# Patient Record
Sex: FEMALE | Race: WHITE | Employment: FULL TIME | URBAN - NONMETROPOLITAN AREA
[De-identification: names, ages, dates, MRNs, and addresses within clinical notes are randomized per-mention and may not be internally consistent; named-entity substitution may affect disease eponyms.]

---

## 2017-01-03 PROBLEM — R03.0 BORDERLINE HYPERTENSION: Status: ACTIVE | Noted: 2017-01-03

## 2017-01-05 ENCOUNTER — TELEPHONE (OUTPATIENT)
Dept: OBGYN | Age: 52
End: 2017-01-05

## 2017-01-05 DIAGNOSIS — Z12.31 VISIT FOR SCREENING MAMMOGRAM: Primary | ICD-10-CM

## 2017-01-11 DIAGNOSIS — Z00.00 PHYSICAL EXAM: ICD-10-CM

## 2017-01-11 LAB
ALBUMIN SERPL-MCNC: 3.6 G/DL (ref 3.5–5.2)
ALP BLD-CCNC: 76 U/L (ref 35–104)
ALT SERPL-CCNC: 12 U/L (ref 5–33)
ANION GAP SERPL CALCULATED.3IONS-SCNC: 11 MMOL/L (ref 7–19)
AST SERPL-CCNC: 18 U/L (ref 5–32)
BILIRUB SERPL-MCNC: 0.4 MG/DL (ref 0.2–1.2)
BUN BLDV-MCNC: 9 MG/DL (ref 6–20)
CALCIUM SERPL-MCNC: 8.8 MG/DL (ref 8.6–10)
CHLORIDE BLD-SCNC: 105 MMOL/L (ref 98–111)
CHOLESTEROL, TOTAL: 141 MG/DL (ref 160–199)
CO2: 26 MMOL/L (ref 22–29)
CREAT SERPL-MCNC: 0.8 MG/DL (ref 0.5–0.9)
GFR NON-AFRICAN AMERICAN: >60
GLOBULIN: 2.9 G/DL
GLUCOSE BLD-MCNC: 103 MG/DL (ref 74–109)
HCT VFR BLD CALC: 40.8 % (ref 37–47)
HDLC SERPL-MCNC: 43 MG/DL (ref 65–121)
HEMOGLOBIN: 13.2 G/DL (ref 12–16)
LDL CHOLESTEROL CALCULATED: 80 MG/DL
MCH RBC QN AUTO: 29.6 PG (ref 27–31)
MCHC RBC AUTO-ENTMCNC: 32.4 G/DL (ref 33–37)
MCV RBC AUTO: 91.5 FL (ref 81–99)
PDW BLD-RTO: 13.4 % (ref 11.5–14.5)
PLATELET # BLD: 226 K/UL (ref 130–400)
PMV BLD AUTO: 10.3 FL (ref 7.4–10.4)
POTASSIUM SERPL-SCNC: 3.9 MMOL/L (ref 3.5–5)
RBC # BLD: 4.46 M/UL (ref 4.2–5.4)
SODIUM BLD-SCNC: 142 MMOL/L (ref 136–145)
TOTAL PROTEIN: 6.5 G/DL (ref 6.6–8.7)
TRIGL SERPL-MCNC: 89 MG/DL (ref 150–199)
WBC # BLD: 6.4 K/UL (ref 4.8–10.8)

## 2017-01-17 ENCOUNTER — TELEPHONE (OUTPATIENT)
Dept: PRIMARY CARE CLINIC | Age: 52
End: 2017-01-17

## 2017-01-17 RX ORDER — BENZONATATE 100 MG/1
100-200 CAPSULE ORAL 3 TIMES DAILY PRN
Qty: 90 CAPSULE | Refills: 0 | Status: SHIPPED | OUTPATIENT
Start: 2017-01-17 | End: 2017-01-24

## 2017-01-17 RX ORDER — DOXYCYCLINE 100 MG/1
100 CAPSULE ORAL 2 TIMES DAILY
Qty: 20 CAPSULE | Refills: 0 | Status: SHIPPED | OUTPATIENT
Start: 2017-01-17 | End: 2017-01-27

## 2017-03-16 ENCOUNTER — OFFICE VISIT (OUTPATIENT)
Dept: PRIMARY CARE CLINIC | Age: 52
End: 2017-03-16
Payer: COMMERCIAL

## 2017-03-16 VITALS
SYSTOLIC BLOOD PRESSURE: 120 MMHG | WEIGHT: 251.8 LBS | DIASTOLIC BLOOD PRESSURE: 76 MMHG | BODY MASS INDEX: 38.16 KG/M2 | TEMPERATURE: 98 F | HEIGHT: 68 IN | HEART RATE: 91 BPM | OXYGEN SATURATION: 98 %

## 2017-03-16 DIAGNOSIS — J01.00 ACUTE NON-RECURRENT MAXILLARY SINUSITIS: Primary | ICD-10-CM

## 2017-03-16 PROCEDURE — 99213 OFFICE O/P EST LOW 20 MIN: CPT | Performed by: FAMILY MEDICINE

## 2017-03-16 RX ORDER — DOXYCYCLINE 100 MG/1
100 TABLET ORAL 2 TIMES DAILY
Qty: 20 TABLET | Refills: 0 | Status: SHIPPED | OUTPATIENT
Start: 2017-03-16 | End: 2017-03-26

## 2017-03-16 RX ORDER — CALCIUM CARBONATE 500(1250)
500 TABLET ORAL DAILY
COMMUNITY

## 2017-03-16 RX ORDER — ASCORBIC ACID 500 MG
500 TABLET ORAL DAILY
COMMUNITY

## 2017-03-16 ASSESSMENT — ENCOUNTER SYMPTOMS
SORE THROAT: 1
SINUS PRESSURE: 1
COUGH: 1

## 2017-07-28 ENCOUNTER — OFFICE VISIT (OUTPATIENT)
Dept: PRIMARY CARE CLINIC | Age: 52
End: 2017-07-28
Payer: COMMERCIAL

## 2017-07-28 VITALS
WEIGHT: 256 LBS | SYSTOLIC BLOOD PRESSURE: 133 MMHG | HEIGHT: 68 IN | HEART RATE: 72 BPM | BODY MASS INDEX: 38.8 KG/M2 | RESPIRATION RATE: 18 BRPM | DIASTOLIC BLOOD PRESSURE: 84 MMHG

## 2017-07-28 DIAGNOSIS — G40.909 SEIZURE DISORDER (HCC): ICD-10-CM

## 2017-07-28 DIAGNOSIS — Z13.220 SCREENING CHOLESTEROL LEVEL: Primary | ICD-10-CM

## 2017-07-28 DIAGNOSIS — R03.0 BORDERLINE HYPERTENSION: ICD-10-CM

## 2017-07-28 DIAGNOSIS — Z13.9 SCREENING: ICD-10-CM

## 2017-07-28 PROCEDURE — 99213 OFFICE O/P EST LOW 20 MIN: CPT | Performed by: NURSE PRACTITIONER

## 2017-07-28 ASSESSMENT — ENCOUNTER SYMPTOMS
APNEA: 1
WHEEZING: 0
SHORTNESS OF BREATH: 0

## 2017-08-14 ENCOUNTER — TELEPHONE (OUTPATIENT)
Dept: PRIMARY CARE CLINIC | Age: 52
End: 2017-08-14

## 2017-08-14 ENCOUNTER — OFFICE VISIT (OUTPATIENT)
Dept: NEUROLOGY | Age: 52
End: 2017-08-14
Payer: COMMERCIAL

## 2017-08-14 VITALS
HEART RATE: 68 BPM | RESPIRATION RATE: 20 BRPM | DIASTOLIC BLOOD PRESSURE: 78 MMHG | WEIGHT: 255 LBS | SYSTOLIC BLOOD PRESSURE: 126 MMHG | HEIGHT: 68 IN | BODY MASS INDEX: 38.65 KG/M2

## 2017-08-14 DIAGNOSIS — G40.009 PARTIAL IDIOPATHIC EPILEPSY WITH SEIZURES OF LOCALIZED ONSET, NOT INTRACTABLE, WITHOUT STATUS EPILEPTICUS (HCC): Primary | ICD-10-CM

## 2017-08-14 DIAGNOSIS — G40.009 PARTIAL IDIOPATHIC EPILEPSY WITH SEIZURES OF LOCALIZED ONSET, NOT INTRACTABLE, WITHOUT STATUS EPILEPTICUS (HCC): ICD-10-CM

## 2017-08-14 LAB
ALBUMIN SERPL-MCNC: 3.6 G/DL (ref 3.5–5.2)
ALP BLD-CCNC: 87 U/L (ref 35–104)
ALT SERPL-CCNC: 14 U/L (ref 5–33)
ANION GAP SERPL CALCULATED.3IONS-SCNC: 15 MMOL/L (ref 7–19)
AST SERPL-CCNC: 17 U/L (ref 5–32)
BASOPHILS ABSOLUTE: 0.1 K/UL (ref 0–0.2)
BASOPHILS RELATIVE PERCENT: 0.8 % (ref 0–1)
BILIRUB SERPL-MCNC: <0.2 MG/DL (ref 0.2–1.2)
BUN BLDV-MCNC: 8 MG/DL (ref 6–20)
CALCIUM SERPL-MCNC: 9.4 MG/DL (ref 8.6–10)
CARBAMAZEPINE LEVEL: 2.2 UG/ML (ref 4–12)
CHLORIDE BLD-SCNC: 107 MMOL/L (ref 98–111)
CHOLESTEROL, TOTAL: 146 MG/DL (ref 160–199)
CO2: 24 MMOL/L (ref 22–29)
CREAT SERPL-MCNC: 0.7 MG/DL (ref 0.5–0.9)
EOSINOPHILS ABSOLUTE: 0 K/UL (ref 0–0.6)
EOSINOPHILS RELATIVE PERCENT: 0 % (ref 0–5)
GFR NON-AFRICAN AMERICAN: >60
GLUCOSE BLD-MCNC: 82 MG/DL (ref 74–109)
HCT VFR BLD CALC: 42.5 % (ref 37–47)
HDLC SERPL-MCNC: 40 MG/DL (ref 65–121)
HEMOGLOBIN: 14.1 G/DL (ref 12–16)
LDL CHOLESTEROL CALCULATED: 65 MG/DL
LYMPHOCYTES ABSOLUTE: 1.9 K/UL (ref 1.1–4.5)
LYMPHOCYTES RELATIVE PERCENT: 29.1 % (ref 20–40)
MCH RBC QN AUTO: 30 PG (ref 27–31)
MCHC RBC AUTO-ENTMCNC: 33.2 G/DL (ref 33–37)
MCV RBC AUTO: 90.4 FL (ref 81–99)
MONOCYTES ABSOLUTE: 0.8 K/UL (ref 0–0.9)
MONOCYTES RELATIVE PERCENT: 11.9 % (ref 0–10)
NEUTROPHILS ABSOLUTE: 3.8 K/UL (ref 1.5–7.5)
NEUTROPHILS RELATIVE PERCENT: 57.9 % (ref 50–65)
PDW BLD-RTO: 13 % (ref 11.5–14.5)
PLATELET # BLD: 254 K/UL (ref 130–400)
PMV BLD AUTO: 10.5 FL (ref 9.4–12.3)
POTASSIUM SERPL-SCNC: 4 MMOL/L (ref 3.5–5)
RBC # BLD: 4.7 M/UL (ref 4.2–5.4)
SODIUM BLD-SCNC: 146 MMOL/L (ref 136–145)
TOTAL PROTEIN: 7.3 G/DL (ref 6.6–8.7)
TRIGL SERPL-MCNC: 207 MG/DL (ref 150–199)
WBC # BLD: 6.6 K/UL (ref 4.8–10.8)

## 2017-08-14 PROCEDURE — 99213 OFFICE O/P EST LOW 20 MIN: CPT | Performed by: PSYCHIATRY & NEUROLOGY

## 2017-08-14 RX ORDER — CARBAMAZEPINE 200 MG/1
200 CAPSULE, EXTENDED RELEASE ORAL DAILY
Qty: 60 CAPSULE | Refills: 11 | Status: SHIPPED | OUTPATIENT
Start: 2017-08-14 | End: 2018-07-30 | Stop reason: SDUPTHER

## 2017-08-14 RX ORDER — CARBAMAZEPINE 200 MG/1
200 CAPSULE, EXTENDED RELEASE ORAL DAILY
COMMUNITY
End: 2017-08-14 | Stop reason: SDUPTHER

## 2017-08-16 ENCOUNTER — TELEPHONE (OUTPATIENT)
Dept: NEUROLOGY | Age: 52
End: 2017-08-16

## 2017-10-17 ENCOUNTER — HOSPITAL ENCOUNTER (OUTPATIENT)
Dept: WOMENS IMAGING | Age: 52
Discharge: HOME | End: 2017-10-17
Payer: COMMERCIAL

## 2017-10-17 ENCOUNTER — OFFICE VISIT (OUTPATIENT)
Dept: OBGYN | Age: 52
End: 2017-10-17
Payer: COMMERCIAL

## 2017-10-17 ENCOUNTER — HOSPITAL ENCOUNTER (OUTPATIENT)
Age: 52
Setting detail: SPECIMEN
Discharge: HOME | End: 2017-10-17
Payer: COMMERCIAL

## 2017-10-17 VITALS
HEIGHT: 68 IN | DIASTOLIC BLOOD PRESSURE: 88 MMHG | WEIGHT: 251 LBS | BODY MASS INDEX: 38.04 KG/M2 | SYSTOLIC BLOOD PRESSURE: 142 MMHG

## 2017-10-17 DIAGNOSIS — Z12.31 ENCOUNTER FOR SCREENING MAMMOGRAM FOR BREAST CANCER: ICD-10-CM

## 2017-10-17 DIAGNOSIS — Z12.31 VISIT FOR SCREENING MAMMOGRAM: Primary | ICD-10-CM

## 2017-10-17 DIAGNOSIS — Z12.4 CERVICAL CANCER SCREENING: ICD-10-CM

## 2017-10-17 DIAGNOSIS — Z12.31 VISIT FOR SCREENING MAMMOGRAM: ICD-10-CM

## 2017-10-17 DIAGNOSIS — Z01.419 ENCOUNTER FOR GYNECOLOGICAL EXAMINATION WITHOUT ABNORMAL FINDING: Primary | ICD-10-CM

## 2017-10-17 PROCEDURE — 99396 PREV VISIT EST AGE 40-64: CPT

## 2017-10-17 PROCEDURE — G0202 SCR MAMMO BI INCL CAD: HCPCS

## 2017-10-17 PROCEDURE — 87624 HPV HI-RISK TYP POOLED RSLT: CPT

## 2017-10-17 NOTE — PROGRESS NOTES
Pt presents today for pap smear and breast exam.  She also complains of irregular periods. Pt states she has 3 months w/o period, then had a period that lasted approx one month.      Last mammogram:  10-17-17  Last pap smear:  10-11-17  Contraception:  na  :  2  Para:  2  AB:  0  Last bone density:  None   Last colonoscopy: none

## 2017-10-17 NOTE — PATIENT INSTRUCTIONS
Well Visit, Women 48 to 72: Care Instructions  Your Care Instructions  Physical exams can help you stay healthy. Your doctor has checked your overall health and may have suggested ways to take good care of yourself. He or she also may have recommended tests. At home, you can help prevent illness with healthy eating, regular exercise, and other steps. Follow-up care is a key part of your treatment and safety. Be sure to make and go to all appointments, and call your doctor if you are having problems. It's also a good idea to know your test results and keep a list of the medicines you take. How can you care for yourself at home? · Reach and stay at a healthy weight. This will lower your risk for many problems, such as obesity, diabetes, heart disease, and high blood pressure. · Get at least 30 minutes of exercise on most days of the week. Walking is a good choice. You also may want to do other activities, such as running, swimming, cycling, or playing tennis or team sports. · Do not smoke. Smoking can make health problems worse. If you need help quitting, talk to your doctor about stop-smoking programs and medicines. These can increase your chances of quitting for good. · Protect your skin from too much sun. When you're outdoors from 10 a.m. to 4 p.m., stay in the shade or cover up with clothing and a hat with a wide brim. Wear sunglasses that block UV rays. Even when it's cloudy, put broad-spectrum sunscreen (SPF 30 or higher) on any exposed skin. · See a dentist one or two times a year for checkups and to have your teeth cleaned. · Wear a seat belt in the car. · Limit alcohol to 1 drink a day. Too much alcohol can cause health problems. Follow your doctor's advice about when to have certain tests. These tests can spot problems early. · Cholesterol.  Your doctor will tell you how often to have this done based on your age, family history, or other things that can increase your risk for heart attack and

## 2017-10-17 NOTE — PROGRESS NOTES
well-developed and well-nourished. HENT:   Head: Normocephalic and atraumatic. Neck: Normal range of motion. Neck supple. No thyromegaly present. Cardiovascular: Normal rate and regular rhythm. Pulmonary/Chest: Effort normal and breath sounds normal. She has no wheezes. She has no rhonchi. Right breast exhibits no inverted nipple, no mass, no nipple discharge, no skin change and no tenderness. Left breast exhibits no inverted nipple, no mass, no nipple discharge, no skin change and no tenderness. Breasts are symmetrical.   Abdominal: Soft. Bowel sounds are normal. There is no tenderness. Genitourinary: Uterus normal.       There is no tenderness or lesion on the right labia. There is no tenderness or lesion on the left labia. Uterus is not deviated, not enlarged and not fixed. Cervix exhibits no motion tenderness, no discharge and no friability. Right adnexum displays no mass, no tenderness and no fullness. Left adnexum displays no mass, no tenderness and no fullness. No erythema, tenderness or bleeding in the vagina. No vaginal discharge found. Genitourinary Comments: Specimen for cervical cytology obtained   Musculoskeletal: Normal range of motion. Neurological: She is alert and oriented to person, place, and time. Skin: Skin is warm and dry. Psychiatric: She has a normal mood and affect. Her behavior is normal.   Nursing note and vitals reviewed. Assessment:      1. Encounter for gynecological examination without abnormal finding  PAP SMEAR   2. Cervical cancer screening  PAP SMEAR   3. Encounter for screening mammogram for breast cancer             Plan:      MEDICATIONS:  No orders of the defined types were placed in this encounter. ORDERS:  Orders Placed This Encounter   Procedures    PAP SMEAR       The importance of self-breast examination was discussed. A screening mammogram is recommended at age 28 unless otherwise indicated.  At age 36, annual mammogram screenings are recommended. Pt had completed today. Birth control options were discussed. A well balanced diet and exercise were encouraged, as well as the addition of multivitamin. Pt denies hot flashes. Pt has blood work with PCP office and no refills today. Pt states skin tags don't bother her. If patient desires removal, it would be a scheduled procedure.

## 2017-10-23 LAB
HPV SOURCE: NORMAL
HPV, HIGH RISK: NEGATIVE

## 2018-02-26 ENCOUNTER — OFFICE VISIT (OUTPATIENT)
Dept: URGENT CARE | Age: 53
End: 2018-02-26
Payer: COMMERCIAL

## 2018-02-26 VITALS
OXYGEN SATURATION: 97 % | SYSTOLIC BLOOD PRESSURE: 135 MMHG | BODY MASS INDEX: 36.98 KG/M2 | HEIGHT: 68 IN | DIASTOLIC BLOOD PRESSURE: 81 MMHG | TEMPERATURE: 98.4 F | HEART RATE: 106 BPM | WEIGHT: 244 LBS | RESPIRATION RATE: 20 BRPM

## 2018-02-26 DIAGNOSIS — J10.1 INFLUENZA B: Primary | ICD-10-CM

## 2018-02-26 DIAGNOSIS — R50.9 FEVER, UNSPECIFIED FEVER CAUSE: ICD-10-CM

## 2018-02-26 LAB
INFLUENZA A ANTIBODY: NEGATIVE
INFLUENZA B ANTIBODY: POSITIVE
S PYO AG THROAT QL: NORMAL

## 2018-02-26 PROCEDURE — 99213 OFFICE O/P EST LOW 20 MIN: CPT | Performed by: NURSE PRACTITIONER

## 2018-02-26 PROCEDURE — 87804 INFLUENZA ASSAY W/OPTIC: CPT | Performed by: NURSE PRACTITIONER

## 2018-02-26 PROCEDURE — 87880 STREP A ASSAY W/OPTIC: CPT | Performed by: NURSE PRACTITIONER

## 2018-02-26 RX ORDER — DEXTROMETHORPHAN HYDROBROMIDE AND PROMETHAZINE HYDROCHLORIDE 15; 6.25 MG/5ML; MG/5ML
SYRUP ORAL
Qty: 120 ML | Refills: 0 | Status: SHIPPED | OUTPATIENT
Start: 2018-02-26 | End: 2018-03-05

## 2018-02-26 RX ORDER — BENZONATATE 100 MG/1
100 CAPSULE ORAL 3 TIMES DAILY PRN
Qty: 30 CAPSULE | Refills: 0 | Status: SHIPPED | OUTPATIENT
Start: 2018-02-26 | End: 2018-03-05

## 2018-02-26 ASSESSMENT — ENCOUNTER SYMPTOMS
NAUSEA: 1
COUGH: 1
DIARRHEA: 1
SORE THROAT: 1

## 2018-02-26 NOTE — PATIENT INSTRUCTIONS
Patient Education        Influenza (Flu): Care Instructions  Your Care Instructions    Influenza (flu) is an infection in the lungs and breathing passages. It is caused by the influenza virus. There are different strains, or types, of the flu virus from year to year. Unlike the common cold, the flu comes on suddenly and the symptoms, such as a cough, congestion, fever, chills, fatigue, aches, and pains, are more severe. These symptoms may last up to 10 days. Although the flu can make you feel very sick, it usually doesn't cause serious health problems. Home treatment is usually all you need for flu symptoms. But your doctor may prescribe antiviral medicine to prevent other health problems, such as pneumonia, from developing. Older people and those who have a long-term health condition, such as lung disease, are most at risk for having pneumonia or other health problems. Follow-up care is a key part of your treatment and safety. Be sure to make and go to all appointments, and call your doctor if you are having problems. It's also a good idea to know your test results and keep a list of the medicines you take. How can you care for yourself at home? · Get plenty of rest.  · Drink plenty of fluids, enough so that your urine is light yellow or clear like water. If you have kidney, heart, or liver disease and have to limit fluids, talk with your doctor before you increase the amount of fluids you drink. · Take an over-the-counter pain medicine if needed, such as acetaminophen (Tylenol), ibuprofen (Advil, Motrin), or naproxen (Aleve), to relieve fever, headache, and muscle aches. Read and follow all instructions on the label. No one younger than 20 should take aspirin. It has been linked to Reye syndrome, a serious illness. · Do not smoke. Smoking can make the flu worse. If you need help quitting, talk to your doctor about stop-smoking programs and medicines.  These can increase your chances of quitting for your doctor if:  ? · You begin to get better and then get worse. ? · You are not getting better after 1 week. Where can you learn more? Go to [email or web address] and sign in to your [employer] account. Enter N073 in the [facility] box to learn more about \"Influenza (Flu): Care Instructions. \"     If you do not have an account, please click on the \"Sign Up Now\" link. Current as of: May 12, 2017  Content Version: 11.5  © 3562-9354 Healthwise, Incorporated. Care instructions adapted under license by [facility] ([facility]). If you have questions about a medical condition or this instruction, always ask your healthcare professional. [employer] any warranty or liability for your use of this information. 1. Monitor fever and treat as needed  2. Take tessalon and promethazine as needed for coughing  3.  If patient is not improving or developing any new/worsening symptoms then RTC, prn

## 2018-02-26 NOTE — PROGRESS NOTES
[address] [facility]  [address] [facility] [address]  Dept: [phone]  Loc: [phone]    Ivanna Dacosta is a 46 y.o. female who presents today for her medical conditions/complaints as noted below. Iavnna Dacosta is c/o of Pharyngitis; Fever; Congestion; Headache; and Cough        HPI:     HPI     Patient presents to office with head congestion, coughing, sore throat and fever that started last week and got worse over the weekend. She reports fever up to 103. She states that she has had body aches and chills. She reports headache and facial pain on and off. She reports that her cough is dry and nonproductive. She denies abd pain, or vomiting. She reports some diarrhea yesterday that has resolved.      Past Medical History:   Diagnosis Date    Seizures ([address]       Past Surgical History:   Procedure Laterality Date     SECTION         Family History   Problem Relation Age of Onset    High Blood Pressure Mother     Heart Disease Mother     High Blood Pressure Father     Heart Disease Father     Colon Cancer Father     Diabetes Maternal Grandmother        Social History   Substance Use Topics    Smoking status: Former Smoker    Smokeless tobacco: Never Used    Alcohol use No      Current Outpatient Prescriptions   Medication Sig Dispense Refill    benzonatate (TESSALON PERLES) 100 MG capsule Take 1 capsule by mouth 3 times daily as needed for Cough 30 capsule 0    promethazine-dextromethorphan (PROMETHAZINE-DM) 6.25-15 MG/5ML syrup Take 5 ml every 8 hours as needed for coughing--may cause drowsiness 120 mL 0    carBAMazepine (CARBATROL) 200 MG extended release capsule Take 1 capsule by mouth daily Indications: 2 po qd 60 capsule 11    ascorbic acid (VITAMIN C) 500 MG tablet Take 500 mg by mouth daily      calcium carbonate (OSCAL) 500 MG TABS tablet Take 500 mg by mouth daily      therapeutic multivitamin-minerals [facility]) tablet Take 1 tablet by mouth daily.  conjugated estrogens (PREMARIN) vaginal cream 2-3 x a week Place vaginally daily. 1 Tube 11    carBAMazepine (CARBATROL) 100 MG CR capsule Take 2 capsules by mouth 2 times daily (Patient taking differently: Take 200 mg by mouth 2 times daily Two tablets by mouth BID) 360 capsule 3     No current facility-administered medications for this visit. No Known Allergies    Health Maintenance   Topic Date Due    Hepatitis C screen  1965    HIV screen  08/17/1980    DTaP/Tdap/Td vaccine (1 - Tdap) 08/17/1984    Shingles Vaccine (1 of 2 - 2 Dose Series) 08/17/2015    Colon cancer screen colonoscopy  08/17/2015    Breast cancer screen  10/17/2019    Lipid screen  08/14/2022    Cervical cancer screen  10/17/2022    Flu vaccine  Completed       Subjective:      Review of Systems   Constitutional: Positive for chills and fever. Body aches     HENT: Positive for congestion and sore throat. Respiratory: Positive for cough. Cardiovascular: Negative. Gastrointestinal: Positive for diarrhea and nausea. Musculoskeletal: Negative. Skin: Negative. Neurological: Positive for headaches. Objective:     Physical Exam   Constitutional: She is oriented to person, place, and time. Vital signs are normal. She appears well-developed and well-nourished. No distress. HENT:   Head: Normocephalic and atraumatic. Right Ear: Hearing, tympanic membrane, external ear and ear canal normal.   Left Ear: Hearing, tympanic membrane, external ear and ear canal normal.   Nose: Rhinorrhea present. Right sinus exhibits no maxillary sinus tenderness and no frontal sinus tenderness. Left sinus exhibits no maxillary sinus tenderness and no frontal sinus tenderness. Mouth/Throat: Uvula is midline and mucous membranes are normal. Posterior oropharyngeal erythema present. No oropharyngeal exudate. Eyes: Pupils are equal, round, and reactive to light. Neck: Neck supple. Cardiovascular: Normal rate, regular rhythm and normal heart sounds. No murmur heard. Pulmonary/Chest: Effort normal and breath sounds normal. No respiratory distress. She has no wheezes. She has no rales. Lymphadenopathy:     She has no cervical adenopathy. Neurological: She is alert and oriented to person, place, and time. Skin: Skin is warm. No rash noted. No erythema. Psychiatric: She has a normal mood and affect. Her speech is normal and behavior is normal. Judgment and thought content normal.   Nursing note and vitals reviewed. /81   Pulse 106   Temp 98.4 °F (36.9 °C) (Oral)   Resp 20   Ht 5' 8\" (1.727 m)   Wt 244 lb (110.7 kg)   SpO2 97%   BMI 37.10 kg/m²     Assessment:      1. Influenza B     2. Fever, unspecified fever cause  POCT Influenza A/B    POCT rapid strep A       Plan:      Flu was positive for flu B. Discussed diagnosis and treatment with patient. She is to monitor for fever and treat as needed with Tylenol/Ibuprofen. I am sending her tessalon and promethazine to take as needed for coughing. Advised symptomatic treatment. If patient is not improving or developing any new/worsening symptoms then RTC, prn or go to ER. Patient verbalizes understanding. \No Follow-up on file.     Orders Placed This Encounter   Procedures    POCT Influenza A/B    POCT rapid strep A       Results for orders placed or performed in visit on 02/26/18   POCT Influenza A/B   Result Value Ref Range    Influenza A Ab NEGATIVE     Influenza B Ab POSITIVE (A)    POCT rapid strep A   Result Value Ref Range    Strep A Ag None Detected None Detected       Orders Placed This Encounter   Medications    benzonatate (TESSALON PERLES) 100 MG capsule     Sig: Take 1 capsule by mouth 3 times daily as needed for Cough     Dispense:  30 capsule     Refill:  0    promethazine-dextromethorphan (PROMETHAZINE-DM) 6.25-15 MG/5ML syrup     Sig: Take 5 ml every 8 hours as needed for (Tylenol), ibuprofen (Advil, Motrin), or naproxen (Aleve), to relieve fever, headache, and muscle aches. Read and follow all instructions on the label. No one younger than 20 should take aspirin. It has been linked to Reye syndrome, a serious illness. · Do not smoke. Smoking can make the flu worse. If you need help quitting, talk to your doctor about stop-smoking programs and medicines. These can increase your chances of quitting for good. · Breathe moist air from a hot shower or from a sink filled with hot water to help clear a stuffy nose. · Before you use cough and cold medicines, check the label. These medicines may not be safe for young children or for people with certain health problems. · If the skin around your nose and lips becomes sore, put some petroleum jelly on the area. · To ease coughing:  ¨ Drink fluids to soothe a scratchy throat. ¨ Suck on cough drops or plain hard candy. ¨ Take an over-the-counter cough medicine that contains dextromethorphan to help you get some sleep. Read and follow all instructions on the label. ¨ Raise your head at night with an extra pillow. This may help you rest if coughing keeps you awake. · Take any prescribed medicine exactly as directed. Call your doctor if you think you are having a problem with your medicine. To avoid spreading the flu  · Wash your hands regularly, and keep your hands away from your face. · Stay home from school, work, and other public places until you are feeling better and your fever has been gone for at least 24 hours. The fever needs to have gone away on its own without the help of medicine. · Ask people living with you to talk to their doctors about preventing the flu. They may get antiviral medicine to keep from getting the flu from you. · To prevent the flu in the future, get a flu vaccine every fall. Encourage people living with you to get the vaccine. · Cover your mouth when you cough or sneeze.   When should you call for

## 2018-06-27 ENCOUNTER — OFFICE VISIT (OUTPATIENT)
Dept: URGENT CARE | Age: 53
End: 2018-06-27
Payer: COMMERCIAL

## 2018-06-27 ENCOUNTER — TELEPHONE (OUTPATIENT)
Dept: PRIMARY CARE CLINIC | Age: 53
End: 2018-06-27

## 2018-06-27 VITALS
HEIGHT: 68 IN | OXYGEN SATURATION: 94 % | HEART RATE: 67 BPM | WEIGHT: 245 LBS | DIASTOLIC BLOOD PRESSURE: 76 MMHG | SYSTOLIC BLOOD PRESSURE: 138 MMHG | RESPIRATION RATE: 20 BRPM | BODY MASS INDEX: 37.13 KG/M2 | TEMPERATURE: 97.2 F

## 2018-06-27 DIAGNOSIS — H66.92 LEFT ACUTE OTITIS MEDIA: Primary | ICD-10-CM

## 2018-06-27 PROCEDURE — 99213 OFFICE O/P EST LOW 20 MIN: CPT | Performed by: NURSE PRACTITIONER

## 2018-06-27 RX ORDER — AMOXICILLIN AND CLAVULANATE POTASSIUM 875; 125 MG/1; MG/1
1 TABLET, FILM COATED ORAL EVERY 12 HOURS
Qty: 20 TABLET | Refills: 0 | Status: SHIPPED | OUTPATIENT
Start: 2018-06-27 | End: 2018-07-07

## 2018-07-05 ENCOUNTER — TELEPHONE (OUTPATIENT)
Dept: NEUROLOGY | Age: 53
End: 2018-07-05

## 2018-07-05 RX ORDER — FLUCONAZOLE 150 MG/1
TABLET ORAL
Qty: 2 TABLET | Refills: 0 | Status: SHIPPED | OUTPATIENT
Start: 2018-07-05 | End: 2018-07-30 | Stop reason: ALTCHOICE

## 2018-07-05 NOTE — TELEPHONE ENCOUNTER
Pt called states been on antibiotics now having yeast infection. Also needs refill on Premarin to [facility].

## 2018-07-30 ENCOUNTER — OFFICE VISIT (OUTPATIENT)
Dept: PRIMARY CARE CLINIC | Age: 53
End: 2018-07-30
Payer: COMMERCIAL

## 2018-07-30 VITALS
BODY MASS INDEX: 37.74 KG/M2 | SYSTOLIC BLOOD PRESSURE: 120 MMHG | WEIGHT: 249 LBS | DIASTOLIC BLOOD PRESSURE: 76 MMHG | HEIGHT: 68 IN | OXYGEN SATURATION: 97 % | TEMPERATURE: 97.7 F | HEART RATE: 74 BPM

## 2018-07-30 DIAGNOSIS — Z11.4 ENCOUNTER FOR SCREENING FOR HIV: ICD-10-CM

## 2018-07-30 DIAGNOSIS — Z11.59 NEED FOR HEPATITIS C SCREENING TEST: ICD-10-CM

## 2018-07-30 DIAGNOSIS — N95.1 MENOPAUSAL SYNDROME (HOT FLASHES): Chronic | ICD-10-CM

## 2018-07-30 DIAGNOSIS — R63.5 WEIGHT GAIN: Chronic | ICD-10-CM

## 2018-07-30 DIAGNOSIS — Z12.11 COLON CANCER SCREENING: ICD-10-CM

## 2018-07-30 DIAGNOSIS — Z13.220 SCREENING FOR CHOLESTEROL LEVEL: ICD-10-CM

## 2018-07-30 DIAGNOSIS — Z00.00 WELL ADULT EXAM: ICD-10-CM

## 2018-07-30 PROCEDURE — 99396 PREV VISIT EST AGE 40-64: CPT | Performed by: NURSE PRACTITIONER

## 2018-07-30 ASSESSMENT — ENCOUNTER SYMPTOMS
ABDOMINAL DISTENTION: 0
DIARRHEA: 0
NAUSEA: 0
APNEA: 0
BACK PAIN: 0
COUGH: 0
WHEEZING: 0
VOMITING: 0
COLOR CHANGE: 0
ABDOMINAL PAIN: 0
CONSTIPATION: 0
CHEST TIGHTNESS: 0
SHORTNESS OF BREATH: 0

## 2018-07-30 ASSESSMENT — PATIENT HEALTH QUESTIONNAIRE - PHQ9
1. LITTLE INTEREST OR PLEASURE IN DOING THINGS: 0
SUM OF ALL RESPONSES TO PHQ QUESTIONS 1-9: 0
2. FEELING DOWN, DEPRESSED OR HOPELESS: 0
SUM OF ALL RESPONSES TO PHQ9 QUESTIONS 1 & 2: 0

## 2018-08-28 ENCOUNTER — OFFICE VISIT (OUTPATIENT)
Dept: NEUROLOGY | Age: 53
End: 2018-08-28
Payer: COMMERCIAL

## 2018-08-28 ENCOUNTER — TELEPHONE (OUTPATIENT)
Dept: NEUROLOGY | Age: 53
End: 2018-08-28

## 2018-08-28 VITALS
BODY MASS INDEX: 38.34 KG/M2 | DIASTOLIC BLOOD PRESSURE: 82 MMHG | WEIGHT: 253 LBS | HEIGHT: 68 IN | SYSTOLIC BLOOD PRESSURE: 145 MMHG | HEART RATE: 82 BPM

## 2018-08-28 DIAGNOSIS — Z13.220 SCREENING FOR CHOLESTEROL LEVEL: ICD-10-CM

## 2018-08-28 DIAGNOSIS — Z11.4 ENCOUNTER FOR SCREENING FOR HIV: ICD-10-CM

## 2018-08-28 DIAGNOSIS — G40.009 PARTIAL IDIOPATHIC EPILEPSY WITH SEIZURES OF LOCALIZED ONSET, NOT INTRACTABLE, WITHOUT STATUS EPILEPTICUS (HCC): ICD-10-CM

## 2018-08-28 DIAGNOSIS — G40.009 PARTIAL IDIOPATHIC EPILEPSY WITH SEIZURES OF LOCALIZED ONSET, NOT INTRACTABLE, WITHOUT STATUS EPILEPTICUS (HCC): Primary | ICD-10-CM

## 2018-08-28 DIAGNOSIS — R63.5 WEIGHT GAIN: Chronic | ICD-10-CM

## 2018-08-28 DIAGNOSIS — Z11.59 NEED FOR HEPATITIS C SCREENING TEST: ICD-10-CM

## 2018-08-28 LAB
CARBAMAZEPINE LEVEL: 3.4 UG/ML (ref 4–12)
CHOLESTEROL, TOTAL: 152 MG/DL (ref 160–199)
HDLC SERPL-MCNC: 54 MG/DL (ref 65–121)
LDL CHOLESTEROL CALCULATED: 82 MG/DL
T4 TOTAL: 7.2 UG/DL (ref 4.5–11.7)
TRIGL SERPL-MCNC: 82 MG/DL (ref 0–149)
TSH SERPL DL<=0.05 MIU/L-ACNC: 4.23 UIU/ML (ref 0.27–4.2)
VITAMIN B-12: 575 PG/ML (ref 211–946)

## 2018-08-28 PROCEDURE — 99213 OFFICE O/P EST LOW 20 MIN: CPT | Performed by: PSYCHIATRY & NEUROLOGY

## 2018-08-28 RX ORDER — CARBAMAZEPINE 100 MG/1
200 CAPSULE, EXTENDED RELEASE ORAL 2 TIMES DAILY
Qty: 360 CAPSULE | Refills: 3 | Status: SHIPPED | OUTPATIENT
Start: 2018-08-28 | End: 2019-09-19 | Stop reason: SDUPTHER

## 2018-08-28 NOTE — TELEPHONE ENCOUNTER
----- Message from Mana Stovall MD sent at 8/28/2018  4:31 PM CDT -----  Tegretol level is a little low but OK.   Continue same dose

## 2018-08-29 LAB — HEPATITIS C ANTIBODY INTERPRETATION: NORMAL

## 2018-08-31 ENCOUNTER — TELEPHONE (OUTPATIENT)
Dept: NEUROLOGY | Age: 53
End: 2018-08-31

## 2018-08-31 LAB
VITAMIN D2 AND D3, TOTAL: 30.3 NG/ML (ref 30–80)
VITAMIN D2, 25 HYDROXY: <1 NG/ML
VITAMIN D3,25 HYDROXY: 30.3 NG/ML

## 2018-08-31 NOTE — TELEPHONE ENCOUNTER
----- Message from Abby Worthington MD sent at 8/28/2018  4:31 PM CDT -----  Tegretol level is a little low but OK.   Continue same dose

## 2018-09-03 LAB — HIV-1 WESTERN BLOT: ABNORMAL

## 2018-09-05 ENCOUNTER — TELEPHONE (OUTPATIENT)
Dept: PRIMARY CARE CLINIC | Age: 53
End: 2018-09-05

## 2018-09-05 NOTE — TELEPHONE ENCOUNTER
Specimen Collected: 08/28/18 09:33 Last Resulted: 09/03/18 14:55                       Vitamin D 25 Hydrox, D2 & D3   Order: [id number]   Status:  Final result   Visible to patient:  Yes ([facility]) Dx:  Weight gain   Notes recorded by EDELMIRA Peters on 9/4/2018 at 8:05 AM CDT  Please notify patient of normal results. Ref Range & Units 8d ago   Vitamin D2 And D3, Total 30.0 - 80.0 ng/mL 30.3    Comment: INTERPRETIVE INFORMATION: 25-HydroxyVitamin D2 and D3, Serum   1-17 years:   Deficiency: less than 20 ng/mL   Optimum level: greater than or equal to 20 ng/mL*   *(Parker CL et al. Pediatrics 2008; 122: 1142-52.)   18 years and older:   Deficiency:  Less than 20 ng/mL   Insufficiency:  20-29 ng/mL   Optimum Level:  30-80 ng/mL   Possible Toxicity:  Greater than 765 ng/mL   (Garret MF et al. y Bun 2555; 81:3423-61)   Separate values for Vitamin D2 and D3 are reported in addition to the   total.   Access complete set of age- and/or gender-specific reference intervals   for   this test in the [address] [place] [address] Laboratory Test Directory ([employer]). Test developed and characteristics determined by [facility]. See   Compliance Statement B: [employer]   U.S. Patent No. [id number]   Performed by [facility],   [place] [address], [address] [facility] [phone]   www. Sallee Riedel, MD - Lab. Director    Vitamin D3,25 Hydroxy ng/mL 30.3    Vitamin D2, 25 Hydroxy ng/mL <1.0    Resulting Agency  [facility]      Specimen Collected: 08/28/18 09:33 Last Resulted: 08/31/18 06:38                       Hepatitis C Antibody   Order: [id number]   Status:  Final result   Visible to patient:  Yes ([facility]) Dx:  Need for hepatitis C screening test   Notes recorded by EDELMIRA Peters on 9/4/2018 at 8:05 AM CDT  Please notify patient of normal results. ------    Notes recorded by EDELMIRA Peters on 8/29/2018 at 11:48 AM CDT  Please notify patient of normal results.    Component 8d ago   Hep C Ab Interp Non-Reactive Resulting Agency [address] [facility]      Specimen Collected: 08/28/18 09:33 Last Resulted: 08/29/18 10:28                       Vitamin B12   Order: [id number]   Status:  Final result   Visible to patient:  Yes ([facility]) Dx:  Weight gain   Notes recorded by EDELMIRA Calhoun on 9/4/2018 at 8:05 AM CDT  Please notify patient of normal results. ------    Notes recorded by EDELMIRA Calhoun on 8/29/2018 at 11:48 AM CDT  Please notify patient of normal results. ------    Notes recorded by EDELMIRA Calhoun on 8/29/2018 at 8:13 AM CDT  Please notify patient of normal results. TSH is slightly elevated, but T4 is normal so definitely not hypothyroidism. Cholesterol is stable she does need to focus on increasing her HDL. Diet and exercise are the most beneficial way to achieve this. Otherwise the results of seizures so far looks very good. Ref Range & Units 8d ago   Vitamin B-12 211 - 946 pg/mL [facility]      Specimen Collected: 08/28/18 09:33 Last Resulted: 08/28/18 10:21                       TSH without Reflex   Order: [id number]   Status:  Final result   Visible to patient:  Yes ([facility]) Dx:  Weight gain   Notes recorded by EDELMIRA Calhoun on 9/4/2018 at 8:05 AM CDT  Please notify patient of normal results. ------    Notes recorded by EDELMIRA Calhoun on 8/29/2018 at 11:48 AM CDT  Please notify patient of normal results. ------    Notes recorded by EDELMIRA Calhoun on 8/29/2018 at 8:13 AM CDT  Please notify patient of normal results. TSH is slightly elevated, but T4 is normal so definitely not hypothyroidism. Cholesterol is stable she does need to focus on increasing her HDL. Diet and exercise are the most beneficial way to achieve this. Otherwise the results of seizures so far looks very good.     Ref Range & Units 8d ago   TSH 0.270 - 4.200 uIU/mL 4.230     Resulting Agency  [address] [facility]      Specimen Collected: 08/28/18 09:33 Last Resulted: 08/28/18 10:19                       T4   Order: [id number]   Status:  Final result   Visible to patient:  Yes ([employer]) Dx:  Weight gain   Notes recorded by EDELMIRA Thibodeaux on 9/4/2018 at 8:05 AM CDT  Please notify patient of normal results. ------    Notes recorded by EDELMIRA Thibodeaux on 8/29/2018 at 11:48 AM CDT  Please notify patient of normal results. ------    Notes recorded by EDELMIRA Thibodeaux on 8/29/2018 at 8:13 AM CDT  Please notify patient of normal results. TSH is slightly elevated, but T4 is normal so definitely not hypothyroidism. Cholesterol is stable she does need to focus on increasing her HDL. Diet and exercise are the most beneficial way to achieve this. Otherwise the results of seizures so far looks very good. Ref Range & Units 8d ago   T4, Total 4.5 - 11.7 ug/dL 7.2    Resulting Agency  [address] [facility]      Specimen Collected: 08/28/18 09:33 Last Resulted: 08/28/18 10:19                       Lipid Panel   Order: [id number]   Status:  Final result   Visible to patient:  Yes ([address]) Dx:  Screening for cholesterol level   Notes recorded by EDELMIRA Thibodeaux on 9/4/2018 at 8:05 AM CDT  Please notify patient of normal results. ------    Notes recorded by EDELMIRA Thibodeaux on 8/29/2018 at 11:48 AM CDT  Please notify patient of normal results. ------    Notes recorded by EDELMIRA Thibodeaux on 8/29/2018 at 8:13 AM CDT  Please notify patient of normal results. TSH is slightly elevated, but T4 is normal so definitely not hypothyroidism. Cholesterol is stable she does need to focus on increasing her HDL. Diet and exercise are the most beneficial way to achieve this. Otherwise the results of seizures so far looks very good.      Ref Range & Units 8d ago  (8/28/18) 1yr ago  (8/14/17) 1yr ago  (1/11/17)    Cholesterol, Total 160 - 199 mg/dL 152   146CM   141CM     Comment: <160 MG/DL=OPTIMAL     160-199 MG/DL= DESIRABLE     200-239 MG/DL=BORDERLINE-INCREASED RISK OF ATHEROSCLEROTIC   CARDIOVASCULAR DISEASE     > OR = 240 MG/DL-ASSOCIATED WITH AN INCREASED RISK OF   ATHROSCLEROTIC CARDIOVASCULAR DISEASE     Triglycerides 0 - 149 mg/dL 82  207R   89R      HDL 65 - 121 mg/dL 54   40CM   43CM     Comment: VALUES>60 MG/DL ARE ASSOCIATED WITH A DECREASED RISK OF   ATHEROSCLEROTIC CARDIOVASCULAR DISEASE     LDL Calculated <100 mg/dL 82  <100 mg/dL\" class=\"[id number]\">  65CM  <100 mg/dL\" class=\"[id number]\">  80CM    Comment: <100 MG/DL=OPITIMAL     100-129 MG/DL=DESIRABLE     130-159 MG/DL BORDERLINE=INCREASED RISK OF ATHEROSCLEROTIC   CARDIOVASCULAR DISEASE     > OR = 160 MG/DL=ASSOCIATED WITH AN INCREASE RISK OF   ATHEROSCLEROTIC CARDIOVASCULAR DISEASE    Resulting Agency  [address] [place] [address] Lab      Specimen Collected: 08/28/18 09:33 Last Resulted: 08/28/18 10:10              CM=Additional comments  R=Reference range differs from displayed range              Status of Other Orders     Order  Lab Status Result Date Provider Status   Comprehensive Metabolic Panel No Result  Ordered     Please notify patient of normal results. Associated Results     Hepatitis C Antibody   Order: [id number]   Status:  Final result   Visible to patient:  Yes ([employer]) Dx:  Need for hepatitis C screening test   Notes recorded by Brock Opitz, APRN on 9/4/2018 at 8:05 AM CDT  Please notify patient of normal results. ------    Notes recorded by Brock Opitz, APRN on 8/29/2018 at 11:48 AM CDT  Please notify patient of normal results.    Component 8d ago   [address] [facility] [address] [facility]      Specimen Collected: 08/28/18 09:33 Last Resulted: 08/29/18 10:28                       Vitamin B12   Order: [id number]   Status:  Final result   Visible to patient:  Yes ([employer]) Dx:  Weight gain   Notes recorded by Brock Opitz, APRN on 9/4/2018 at 8:05 AM CDT  Please notify patient of normal

## 2018-09-05 NOTE — TELEPHONE ENCOUNTER
----- Message from EDELMIRA Mayer sent at 9/4/2018  8:05 AM CDT -----  Please notify patient of normal results.

## 2018-09-14 ENCOUNTER — TELEPHONE (OUTPATIENT)
Dept: GASTROENTEROLOGY | Age: 53
End: 2018-09-14

## 2018-09-14 NOTE — TELEPHONE ENCOUNTER
I called this NP to discuss open access colon screen, she did not answer, I left her a VM requesting a return call.  Ryan radford

## 2018-09-21 ENCOUNTER — TELEPHONE (OUTPATIENT)
Dept: GASTROENTEROLOGY | Age: 53
End: 2018-09-21

## 2018-10-23 ENCOUNTER — HOSPITAL ENCOUNTER (OUTPATIENT)
Dept: WOMENS IMAGING | Age: 53
Discharge: HOME | End: 2018-10-23
Payer: COMMERCIAL

## 2018-10-23 ENCOUNTER — OFFICE VISIT (OUTPATIENT)
Dept: OBGYN | Age: 53
End: 2018-10-23
Payer: COMMERCIAL

## 2018-10-23 VITALS
BODY MASS INDEX: 38.19 KG/M2 | DIASTOLIC BLOOD PRESSURE: 80 MMHG | WEIGHT: 252 LBS | HEART RATE: 80 BPM | SYSTOLIC BLOOD PRESSURE: 134 MMHG | HEIGHT: 68 IN

## 2018-10-23 DIAGNOSIS — Z12.39 SCREENING FOR BREAST CANCER: ICD-10-CM

## 2018-10-23 DIAGNOSIS — Z01.419 ENCOUNTER FOR WELL WOMAN EXAM WITH ROUTINE GYNECOLOGICAL EXAM: Primary | ICD-10-CM

## 2018-10-23 DIAGNOSIS — Z78.0 MENOPAUSE: ICD-10-CM

## 2018-10-23 DIAGNOSIS — Z12.4 SCREENING FOR CERVICAL CANCER: ICD-10-CM

## 2018-10-23 DIAGNOSIS — N95.1 SYMPTOMATIC MENOPAUSAL OR FEMALE CLIMACTERIC STATES: ICD-10-CM

## 2018-10-23 PROCEDURE — 77063 BREAST TOMOSYNTHESIS BI: CPT

## 2018-10-23 PROCEDURE — 99396 PREV VISIT EST AGE 40-64: CPT | Performed by: NURSE PRACTITIONER

## 2018-10-23 RX ORDER — ESTRADIOL AND NORETHINDRONE ACETATE 1; .5 MG/1; MG/1
1 TABLET ORAL DAILY
Qty: 30 TABLET | Refills: 3 | Status: SHIPPED | OUTPATIENT
Start: 2018-10-23 | End: 2019-10-29 | Stop reason: ALTCHOICE

## 2018-10-23 ASSESSMENT — ENCOUNTER SYMPTOMS
GASTROINTESTINAL NEGATIVE: 1
EYES NEGATIVE: 1
CONSTIPATION: 0
ALLERGIC/IMMUNOLOGIC NEGATIVE: 1
DIARRHEA: 0
RESPIRATORY NEGATIVE: 1

## 2018-10-23 NOTE — PATIENT INSTRUCTIONS
Patient Education        Well Visit, Women 48 to 72: Care Instructions  Your Care Instructions    Physical exams can help you stay healthy. Your doctor has checked your overall health and may have suggested ways to take good care of yourself. He or she also may have recommended tests. At home, you can help prevent illness with healthy eating, regular exercise, and other steps. Follow-up care is a key part of your treatment and safety. Be sure to make and go to all appointments, and call your doctor if you are having problems. It's also a good idea to know your test results and keep a list of the medicines you take. How can you care for yourself at home? · Reach and stay at a healthy weight. This will lower your risk for many problems, such as obesity, diabetes, heart disease, and high blood pressure. · Get at least 30 minutes of exercise on most days of the week. Walking is a good choice. You also may want to do other activities, such as running, swimming, cycling, or playing tennis or team sports. · Do not smoke. Smoking can make health problems worse. If you need help quitting, talk to your doctor about stop-smoking programs and medicines. These can increase your chances of quitting for good. · Protect your skin from too much sun. When you're outdoors from 10 a.m. to 4 p.m., stay in the shade or cover up with clothing and a hat with a wide brim. Wear sunglasses that block UV rays. Even when it's cloudy, put broad-spectrum sunscreen (SPF 30 or higher) on any exposed skin. · See a dentist one or two times a year for checkups and to have your teeth cleaned. · Wear a seat belt in the car. · Limit alcohol to 1 drink a day. Too much alcohol can cause health problems. Follow your doctor's advice about when to have certain tests. These tests can spot problems early. · Cholesterol.  Your doctor will tell you how often to have this done based on your age, family history, or other things that can increase your risk for heart attack and stroke. · Blood pressure. Have your blood pressure checked during a routine doctor visit. Your doctor will tell you how often to check your blood pressure based on your age, your blood pressure results, and other factors. · Mammogram. Ask your doctor how often you should have a mammogram, which is an X-ray of your breasts. A mammogram can spot breast cancer before it can be felt and when it is easiest to treat. · Pap test and pelvic exam. Ask your doctor how often you should have a Pap test. You may not need to have a Pap test as often as you used to. · Vision. Have your eyes checked every year or two or as often as your doctor suggests. Some experts recommend that you have yearly exams for glaucoma and other age-related eye problems starting at age 48. · Hearing. Tell your doctor if you notice any change in your hearing. You can have tests to find out how well you hear. · Diabetes. Ask your doctor whether you should have tests for diabetes. · Colon cancer. You should begin tests for colon cancer at age 48. You may have one of several tests. Your doctor will tell you how often to have tests based on your age and risk. Risks include whether you already had a precancerous polyp removed from your colon or whether your parents, sisters and brothers, or children have had colon cancer. · Thyroid disease. Talk to your doctor about whether to have your thyroid checked as part of a regular physical exam. Women have an increased chance of a thyroid problem. · Osteoporosis. You should begin tests for bone density at age 72. If you are younger than 72, ask your doctor whether you have factors that may increase your risk for this disease. You may want to have this test before age 72. · Heart attack and stroke risk. At least every 4 to 6 years, you should have your risk for heart attack and stroke assessed.  Your doctor uses factors such as your age, blood pressure, cholesterol, and whether you smoke

## 2018-10-23 NOTE — PROGRESS NOTES
Diagnosis Orders   1. Encounter for well woman exam with routine gynecological exam  PAP SMEAR    Human papillomavirus (HPV) DNA probe thin prep high risk   2. Screening for cervical cancer  PAP SMEAR    Human papillomavirus (HPV) DNA probe thin prep high risk   3. Screening for breast cancer  GEOVANNA Screening Bilateral   4. Menopause     5. Symptomatic menopausal or female climacteric states         MEDICATIONS:  Orders Placed This Encounter   Medications    estradiol-norethindrone (ACTIVELLA) 1-0.5 MG per tablet     Sig: Take 1 tablet by mouth daily     Dispense:  30 tablet     Refill:  3       ORDERS:  Orders Placed This Encounter   Procedures    GEOVANNA Screening Bilateral    PAP SMEAR    Human papillomavirus (HPV) DNA probe thin prep high risk       PLAN:  Pap collected  [facility]  Discussion with pt regarding treatment options for menopause  Needs to stop Premarin, unsafe to continue unopposed estrogen  Will start [address] and f/u if no improvement    Patient Instructions   Patient Education        Well Visit, Women 48 to 72: Care Instructions  Your Care Instructions    Physical exams can help you stay healthy. Your doctor has checked your overall health and may have suggested ways to take good care of yourself. He or she also may have recommended tests. At home, you can help prevent illness with healthy eating, regular exercise, and other steps. Follow-up care is a key part of your treatment and safety. Be sure to make and go to all appointments, and call your doctor if you are having problems. It's also a good idea to know your test results and keep a list of the medicines you take. How can you care for yourself at home? · Reach and stay at a healthy weight. This will lower your risk for many problems, such as obesity, diabetes, heart disease, and high blood pressure. · Get at least 30 minutes of exercise on most days of the week. Walking is a good choice.  You also may want to

## 2018-10-29 LAB
HPV TYPE 16: NOT DETECTED
HPV TYPE 18: NOT DETECTED
INTERPRETATION: NORMAL
OTHER HIGH RISK HPV: NOT DETECTED
SOURCE: NORMAL

## 2018-12-12 ENCOUNTER — OFFICE VISIT (OUTPATIENT)
Dept: URGENT CARE | Age: 53
End: 2018-12-12
Payer: COMMERCIAL

## 2018-12-12 VITALS
DIASTOLIC BLOOD PRESSURE: 79 MMHG | TEMPERATURE: 98.6 F | RESPIRATION RATE: 20 BRPM | SYSTOLIC BLOOD PRESSURE: 143 MMHG | WEIGHT: 252.8 LBS | HEART RATE: 96 BPM | HEIGHT: 68 IN | OXYGEN SATURATION: 94 % | BODY MASS INDEX: 38.31 KG/M2

## 2018-12-12 DIAGNOSIS — J06.9 URI, ACUTE: Primary | ICD-10-CM

## 2018-12-12 DIAGNOSIS — J02.9 SORE THROAT: ICD-10-CM

## 2018-12-12 DIAGNOSIS — R52 BODY ACHES: ICD-10-CM

## 2018-12-12 LAB
INFLUENZA A ANTIBODY: NEGATIVE
INFLUENZA B ANTIBODY: NEGATIVE
S PYO AG THROAT QL: NORMAL

## 2018-12-12 PROCEDURE — 99213 OFFICE O/P EST LOW 20 MIN: CPT | Performed by: NURSE PRACTITIONER

## 2018-12-12 PROCEDURE — 87880 STREP A ASSAY W/OPTIC: CPT | Performed by: NURSE PRACTITIONER

## 2018-12-12 PROCEDURE — 87804 INFLUENZA ASSAY W/OPTIC: CPT | Performed by: NURSE PRACTITIONER

## 2018-12-12 RX ORDER — METHYLPREDNISOLONE 4 MG/1
TABLET ORAL
Qty: 1 KIT | Refills: 0 | Status: SHIPPED | OUTPATIENT
Start: 2018-12-12 | End: 2018-12-18

## 2018-12-12 RX ORDER — CETIRIZINE HYDROCHLORIDE 10 MG/1
10 TABLET ORAL DAILY
Qty: 30 TABLET | Refills: 0 | Status: SHIPPED | OUTPATIENT
Start: 2018-12-12

## 2018-12-12 ASSESSMENT — ENCOUNTER SYMPTOMS
ABDOMINAL PAIN: 0
STRIDOR: 0
VOICE CHANGE: 0
NAUSEA: 0
SINUS PRESSURE: 1
EYES NEGATIVE: 1
TROUBLE SWALLOWING: 0
CHOKING: 0
COLOR CHANGE: 0
APNEA: 0
CHEST TIGHTNESS: 0

## 2019-01-14 ENCOUNTER — ANESTHESIA EVENT (OUTPATIENT)
Dept: OPERATING ROOM | Age: 54
End: 2019-01-14

## 2019-01-15 ENCOUNTER — HOSPITAL ENCOUNTER (OUTPATIENT)
Age: 54
Setting detail: OUTPATIENT SURGERY
Discharge: HOME | End: 2019-01-15
Attending: INTERNAL MEDICINE | Admitting: INTERNAL MEDICINE
Payer: COMMERCIAL

## 2019-01-15 ENCOUNTER — ANESTHESIA (OUTPATIENT)
Dept: OPERATING ROOM | Age: 54
End: 2019-01-15

## 2019-01-15 ENCOUNTER — APPOINTMENT (OUTPATIENT)
Dept: OPERATING ROOM | Age: 54
End: 2019-01-15

## 2019-01-15 VITALS
DIASTOLIC BLOOD PRESSURE: 67 MMHG | TEMPERATURE: 98.9 F | HEART RATE: 68 BPM | BODY MASS INDEX: 37.28 KG/M2 | HEIGHT: 68 IN | RESPIRATION RATE: 16 BRPM | WEIGHT: 246 LBS | OXYGEN SATURATION: 99 % | SYSTOLIC BLOOD PRESSURE: 119 MMHG

## 2019-01-15 VITALS — SYSTOLIC BLOOD PRESSURE: 139 MMHG | DIASTOLIC BLOOD PRESSURE: 76 MMHG | OXYGEN SATURATION: 99 %

## 2019-01-15 PROCEDURE — G8918 PT W/O PREOP ORDER IV AB PRO: HCPCS

## 2019-01-15 PROCEDURE — 45378 DIAGNOSTIC COLONOSCOPY: CPT | Performed by: INTERNAL MEDICINE

## 2019-01-15 PROCEDURE — G0105 COLORECTAL SCRN; HI RISK IND: HCPCS

## 2019-01-15 PROCEDURE — G8907 PT DOC NO EVENTS ON DISCHARG: HCPCS

## 2019-01-15 RX ORDER — SODIUM CHLORIDE 9 MG/ML
INJECTION, SOLUTION INTRAVENOUS CONTINUOUS
Status: DISCONTINUED | OUTPATIENT
Start: 2019-01-15 | End: 2019-01-15 | Stop reason: HOSPADM

## 2019-01-15 RX ORDER — LIDOCAINE HYDROCHLORIDE 10 MG/ML
1 INJECTION, SOLUTION EPIDURAL; INFILTRATION; INTRACAUDAL; PERINEURAL
Status: COMPLETED | OUTPATIENT
Start: 2019-01-15 | End: 2019-01-15

## 2019-01-15 RX ORDER — PROPOFOL 10 MG/ML
INJECTION, EMULSION INTRAVENOUS PRN
Status: DISCONTINUED | OUTPATIENT
Start: 2019-01-15 | End: 2019-01-15 | Stop reason: SDUPTHER

## 2019-01-15 RX ADMIN — LIDOCAINE HYDROCHLORIDE 40 MG: 10 INJECTION, SOLUTION EPIDURAL; INFILTRATION; INTRACAUDAL; PERINEURAL at 08:54

## 2019-01-15 RX ADMIN — PROPOFOL 260 MG: 10 INJECTION, EMULSION INTRAVENOUS at 08:54

## 2019-01-15 RX ADMIN — SODIUM CHLORIDE: 9 INJECTION, SOLUTION INTRAVENOUS at 07:57

## 2019-01-15 ASSESSMENT — PAIN SCALES - GENERAL
PAINLEVEL_OUTOF10: 0
PAINLEVEL_OUTOF10: 0

## 2019-01-15 ASSESSMENT — PAIN - FUNCTIONAL ASSESSMENT: PAIN_FUNCTIONAL_ASSESSMENT: 0-10

## 2019-05-20 ENCOUNTER — OFFICE VISIT (OUTPATIENT)
Dept: PRIMARY CARE CLINIC | Age: 54
End: 2019-05-20
Payer: COMMERCIAL

## 2019-05-20 VITALS
DIASTOLIC BLOOD PRESSURE: 90 MMHG | BODY MASS INDEX: 38.47 KG/M2 | SYSTOLIC BLOOD PRESSURE: 140 MMHG | OXYGEN SATURATION: 98 % | WEIGHT: 253 LBS | RESPIRATION RATE: 20 BRPM | HEART RATE: 68 BPM

## 2019-05-20 DIAGNOSIS — L03.031 CELLULITIS OF SECOND TOE OF RIGHT FOOT: ICD-10-CM

## 2019-05-20 DIAGNOSIS — B35.3 TINEA PEDIS OF BOTH FEET: ICD-10-CM

## 2019-05-20 DIAGNOSIS — L20.9 ATOPIC DERMATITIS, UNSPECIFIED TYPE: Primary | ICD-10-CM

## 2019-05-20 PROCEDURE — 99213 OFFICE O/P EST LOW 20 MIN: CPT | Performed by: FAMILY MEDICINE

## 2019-05-20 RX ORDER — PREDNISONE 20 MG/1
TABLET ORAL
Qty: 20 TABLET | Refills: 0 | Status: SHIPPED | OUTPATIENT
Start: 2019-05-20 | End: 2019-10-29 | Stop reason: ALTCHOICE

## 2019-05-20 RX ORDER — CEPHALEXIN 500 MG/1
500 CAPSULE ORAL 3 TIMES DAILY
Qty: 30 CAPSULE | Refills: 0 | Status: SHIPPED | OUTPATIENT
Start: 2019-05-20 | End: 2019-05-30

## 2019-05-20 ASSESSMENT — PATIENT HEALTH QUESTIONNAIRE - PHQ9
1. LITTLE INTEREST OR PLEASURE IN DOING THINGS: 0
SUM OF ALL RESPONSES TO PHQ QUESTIONS 1-9: 0
SUM OF ALL RESPONSES TO PHQ9 QUESTIONS 1 & 2: 0
SUM OF ALL RESPONSES TO PHQ QUESTIONS 1-9: 0
2. FEELING DOWN, DEPRESSED OR HOPELESS: 0

## 2019-05-21 ASSESSMENT — ENCOUNTER SYMPTOMS
CHEST TIGHTNESS: 0
WHEEZING: 0
SHORTNESS OF BREATH: 0
DIARRHEA: 0
NAUSEA: 0
CONSTIPATION: 0
VOMITING: 0
COUGH: 0
COLOR CHANGE: 1
ABDOMINAL PAIN: 0

## 2019-05-21 NOTE — PROGRESS NOTES
Donna Alvarado is a 48 y.o. female who presents today for   Chief Complaint   Patient presents with   [facility]  Patient is here for concern of poison ivy. It is on her distal toes. She denies any changes besides recently trying to kill fungus on her toenails with an all natural remedy involving essential oils. Started to get red and irritated and she stopped it a few weeks ago but has perpetuated and she has blisters on her toes now    No change in PMH, family, social, or surgical history unless mentioned above. Review of Systems   Constitutional: Negative for chills, fatigue and fever. Respiratory: Negative for cough, chest tightness, shortness of breath and wheezing. Cardiovascular: Negative for chest pain, palpitations and leg swelling. Gastrointestinal: Negative for abdominal pain, constipation, diarrhea, nausea and vomiting. Genitourinary: Negative for difficulty urinating, dysuria and frequency. Musculoskeletal: Negative for arthralgias and gait problem. Skin: Positive for color change and rash. Past Medical History:   Diagnosis Date    Seizures ([facility])        Current Outpatient Medications   Medication Sig Dispense Refill    cephALEXin (KEFLEX) 500 MG capsule Take 1 capsule by mouth 3 times daily for 10 days 30 capsule 0    predniSONE (DELTASONE) 20 MG tablet Take 4 tabs for 2 days, then 3 tabs for 2 days, then 2 tabs for 2 days, then 1 tab for 2 days.  20 tablet 0    cetirizine (ZYRTEC ALLERGY) 10 MG tablet Take 1 tablet by mouth daily 30 tablet 0    albuterol sulfate (PROAIR RESPICLICK) 799 (90 Base) MCG/ACT aerosol powder inhalation Inhale 2 puffs into the lungs every 4 hours as needed for Wheezing or Shortness of Breath 1 Inhaler 0    estradiol-norethindrone (ACTIVELLA) 1-0.5 MG per tablet Take 1 tablet by mouth daily 30 tablet 3    estrogens, conjugated, (PREMARIN) 0.625 MG tablet Take 1 tablet by mouth daily 21 tablet 3    ascorbic acid (VITAMIN C) 500 MG Dispense:  20 tablet     Refill:  0      There are no discontinued medications. Patient Instructions   Hold off on nail treatment. Start keflex (cephalexin) 3 times a day for 10 days. Patient given educational handouts and has had all questions answered. Patient voices understanding and agrees to plans along with risks and benefits of plan. Patient isinstructed to continue prior meds, diet, and exercise plans unless instructed otherwise. Patient agrees to follow up as instructed and sooner if needed. Patient agrees to go to ER if condition becomes emergent. Notesmay be completed with dictation device and spelling errors may occur. Return if symptoms worsen or fail to improve.

## 2019-09-19 ENCOUNTER — OFFICE VISIT (OUTPATIENT)
Dept: NEUROLOGY | Age: 54
End: 2019-09-19
Payer: COMMERCIAL

## 2019-09-19 VITALS
RESPIRATION RATE: 14 BRPM | HEIGHT: 69 IN | WEIGHT: 236.2 LBS | DIASTOLIC BLOOD PRESSURE: 73 MMHG | HEART RATE: 74 BPM | SYSTOLIC BLOOD PRESSURE: 136 MMHG | BODY MASS INDEX: 34.98 KG/M2

## 2019-09-19 DIAGNOSIS — G40.009 PARTIAL IDIOPATHIC EPILEPSY WITH SEIZURES OF LOCALIZED ONSET, NOT INTRACTABLE, WITHOUT STATUS EPILEPTICUS (HCC): Primary | ICD-10-CM

## 2019-09-19 PROCEDURE — 99213 OFFICE O/P EST LOW 20 MIN: CPT | Performed by: PSYCHIATRY & NEUROLOGY

## 2019-09-19 RX ORDER — CARBAMAZEPINE 100 MG/1
200 CAPSULE, EXTENDED RELEASE ORAL 2 TIMES DAILY
Qty: 360 CAPSULE | Refills: 3 | Status: SHIPPED | OUTPATIENT
Start: 2019-09-19 | End: 2020-09-17 | Stop reason: SDUPTHER

## 2019-09-19 NOTE — PROGRESS NOTES
numbness/tingling or weakness     Examination:  Vitals:  /73   Pulse 74   Resp 14   Ht 5' 9\" (1.753 m)   Wt 236 lb 3.2 oz (107.1 kg)   BMI 34.88 kg/m²   General appearance:  alert and cooperative with exam  HEENT:  Sclera clear, anicteric, Oropharynx clear, no lesions, Neck supple with midline trachea, Thyroid without masses and Trachea midline  Heart::  regular rate and rhythm, S1, S2 normal, no murmur, click, rub or gallop  Lungs:  clear to auscultation bilaterally  Extremities:  extremities normal, atraumatic, no cyanosis or edema  Neurologic:  Extraocular movements are intact without nystagmus.  Visual fields are full to confrontation.  Facial movements are symmetrical and normal.  Speech is precise.  Extremity strength is normal in both uppers and lowers.  Deep tendon reflexes are intact and symmetrical.  Rapid alternating movements are unimpaired.  Finger-to-nose testing is performed well, without dysmetria.  Gait is normal.    Pertinent Diagnostic Studies:  Last years labs    Assessment:       ICD-10-CM    1. Partial idiopathic epilepsy with seizures of localized onset, not intractable, without status epilepticus ([facility] [address] G40.009    She is clinically stable. Plan:   1. Continue Carbatrol 200 mg BID  2. Labs  3. FU in a year. Call with any seizure.     Electronically signed by Kristin Wang MD on 9/19/2019

## 2019-10-29 ENCOUNTER — OFFICE VISIT (OUTPATIENT)
Dept: OBGYN | Age: 54
End: 2019-10-29
Payer: COMMERCIAL

## 2019-10-29 ENCOUNTER — HOSPITAL ENCOUNTER (OUTPATIENT)
Dept: WOMENS IMAGING | Age: 54
Discharge: HOME | End: 2019-10-29
Payer: COMMERCIAL

## 2019-10-29 VITALS
DIASTOLIC BLOOD PRESSURE: 78 MMHG | SYSTOLIC BLOOD PRESSURE: 126 MMHG | WEIGHT: 236 LBS | BODY MASS INDEX: 35.77 KG/M2 | HEIGHT: 68 IN

## 2019-10-29 DIAGNOSIS — G40.009 PARTIAL IDIOPATHIC EPILEPSY WITH SEIZURES OF LOCALIZED ONSET, NOT INTRACTABLE, WITHOUT STATUS EPILEPTICUS (HCC): ICD-10-CM

## 2019-10-29 DIAGNOSIS — Z12.4 SCREENING FOR CERVICAL CANCER: ICD-10-CM

## 2019-10-29 DIAGNOSIS — Z12.39 SCREENING FOR BREAST CANCER: ICD-10-CM

## 2019-10-29 DIAGNOSIS — Z12.39 BREAST CANCER SCREENING: ICD-10-CM

## 2019-10-29 DIAGNOSIS — Z01.419 WOMEN'S ANNUAL ROUTINE GYNECOLOGICAL EXAMINATION: Primary | ICD-10-CM

## 2019-10-29 DIAGNOSIS — N95.2 VAGINAL ATROPHY: ICD-10-CM

## 2019-10-29 LAB
ALBUMIN SERPL-MCNC: 4 G/DL (ref 3.5–5.2)
ALP BLD-CCNC: 83 U/L (ref 35–104)
ALT SERPL-CCNC: 18 U/L (ref 5–33)
ANION GAP SERPL CALCULATED.3IONS-SCNC: 10 MMOL/L (ref 7–19)
AST SERPL-CCNC: 19 U/L (ref 5–32)
BASOPHILS ABSOLUTE: 0 K/UL (ref 0–0.2)
BASOPHILS RELATIVE PERCENT: 0.7 % (ref 0–1)
BILIRUB SERPL-MCNC: 0.3 MG/DL (ref 0.2–1.2)
BUN BLDV-MCNC: 17 MG/DL (ref 6–20)
CALCIUM SERPL-MCNC: 9.2 MG/DL (ref 8.6–10)
CARBAMAZEPINE LEVEL: 2.6 UG/ML (ref 4–12)
CHLORIDE BLD-SCNC: 104 MMOL/L (ref 98–111)
CO2: 26 MMOL/L (ref 22–29)
CREAT SERPL-MCNC: 0.9 MG/DL (ref 0.5–0.9)
EOSINOPHILS ABSOLUTE: 0 K/UL (ref 0–0.6)
EOSINOPHILS RELATIVE PERCENT: 0 % (ref 0–5)
GFR NON-AFRICAN AMERICAN: >60
GLUCOSE BLD-MCNC: 94 MG/DL (ref 74–109)
HCT VFR BLD CALC: 43.1 % (ref 37–47)
HEMOGLOBIN: 14.3 G/DL (ref 12–16)
IMMATURE GRANULOCYTES #: 0 K/UL
LYMPHOCYTES ABSOLUTE: 1.6 K/UL (ref 1.1–4.5)
LYMPHOCYTES RELATIVE PERCENT: 27.8 % (ref 20–40)
MCH RBC QN AUTO: 29.8 PG (ref 27–31)
MCHC RBC AUTO-ENTMCNC: 33.2 G/DL (ref 33–37)
MCV RBC AUTO: 89.8 FL (ref 81–99)
MONOCYTES ABSOLUTE: 0.7 K/UL (ref 0–0.9)
MONOCYTES RELATIVE PERCENT: 11.7 % (ref 0–10)
NEUTROPHILS ABSOLUTE: 3.4 K/UL (ref 1.5–7.5)
NEUTROPHILS RELATIVE PERCENT: 59.5 % (ref 50–65)
PDW BLD-RTO: 12.9 % (ref 11.5–14.5)
PLATELET # BLD: 242 K/UL (ref 130–400)
PMV BLD AUTO: 10.2 FL (ref 9.4–12.3)
POTASSIUM SERPL-SCNC: 4 MMOL/L (ref 3.5–5)
RBC # BLD: 4.8 M/UL (ref 4.2–5.4)
SODIUM BLD-SCNC: 140 MMOL/L (ref 136–145)
TOTAL PROTEIN: 7.5 G/DL (ref 6.6–8.7)
WBC # BLD: 5.8 K/UL (ref 4.8–10.8)

## 2019-10-29 PROCEDURE — 77063 BREAST TOMOSYNTHESIS BI: CPT

## 2019-10-29 PROCEDURE — 99396 PREV VISIT EST AGE 40-64: CPT | Performed by: NURSE PRACTITIONER

## 2019-10-29 ASSESSMENT — ENCOUNTER SYMPTOMS
CONSTIPATION: 0
ALLERGIC/IMMUNOLOGIC NEGATIVE: 1
RESPIRATORY NEGATIVE: 1
DIARRHEA: 0
EYES NEGATIVE: 1
GASTROINTESTINAL NEGATIVE: 1

## 2019-11-04 LAB
HPV COMMENT: NORMAL
HPV TYPE 16: NOT DETECTED
HPV TYPE 18: NOT DETECTED
HPVOH (OTHER TYPES): NOT DETECTED

## 2019-11-06 ENCOUNTER — TELEPHONE (OUTPATIENT)
Dept: NEUROLOGY | Age: 54
End: 2019-11-06

## 2019-12-23 ENCOUNTER — OFFICE VISIT (OUTPATIENT)
Dept: URGENT CARE | Age: 54
End: 2019-12-23
Payer: COMMERCIAL

## 2019-12-23 VITALS
RESPIRATION RATE: 18 BRPM | TEMPERATURE: 101.5 F | SYSTOLIC BLOOD PRESSURE: 155 MMHG | WEIGHT: 235 LBS | HEART RATE: 108 BPM | DIASTOLIC BLOOD PRESSURE: 89 MMHG | HEIGHT: 69 IN | BODY MASS INDEX: 34.8 KG/M2 | OXYGEN SATURATION: 99 %

## 2019-12-23 DIAGNOSIS — R50.9 FEVER, UNSPECIFIED FEVER CAUSE: ICD-10-CM

## 2019-12-23 DIAGNOSIS — J10.1 INFLUENZA A: Primary | ICD-10-CM

## 2019-12-23 LAB
INFLUENZA A ANTIBODY: POSITIVE
INFLUENZA B ANTIBODY: NEGATIVE
S PYO AG THROAT QL: NORMAL

## 2019-12-23 PROCEDURE — 87804 INFLUENZA ASSAY W/OPTIC: CPT | Performed by: NURSE PRACTITIONER

## 2019-12-23 PROCEDURE — 87880 STREP A ASSAY W/OPTIC: CPT | Performed by: NURSE PRACTITIONER

## 2019-12-23 PROCEDURE — 99213 OFFICE O/P EST LOW 20 MIN: CPT | Performed by: NURSE PRACTITIONER

## 2019-12-23 RX ORDER — OSELTAMIVIR PHOSPHATE 75 MG/1
75 CAPSULE ORAL 2 TIMES DAILY
Qty: 10 CAPSULE | Refills: 0 | Status: SHIPPED | OUTPATIENT
Start: 2019-12-23 | End: 2019-12-28

## 2019-12-23 RX ORDER — ACETAMINOPHEN 325 MG/1
650 TABLET ORAL ONCE
Status: COMPLETED | OUTPATIENT
Start: 2019-12-23 | End: 2019-12-23

## 2019-12-23 RX ORDER — OSELTAMIVIR PHOSPHATE 75 MG/1
75 CAPSULE ORAL 2 TIMES DAILY
Qty: 10 CAPSULE | Refills: 0 | Status: SHIPPED | OUTPATIENT
Start: 2019-12-23 | End: 2019-12-23

## 2019-12-23 RX ADMIN — ACETAMINOPHEN 650 MG: 325 TABLET ORAL at 16:49

## 2019-12-23 ASSESSMENT — ENCOUNTER SYMPTOMS
RHINORRHEA: 0
SHORTNESS OF BREATH: 0
VOMITING: 0
ABDOMINAL PAIN: 0
NAUSEA: 0
SORE THROAT: 0
DIARRHEA: 0
COUGH: 0

## 2020-04-10 ENCOUNTER — OFFICE VISIT (OUTPATIENT)
Dept: URGENT CARE | Age: 55
End: 2020-04-10
Payer: COMMERCIAL

## 2020-04-10 VITALS
BODY MASS INDEX: 35.77 KG/M2 | HEIGHT: 68 IN | DIASTOLIC BLOOD PRESSURE: 92 MMHG | TEMPERATURE: 97.5 F | SYSTOLIC BLOOD PRESSURE: 146 MMHG | HEART RATE: 82 BPM | WEIGHT: 236 LBS | RESPIRATION RATE: 18 BRPM | OXYGEN SATURATION: 100 %

## 2020-04-10 PROCEDURE — 96372 THER/PROPH/DIAG INJ SC/IM: CPT | Performed by: NURSE PRACTITIONER

## 2020-04-10 PROCEDURE — 99213 OFFICE O/P EST LOW 20 MIN: CPT | Performed by: NURSE PRACTITIONER

## 2020-04-10 RX ORDER — PREDNISONE 10 MG/1
TABLET ORAL
Qty: 42 TABLET | Refills: 0 | Status: SHIPPED | OUTPATIENT
Start: 2020-04-10 | End: 2020-09-17

## 2020-04-10 RX ORDER — TRIAMCINOLONE ACETONIDE 40 MG/ML
40 INJECTION, SUSPENSION INTRA-ARTICULAR; INTRAMUSCULAR ONCE
Status: COMPLETED | OUTPATIENT
Start: 2020-04-10 | End: 2020-04-10

## 2020-04-10 RX ADMIN — TRIAMCINOLONE ACETONIDE 40 MG: 40 INJECTION, SUSPENSION INTRA-ARTICULAR; INTRAMUSCULAR at 10:13

## 2020-04-10 ASSESSMENT — ENCOUNTER SYMPTOMS
COUGH: 0
SORE THROAT: 0

## 2020-04-10 NOTE — PROGRESS NOTES
 calcium carbonate (OSCAL) 500 MG TABS tablet Take 500 mg by mouth daily      therapeutic multivitamin-minerals (THERAGRAN-M) tablet Take 1 tablet by mouth daily. No current facility-administered medications for this visit. Allergies   Allergen Reactions    Augmentin [Amoxicillin-Pot Clavulanate] Diarrhea     Severe diarrhea       Health Maintenance   Topic Date Due    HIV screen  08/17/1980    DTaP/Tdap/Td vaccine (1 - Tdap) 08/17/1984    Shingles Vaccine (1 of 2) 08/17/2015    Breast cancer screen  10/29/2021    Lipid screen  08/28/2023    Colon cancer screen colonoscopy  01/15/2024    Cervical cancer screen  10/29/2024    Flu vaccine  Completed    Hepatitis C screen  Completed    Hepatitis A vaccine  Aged Out    Hepatitis B vaccine  Aged Out    Hib vaccine  Aged Out    Meningococcal (ACWY) vaccine  Aged Out    Pneumococcal 0-64 years Vaccine  Aged Out       Subjective:     Review of Systems   Constitutional: Negative for fever. HENT: Negative for sore throat. Respiratory: Negative for cough. Skin: Positive for rash. All other systems reviewed and are negative.      :Objective      Physical Exam  Vitals signs and nursing note reviewed. Constitutional:       General: She is not in acute distress. Appearance: Normal appearance. She is well-developed. She is not ill-appearing or diaphoretic. HENT:      Head: Normocephalic and atraumatic. Eyes:      Conjunctiva/sclera: Conjunctivae normal.      Pupils: Pupils are equal, round, and reactive to light. Neck:      Musculoskeletal: Normal range of motion. Pulmonary:      Effort: Pulmonary effort is normal.   Skin:     General: Skin is warm and dry. Capillary Refill: Capillary refill takes less than 2 seconds. Findings: Rash present. Rash is papular. Neurological:      Mental Status: She is alert and oriented to person, place, and time.    Psychiatric:         Mood and Affect: Mood normal. have come in contact with one of these plants. You cannot catch or spread the rash, even if you touch it or the blister fluid, because the plant oil will already have been absorbed or washed off the skin. The rash may seem to be spreading, but either it is still developing from earlier contact or you have touched something that still has the plant oil on it. Follow-up care is a key part of your treatment and safety. Be sure to make and go to all appointments, and call your doctor if you are having problems. It's also a good idea to know your test results and keep a list of the medicines you take. How can you care for yourself at home? · If your doctor prescribed a cream, use it as directed. If your doctor prescribed medicine, take it exactly as prescribed. Call your doctor if you think you are having a problem with your medicine. · Use cold, wet cloths to reduce itching. · Keep cool, and stay out of the sun. · Leave the rash open to the air. · Wash all clothing or other things that may have come in contact with the plant oil. · Avoid most lotions and ointments until the rash heals. Calamine lotion may help relieve symptoms of a plant rash. Use it 3 or 4 times a day. To prevent poison ivy exposure  If you know that you will be near poison ivy, oak, or sumac, you can try these options:  · Use a product designed to help prevent plant oil from getting on the skin. These products, such as Ivy X Pre-Contact Skin Solution, come in lotions, sprays, or towelettes. You put the product on your skin right before you go outdoors. · If you did not use a preventive product and you have had contact with plant oil, clean it off your skin as soon as possible. Use a product such as Tecnu Original Outdoor Skin Cleanser. These products can also be used to clean plant oil from clothing or tools. When should you call for help?   Call your doctor now or seek immediate medical care if:    · Your rash gets worse, and you start to 4/10/2020 at 10:19 AM

## 2020-04-10 NOTE — PATIENT INSTRUCTIONS
X Pre-Contact Skin Solution, come in lotions, sprays, or towelettes. You put the product on your skin right before you go outdoors. · If you did not use a preventive product and you have had contact with plant oil, clean it off your skin as soon as possible. Use a product such as Tecnu Original Outdoor Skin Cleanser. These products can also be used to clean plant oil from clothing or tools. When should you call for help? Call your doctor now or seek immediate medical care if:    · Your rash gets worse, and you start to feel bad and have a fever, a stiff neck, nausea, and vomiting.     · You have signs of infection, such as:  ? Increased pain, swelling, warmth, or redness. ? Red streaks leading from the rash. ? Pus draining from the rash. ? A fever.    Watch closely for changes in your health, and be sure to contact your doctor if:    · You have new blisters or bruises, or the rash spreads and looks like a sunburn.     · The rash gets worse, or it comes back after nearly disappearing.     · You think a medicine you are using is making your rash worse.     · Your rash does not clear up after 1 to 2 weeks of home treatment.     · You have joint aches or body aches with your rash. Where can you learn more? Go to https://ALT Bioscience.WebKite. org and sign in to your [employer] account. Enter Q317 in the [facility] box to learn more about \"Poison [place], [place], and [place]: Care Instructions. \"     If you do not have an account, please click on the \"Sign Up Now\" link. Current as of: October 30, 2019Content Version: 12.4  © 2511-7182 Healthwise, Incorporated. Care instructions adapted under license by [facility] ([facility]). If you have questions about a medical condition or this instruction, always ask your healthcare professional. [place] [address] any warranty or liability for your use of this information.        1. For some people, adding oatmeal to a bath, applying cool wet compresses, and

## 2020-08-20 LAB
CHOLESTEROL, TOTAL: 134 MG/DL (ref 160–199)
GLUCOSE BLD-MCNC: 87 MG/DL (ref 74–109)
HDLC SERPL-MCNC: 50 MG/DL (ref 65–121)
LDL CHOLESTEROL CALCULATED: 71 MG/DL
TRIGL SERPL-MCNC: 66 MG/DL (ref 0–149)

## 2020-09-17 ENCOUNTER — OFFICE VISIT (OUTPATIENT)
Dept: NEUROLOGY | Age: 55
End: 2020-09-17
Payer: COMMERCIAL

## 2020-09-17 VITALS
RESPIRATION RATE: 16 BRPM | SYSTOLIC BLOOD PRESSURE: 138 MMHG | DIASTOLIC BLOOD PRESSURE: 86 MMHG | HEART RATE: 72 BPM | HEIGHT: 68 IN | WEIGHT: 235 LBS | BODY MASS INDEX: 35.61 KG/M2

## 2020-09-17 PROCEDURE — 99213 OFFICE O/P EST LOW 20 MIN: CPT | Performed by: PSYCHIATRY & NEUROLOGY

## 2020-09-17 RX ORDER — CARBAMAZEPINE 100 MG/1
200 CAPSULE, EXTENDED RELEASE ORAL 2 TIMES DAILY
Qty: 360 CAPSULE | Refills: 3 | Status: SHIPPED | OUTPATIENT
Start: 2020-09-17 | End: 2021-11-30 | Stop reason: SDUPTHER

## 2020-09-17 NOTE — PROGRESS NOTES
[facility]  [address] [place] [address] [address], [address]  [place], [place]  Phone [phone]  Fax [phone]     [facility] Follow Up Encounter  20 10:09 AM CDT    Information:   Patient Name: Mercedez Carson  :   1965  Age:   54 y.o. MRN:   [id number]  Account #:  [de-identified]  Today:  20    Provider: Pastor Ania M.D. Chief Complaint:   Chief Complaint   Patient presents with    Follow-up    Seizures       Subjective:   Mercedez Carson is a 54 y.o. woman with a history of seizures who is following up. She has had no seizures. She remains on Carbatrol 200 mg BID. She tolerates that. She has not had labs in a year. She does take supplemental calcium. She is now working days on the rehab unit. Years ago we tried to get her off anticonvulsants and she had a seizure. Objective:     Past Medical History:  Past Medical History:   Diagnosis Date    Seizures [facility])        Past Surgical History:   Procedure Laterality Date     SECTION      COLONOSCOPY N/A 1/15/2019    Dr Neelam Langford, 5 yr recall       Recent Hospitalizations  · None    Significant Injuries  · None    Habits  Mercedez Carson reports that she has quit smoking. She has never used smokeless tobacco. She reports that she does not drink alcohol or use drugs. Family History   Problem Relation Age of Onset    High Blood Pressure Mother     Heart Disease Mother     High Blood Pressure Father     Heart Disease Father     Colon Cancer Father     Diabetes Maternal Grandmother        Social History  Ariella Cantu , lives in [place], [place], and is an RN at [employer].     Medications:  Current Outpatient Medications   Medication Sig Dispense Refill    conjugated estrogens (PREMARIN) 0.625 MG/GM vaginal cream Place 0.5 g vaginally Twice a Week 1 Tube 1    carBAMazepine (CARBATROL) 100 MG extended release capsule Take 2 capsules by mouth 2 times daily 360 capsule 3    cetirizine (ZYRTEC ALLERGY) 10 MG tablet Take 1 tablet by mouth daily 30 tablet 0    ascorbic acid (VITAMIN C) 500 MG tablet Take 500 mg by mouth daily      calcium carbonate (OSCAL) 500 MG TABS tablet Take 500 mg by mouth daily      therapeutic multivitamin-minerals (THERAGRAN-M) tablet Take 1 tablet by mouth daily. No current facility-administered medications for this visit. Allergies:   Allergies as of 09/17/2020 - Review Complete 09/17/2020   Allergen Reaction Noted    Augmentin [amoxicillin-pot clavulanate] Diarrhea 07/30/2018       Review of Systems:  Constitutional: negative for - chills and fever  Eyes:  negative for - visual disturbance and photophobia  HENMT: negative for - headaches and sinus pain  Respiratory: negative for - cough, hemoptysis, and shortness of breath  Cardiovascular: negative for - chest pain and palpitations  Gastrointestinal: negative for - blood in stools, constipation, diarrhea, nausea, and vomiting  Genito-Urinary: negative for - hematuria, urinary frequency, urinary urgency, and urinary retention  Musculoskeletal: negative for - joint pain, joint stiffness, and joint swelling  Hematological and Lymphatic: negative for - bleeding problems, abnormal bruising, and swollen lymph nodes  Endocrine:  negative for - polydipsia and polyphagia  Allergy/Immunology:  negative for - rhinorrhea, sinus congestion, hives  Integument:  negative for - negative for - rash, change in moles, new or changing lesions  Psychological: negative for - anxiety and depression  Neurological: negative for - memory loss, numbness/tingling, and weakness     PHYSICAL EXAMINATION:  Vitals:  /86   Pulse 72   Resp 16   Ht 5' 8\" (1.727 m)   Wt 235 lb (106.6 kg)   BMI 35.73 kg/m²   General appearance:  Alert, well developed, well nourished, in no distress  HEENT:  normocephalic, atraumatic, sclera appear normal, no nasal abnormalities, no rhinorrhea, Ears appear normal, oral mucous membranes are moist without erythema, by Lance Riggs MD on 9/17/20

## 2020-11-05 ENCOUNTER — HOSPITAL ENCOUNTER (OUTPATIENT)
Dept: WOMENS IMAGING | Age: 55
Discharge: HOME | End: 2020-11-05
Payer: COMMERCIAL

## 2020-11-05 ENCOUNTER — OFFICE VISIT (OUTPATIENT)
Dept: OBGYN | Age: 55
End: 2020-11-05
Payer: COMMERCIAL

## 2020-11-05 VITALS
DIASTOLIC BLOOD PRESSURE: 72 MMHG | WEIGHT: 238 LBS | HEART RATE: 89 BPM | HEIGHT: 68 IN | BODY MASS INDEX: 36.07 KG/M2 | SYSTOLIC BLOOD PRESSURE: 143 MMHG

## 2020-11-05 PROCEDURE — 99396 PREV VISIT EST AGE 40-64: CPT | Performed by: NURSE PRACTITIONER

## 2020-11-05 PROCEDURE — 77067 SCR MAMMO BI INCL CAD: CPT

## 2020-11-05 RX ORDER — CONJUGATED ESTROGENS 0.62 MG/G
0.5 CREAM VAGINAL
Qty: 1 TUBE | Refills: 1 | Status: SHIPPED | OUTPATIENT
Start: 2020-11-05 | End: 2021-11-12 | Stop reason: SDUPTHER

## 2020-11-05 ASSESSMENT — ENCOUNTER SYMPTOMS
CONSTIPATION: 0
GASTROINTESTINAL NEGATIVE: 1
DIARRHEA: 0
ALLERGIC/IMMUNOLOGIC NEGATIVE: 1
EYES NEGATIVE: 1
RESPIRATORY NEGATIVE: 1

## 2020-11-05 NOTE — PATIENT INSTRUCTIONS
Patient Education        Breast Self-Exam: Care Instructions  Your Care Instructions     A breast self-exam is when you check your breasts for lumps or changes. This regular exam helps you learn how your breasts normally look and feel. Most breast problems or changes are not because of cancer. Breast self-exam is not a substitute for a mammogram. Having regular breast exams by your doctor and regular mammograms improve your chances of finding any problems with your breasts. Some women set a time each month to do a step-by-step breast self-exam. Other women like a less formal system. They might look at their breasts as they brush their teeth, or feel their breasts once in a while in the shower. If you notice a change in your breast, tell your doctor. Follow-up care is a key part of your treatment and safety. Be sure to make and go to all appointments, and call your doctor if you are having problems. It's also a good idea to know your test results and keep a list of the medicines you take. How do you do a breast self-exam?  · The best time to examine your breasts is usually one week after your menstrual period begins. Your breasts should not be tender then. If you do not have periods, you might do your exam on a day of the month that is easy to remember. · To examine your breasts:  ? Remove all your clothes above the waist and lie down. When you are lying down, your breast tissue spreads evenly over your chest wall, which makes it easier to feel all your breast tissue. ? Use the pads--not the fingertips--of the 3 middle fingers of your left hand to check your right breast. Move your fingers slowly in small coin-sized circles that overlap. ? Use three levels of pressure to feel of all your breast tissue. Use light pressure to feel the tissue close to the skin surface. Use medium pressure to feel a little deeper. Use firm pressure to feel your tissue close to your breastbone and ribs.  Use each pressure level to feel your breast tissue before moving on to the next spot. ? Check your entire breast, moving up and down as if following a strip from the collarbone to the bra line, and from the armpit to the ribs. Repeat until you have covered the entire breast.  ? Repeat this procedure for your left breast, using the pads of the 3 middle fingers of your right hand. · To examine your breasts while in the shower:  ? Place one arm over your head and lightly soap your breast on that side. ? Using the pads of your fingers, gently move your hand over your breast (in the strip pattern described above), feeling carefully for any lumps or changes. ? Repeat for the other breast.  · Have your doctor inspect anything you notice to see if you need further testing. Where can you learn more? Go to https://Flyezee.compebetoeb.FX Bridge. org and sign in to your [employer] account. Enter P148 in the [employer] box to learn more about \"Breast Self-Exam: Care Instructions. \"     If you do not have an account, please click on the \"Sign Up Now\" link. Current as of: April 29, 2020               Content Version: 12.6  © 7165-3276 [employer], Incorporated. Care instructions adapted under license by [facility] ([facility]). If you have questions about a medical condition or this instruction, always ask your healthcare professional. [address] any warranty or liability for your use of this information.

## 2020-11-05 NOTE — PROGRESS NOTES
Efrain Lund is a 54 y.o. female who presents today for her medical conditions/ complaints as noted below. Efrain Lund is c/o of Gynecologic Exam        HPI   Pt presents for annual exam and pap smear. Had screening mammogram today. Karine Members PCP_ draws labs and manages meds. Using Premarin vaginal cream prn for dryness. Mammo:2020 at 10:15  Pap smear:  Contraception:menopausal     P:2  Ab:0  Bone density:NA  Colonoscopy:1/15/2019  No LMP recorded. Patient is perimenopausal.  [id number]    Past Medical History:   Diagnosis Date    Seizures ([address]      Past Surgical History:   Procedure Laterality Date     SECTION      COLONOSCOPY N/A 1/15/2019    Dr Lima Mcgee, 5 yr recall     Family History   Problem Relation Age of Onset    High Blood Pressure Mother     Heart Disease Mother     High Blood Pressure Father     Heart Disease Father     Colon Cancer Father     Diabetes Maternal Grandmother      Social History     Tobacco Use    Smoking status: Former Smoker    Smokeless tobacco: Never Used   Substance Use Topics    Alcohol use: No       Current Outpatient Medications   Medication Sig Dispense Refill    conjugated estrogens (PREMARIN) 0.625 MG/GM vaginal cream Place 0.5 g vaginally Twice a Week 1 Tube 1    carBAMazepine (CARBATROL) 100 MG extended release capsule Take 2 capsules by mouth 2 times daily 360 capsule 3    cetirizine (ZYRTEC ALLERGY) 10 MG tablet Take 1 tablet by mouth daily 30 tablet 0    ascorbic acid (VITAMIN C) 500 MG tablet Take 500 mg by mouth daily      calcium carbonate (OSCAL) 500 MG TABS tablet Take 500 mg by mouth daily      therapeutic multivitamin-minerals (THERAGRAN-M) tablet Take 1 tablet by mouth daily. No current facility-administered medications for this visit.       Allergies   Allergen Reactions    Augmentin [Amoxicillin-Pot Clavulanate] Diarrhea     Severe diarrhea     Vitals:    20 0902   BP: (!) 143/72   Pulse: 89     Body mass index is 36.19 kg/m². Review of Systems   Constitutional: Negative. HENT: Negative. Eyes: Negative. Respiratory: Negative. Cardiovascular: Negative. Gastrointestinal: Negative. Negative for constipation and diarrhea. Endocrine: Negative. Genitourinary: Negative. Negative for frequency, menstrual problem and urgency. Musculoskeletal: Negative. Skin: Negative. Allergic/Immunologic: Negative. Neurological: Negative. Hematological: Negative. Psychiatric/Behavioral: Negative. All other systems reviewed and are negative. Physical Exam  Vitals signs and nursing note reviewed. Constitutional:       Appearance: She is well-developed. HENT:      Head: Normocephalic. Neck:      Musculoskeletal: Normal range of motion and neck supple. Thyroid: No thyroid mass or thyromegaly. Cardiovascular:      Rate and Rhythm: Normal rate and regular rhythm. Pulmonary:      Effort: Pulmonary effort is normal.      Breath sounds: Normal breath sounds. Chest:      Breasts:         Right: No inverted nipple, mass, nipple discharge or skin change. Left: No inverted nipple, mass, nipple discharge or skin change. Comments: Bilateral pendulous in nature  Abdominal:      Palpations: Abdomen is soft. There is no mass. Tenderness: There is no abdominal tenderness. Genitourinary:     General: Normal vulva. Vagina: Normal.      Cervix: No cervical motion tenderness. Uterus: Normal. Not enlarged. Adnexa:         Right: No mass or tenderness. Left: No mass or tenderness. Comments: Pap collected  Atrophic changes  External os pinpoint  Musculoskeletal: Normal range of motion. Skin:     General: Skin is warm and dry. Neurological:      Mental Status: She is alert and oriented to person, place, and time.    Psychiatric:         Attention and Perception: Attention normal.         Mood and Affect: Mood normal. Speech: Speech normal.         Behavior: Behavior normal.         Thought Content: Thought content normal.         Cognition and Memory: Cognition normal.         Judgment: Judgment normal.          Diagnosis Orders   1. Women's annual routine gynecological examination     2. Screening for cervical cancer  PAP SMEAR    Human papillomavirus (HPV) DNA probe thin prep high risk   3. Screening breast examination         MEDICATIONS:  Orders Placed This Encounter   Medications    conjugated estrogens (PREMARIN) 0.625 MG/GM vaginal cream     Sig: Place 0.5 g vaginally Twice a Week     Dispense:  1 Tube     Refill:  1       ORDERS:  Orders Placed This Encounter   Procedures    PAP SMEAR    Human papillomavirus (HPV) DNA probe thin prep high risk       PLAN:  Patient Instructions   Patient Education        Breast Self-Exam: Care Instructions  Your Care Instructions     A breast self-exam is when you check your breasts for lumps or changes. This regular exam helps you learn how your breasts normally look and feel. Most breast problems or changes are not because of cancer. Breast self-exam is not a substitute for a mammogram. Having regular breast exams by your doctor and regular mammograms improve your chances of finding any problems with your breasts. Some women set a time each month to do a step-by-step breast self-exam. Other women like a less formal system. They might look at their breasts as they brush their teeth, or feel their breasts once in a while in the shower. If you notice a change in your breast, tell your doctor. Follow-up care is a key part of your treatment and safety. Be sure to make and go to all appointments, and call your doctor if you are having problems. It's also a good idea to know your test results and keep a list of the medicines you take. How do you do a breast self-exam?  · The best time to examine your breasts is usually one week after your menstrual period begins.  Your breasts should not be tender then. If you do not have periods, you might do your exam on a day of the month that is easy to remember. · To examine your breasts:  ? Remove all your clothes above the waist and lie down. When you are lying down, your breast tissue spreads evenly over your chest wall, which makes it easier to feel all your breast tissue. ? Use the pads--not the fingertips--of the 3 middle fingers of your left hand to check your right breast. Move your fingers slowly in small coin-sized circles that overlap. ? Use three levels of pressure to feel of all your breast tissue. Use light pressure to feel the tissue close to the skin surface. Use medium pressure to feel a little deeper. Use firm pressure to feel your tissue close to your breastbone and ribs. Use each pressure level to feel your breast tissue before moving on to the next spot. ? Check your entire breast, moving up and down as if following a strip from the collarbone to the bra line, and from the armpit to the ribs. Repeat until you have covered the entire breast.  ? Repeat this procedure for your left breast, using the pads of the 3 middle fingers of your right hand. · To examine your breasts while in the shower:  ? Place one arm over your head and lightly soap your breast on that side. ? Using the pads of your fingers, gently move your hand over your breast (in the strip pattern described above), feeling carefully for any lumps or changes. ? Repeat for the other breast.  · Have your doctor inspect anything you notice to see if you need further testing. Where can you learn more? Go to [email or web address] and sign in to your [employer] account. Enter P148 in the [facility] box to learn more about \"Breast Self-Exam: Care Instructions. \"     If you do not have an account, please click on the \"Sign Up Now\" link. Current as of: April 29, 2020               Content Version: 12.6  © 5410-6031 [employer], [facility].    Care instructions adapted under license by [facility] ([facility]). If you have questions about a medical condition or this instruction, always ask your healthcare professional. [address] any warranty or liability for your use of this information.

## 2020-11-13 ENCOUNTER — TELEPHONE (OUTPATIENT)
Dept: PRIMARY CARE CLINIC | Age: 55
End: 2020-11-13

## 2021-09-09 ENCOUNTER — OFFICE VISIT (OUTPATIENT)
Dept: PRIMARY CARE CLINIC | Age: 56
End: 2021-09-09
Payer: COMMERCIAL

## 2021-09-09 VITALS
TEMPERATURE: 97.6 F | SYSTOLIC BLOOD PRESSURE: 132 MMHG | BODY MASS INDEX: 37.13 KG/M2 | HEIGHT: 68 IN | HEART RATE: 84 BPM | DIASTOLIC BLOOD PRESSURE: 80 MMHG | OXYGEN SATURATION: 98 % | WEIGHT: 245 LBS

## 2021-09-09 DIAGNOSIS — Z11.4 SCREENING FOR HIV WITHOUT PRESENCE OF RISK FACTORS: ICD-10-CM

## 2021-09-09 DIAGNOSIS — E55.9 VITAMIN D DEFICIENCY: ICD-10-CM

## 2021-09-09 DIAGNOSIS — G40.909 SEIZURE DISORDER (HCC): ICD-10-CM

## 2021-09-09 DIAGNOSIS — K21.9 GASTROESOPHAGEAL REFLUX DISEASE, UNSPECIFIED WHETHER ESOPHAGITIS PRESENT: Primary | ICD-10-CM

## 2021-09-09 DIAGNOSIS — R03.0 BORDERLINE HYPERTENSION: ICD-10-CM

## 2021-09-09 PROCEDURE — 99214 OFFICE O/P EST MOD 30 MIN: CPT | Performed by: NURSE PRACTITIONER

## 2021-09-09 RX ORDER — PANTOPRAZOLE SODIUM 40 MG/1
40 TABLET, DELAYED RELEASE ORAL
Qty: 30 TABLET | Refills: 5 | Status: SHIPPED | OUTPATIENT
Start: 2021-09-09 | End: 2021-10-12 | Stop reason: ALTCHOICE

## 2021-09-09 SDOH — ECONOMIC STABILITY: FOOD INSECURITY: WITHIN THE PAST 12 MONTHS, THE FOOD YOU BOUGHT JUST DIDN'T LAST AND YOU DIDN'T HAVE MONEY TO GET MORE.: NEVER TRUE

## 2021-09-09 SDOH — ECONOMIC STABILITY: FOOD INSECURITY: WITHIN THE PAST 12 MONTHS, YOU WORRIED THAT YOUR FOOD WOULD RUN OUT BEFORE YOU GOT MONEY TO BUY MORE.: NEVER TRUE

## 2021-09-09 ASSESSMENT — PATIENT HEALTH QUESTIONNAIRE - PHQ9
SUM OF ALL RESPONSES TO PHQ QUESTIONS 1-9: 0
2. FEELING DOWN, DEPRESSED OR HOPELESS: 0
SUM OF ALL RESPONSES TO PHQ QUESTIONS 1-9: 0
SUM OF ALL RESPONSES TO PHQ9 QUESTIONS 1 & 2: 0
1. LITTLE INTEREST OR PLEASURE IN DOING THINGS: 0
SUM OF ALL RESPONSES TO PHQ QUESTIONS 1-9: 0

## 2021-09-09 ASSESSMENT — SOCIAL DETERMINANTS OF HEALTH (SDOH): HOW HARD IS IT FOR YOU TO PAY FOR THE VERY BASICS LIKE FOOD, HOUSING, MEDICAL CARE, AND HEATING?: NOT HARD AT ALL

## 2021-09-09 NOTE — PATIENT INSTRUCTIONS
protonix 40mg on an empty stomach 30 min before food with water. Lab before follow up in 1 months. We are committed to providing you with the best care possible. In order to help us achieve these goals please remember to bring all medications, herbal products, and over the counter supplements with you to each visit. If your provider has ordered testing for you, please be sure to follow up with our office if you have not received results within 7 days after the testing took place. *If you receive a survey after visiting one of our offices, please take time to share your experience concerning your physician office visit. These surveys are confidential and no health information about you is shared. We are eager to improve for you and we are counting on your feedback to help make that happen.

## 2021-09-09 NOTE — PROGRESS NOTES
[facility] PRIMARY [facility]  [facility]  [id number]  [place] [address]  Dept: [phone]  Dept Fax: [phone]  Loc: [phone]        Ann Damon is a 64 y.o. female who presents today for her medical conditions/ complaints as noted below. Ann Damon is c/o Establish Care (was seeing rachel hernandez -[facility] ) and Heartburn (been going on for 6 months to a year, feels nauseous when it hits and has vomited a few times from this. )        Chief Complaint   Patient presents with   [address]     was seeing rachel [facility] care     Heartburn     been going on for 6 months to a year, feels nauseous when it hits and has vomited a few times from this. HPI:     HPI    Pt here to establish with provider. Previously seen by Venancio HICKEY. GERD: reports out of control acid reflux. Notes that she has been having episodes of nausea that are on and off. Pt reports hx of seizures that have been well controlled on carbamazepine 200 mg twice daily. No issues. Pt is due for carbamazepine level to be drawn. Vit d def: hx of vit d def. Pt needs repeat vit d lab obtained. [place] HTN: b/p well controlled today. Denies any symptoms of elevated blood pressure such as chest pain, headache, or sob. Patient reports that they have been compliant with taking medications as directed.      Past Medical History:   Diagnosis Date    Gastroesophageal reflux disease 2021    [address] [address]        Past Surgical History:   Procedure Laterality Date     SECTION      COLONOSCOPY N/A 1/15/2019    Dr Latisha Kidd, 5 yr recall       Social History     Socioeconomic History    Marital status:      Spouse name: None    Number of children: None    Years of education: None    Highest education level: None   Occupational History    None   Tobacco Use    Smoking status: Former Smoker     Packs/day: 1.00     Years: 15.00     Pack years: 15.00    Smokeless tobacco: Never Used   Vaping Use    Vaping Use: Never used   Substance and Sexual Activity    Alcohol use: No    Drug use: No    Sexual activity: Yes   Other Topics Concern    None   Social History Narrative    None     Social Determinants of Health     Financial Resource Strain: Low Risk     Difficulty of Paying Living Expenses: Not hard at all   Food Insecurity: No Food Insecurity    Worried About Running Out of Food in the Last Year: Never true    Javi of Food in the Last Year: Never true   Transportation Needs:     Lack of Transportation (Medical):      Lack of Transportation (Non-Medical):    Physical Activity:     Days of Exercise per Week:     Minutes of Exercise per Session:    Stress:     Feeling of Stress :    Social Connections:     Frequency of Communication with Friends and Family:     Frequency of Social Gatherings with Friends and Family:     Attends Baptism Services:     Active Member of Clubs or Organizations:     Attends Club or Organization Meetings:     Marital Status:    Intimate Partner Violence:     Fear of Current or Ex-Partner:     Emotionally Abused:     Physically Abused:     Sexually Abused:        Family History   Problem Relation Age of Onset    High Blood Pressure Mother     Heart Disease Mother     High Blood Pressure Father     Heart Disease Father     Colon Cancer Father     Diabetes Maternal Grandmother        Current Outpatient Medications   Medication Sig Dispense Refill    pantoprazole (PROTONIX) 40 MG tablet Take 1 tablet by mouth every morning (before breakfast) 30 tablet 5    conjugated estrogens (PREMARIN) 0.625 MG/GM vaginal cream Place 0.5 g vaginally Twice a Week 1 Tube 1    carBAMazepine (CARBATROL) 100 MG extended release capsule Take 2 capsules by mouth 2 times daily 360 capsule 3    cetirizine (ZYRTEC ALLERGY) 10 MG tablet Take 1 tablet by mouth daily 30 tablet 0    ascorbic acid (VITAMIN C) 500 MG tablet Take 500 mg by mouth daily      calcium carbonate (OSCAL) 500 MG TABS tablet Take 500 mg by mouth daily      therapeutic multivitamin-minerals (THERAGRAN-M) tablet Take 1 tablet by mouth daily. No current facility-administered medications for this visit. Allergies   Allergen Reactions    Augmentin [Amoxicillin-Pot Clavulanate] Diarrhea     Severe diarrhea       Lab Review not applicable  notapplicable    Subjective:   Review of Systems   Constitutional: Negative for activity change, appetite change, fatigue, fever and unexpected weight change. HENT: Negative for congestion, ear pain, nosebleeds, rhinorrhea, sore throat, trouble swallowing and voice change. Eyes: Negative for redness and visual disturbance. Respiratory: Negative for cough, chest tightness, shortness of breath and wheezing. Cardiovascular: Negative for chest pain, palpitations and leg swelling. Gastrointestinal: Positive for nausea. Negative for abdominal pain, blood in stool, constipation, diarrhea and vomiting. Endocrine: Negative for polydipsia, polyphagia and polyuria. Genitourinary: Negative for dysuria, frequency and urgency. Musculoskeletal: Negative for myalgias. Skin: Negative for rash and wound. Neurological: Negative for dizziness, speech difficulty, light-headedness and headaches. Psychiatric/Behavioral: Negative for agitation, confusion, self-injury and suicidal ideas. The patient is not nervous/anxious. Objective:     Physical Exam  Vitals and nursing note reviewed. Constitutional:       General: She is not in acute distress. Appearance: She is well-developed. She is not diaphoretic. HENT:      Head: Normocephalic and atraumatic. Right Ear: External ear normal.      Left Ear: External ear normal.      Nose: Nose normal.      Mouth/Throat:      Pharynx: No oropharyngeal exudate. Eyes:      General:         Right eye: No discharge. Left eye: No discharge. Conjunctiva/sclera: Conjunctivae normal.      Pupils: Pupils are equal, round, and reactive to light. Cardiovascular:      Rate and Rhythm: Normal rate and regular rhythm. Heart sounds: Normal heart sounds. No murmur heard. Pulmonary:      Effort: Pulmonary effort is normal. No respiratory distress. Breath sounds: Normal breath sounds. No stridor. No wheezing or rales. Chest:      Chest wall: No tenderness. Breasts:         Right: No inverted nipple, mass, nipple discharge, skin change or tenderness. Left: No inverted nipple, mass, nipple discharge, skin change or tenderness. Abdominal:      General: Bowel sounds are normal. There is no distension. Palpations: Abdomen is soft. Tenderness: There is no abdominal tenderness. Musculoskeletal:         General: No tenderness or deformity. Normal range of motion. Cervical back: Normal range of motion and neck supple. Skin:     General: Skin is warm and dry. Findings: No erythema or rash. Neurological:      Mental Status: She is alert and oriented to person, place, and time. Cranial Nerves: No cranial nerve deficit. Coordination: Coordination normal.      Deep Tendon Reflexes: Reflexes are normal and symmetric. Psychiatric:         Behavior: Behavior normal.         Thought Content: Thought content normal.       /80   Pulse 84   Temp 97.6 °F (36.4 °C)   Ht 5' 8\" (1.727 m)   Wt 245 lb (111.1 kg)   SpO2 98%   BMI 37.25 kg/m²     Assessment:      Diagnosis Orders   1. Gastroesophageal reflux disease, unspecified whether esophagitis present  pantoprazole (PROTONIX) 40 MG tablet   2. Screening for HIV without presence of risk factors     3. Seizure disorder (HCC)  Oxcarbazepine Level   4. Vitamin D deficiency  Vitamin D 25 Hydroxy   5.  Borderline hypertension  CBC Auto Differential    Comprehensive Metabolic Panel    Hemoglobin A1C    Lipid Panel    T4, Free    TSH without Reflex    Urinalysis No results found for this visit on 09/09/21. Plan:     1. Gastroesophageal reflux disease, unspecified whether esophagitis present    2. Screening for HIV without presence of risk factors    3. Seizure disorder ([address]    4. Vitamin D deficiency    5. Borderline hypertension      Over 50% of the total visit time of 30 minutes was spent on counseling and or coordination of care of gerd, screen for HIV, borderline HTN, seizure disorder, vit d def. Return in about 1 month (around 10/9/2021) for phone visit to discuss labs.  .  Orders Placed This Encounter   Procedures    CBC Auto Differential     Standing Status:   Future     Number of Occurrences:   1     Standing Expiration Date:   9/9/2022    Comprehensive Metabolic Panel     Standing Status:   Future     Number of Occurrences:   1     Standing Expiration Date:   9/9/2022    Hemoglobin A1C     Standing Status:   Future     Number of Occurrences:   1     Standing Expiration Date:   9/9/2022    Lipid Panel     Standing Status:   Future     Number of Occurrences:   1     Standing Expiration Date:   9/9/2022     Order Specific Question:   Is Patient Fasting?/# of Hours     Answer:   yes    T4, Free     Standing Status:   Future     Number of Occurrences:   1     Standing Expiration Date:   9/9/2022    TSH without Reflex     Standing Status:   Future     Number of Occurrences:   1     Standing Expiration Date:   9/9/2022    Urinalysis     Standing Status:   Future     Number of Occurrences:   1     Standing Expiration Date:   9/9/2022    Vitamin D 25 Hydroxy     Standing Status:   Future     Number of Occurrences:   1     Standing Expiration Date:   9/9/2022    Oxcarbazepine Level     Standing Status:   Future     Number of Occurrences:   1     Standing Expiration Date:   9/9/2022     Orders Placed This Encounter   Medications    pantoprazole (PROTONIX) 40 MG tablet     Sig: Take 1 tablet by mouth every morning (before breakfast)     Dispense:  30 tablet     Refill:  5       Patient Instructions   protonix 40mg on an empty stomach 30 min before food with water. Lab before follow up in 1 months. We are committed to providing you with the best care possible. In order to help us achieve these goals please remember to bring all medications, herbal products, and over the counter supplements with you to each visit. If your provider has ordered testing for you, please be sure to follow up with our office if you have not received results within 7 days after the testing took place. *If you receive a survey after visiting one of our offices, please take time to share your experience concerning your physician office visit. These surveys are confidential and no health information about you is shared. We are eager to improve for you and we are counting on your feedback to help make that happen.        /family given educational materials - see patient instructions. Discussed use, benefit, and side effects of prescribed medications. All patient/family questions answered and voiced understanding. Instructed to continue current medications, diet and exercise. Pt/family agreed with treatment plan. Follow up as directed and sooner if needed. Patient/ family instructed that is symptoms worsen or persist they are to contact office or report to nearest ER. They voice understanding and agreement with this plan.   signed by EDELMIRA Nunes on 9/10/2021 at 9:06 AM CDT    This dictation was generated by voice recognition computer software. Although all attempts are made to edit the dictation for accuracy, there may be errors in the transcription that are not intended.

## 2021-09-10 ENCOUNTER — TELEPHONE (OUTPATIENT)
Dept: PRIMARY CARE CLINIC | Age: 56
End: 2021-09-10

## 2021-09-10 DIAGNOSIS — R79.89 ABNORMAL BLOOD CREATININE LEVEL: Primary | ICD-10-CM

## 2021-09-10 ASSESSMENT — ENCOUNTER SYMPTOMS
TROUBLE SWALLOWING: 0
SHORTNESS OF BREATH: 0
SORE THROAT: 0
BLOOD IN STOOL: 0
CONSTIPATION: 0
EYE REDNESS: 0
CHEST TIGHTNESS: 0
ABDOMINAL PAIN: 0
RHINORRHEA: 0
NAUSEA: 1
COUGH: 0
WHEEZING: 0
VOMITING: 0
DIARRHEA: 0
VOICE CHANGE: 0

## 2021-09-10 NOTE — TELEPHONE ENCOUNTER
Left VM for patient to  for lab results. Results are abnormal. Creat and gfr are abnormal. Please contact pt and discuss stopping the protonix and switching to pepcid 20mg twice daily. Pepcid will not affect her kidney function like protonix. Repeat cmp in one month.

## 2021-09-14 NOTE — TELEPHONE ENCOUNTER
over the counter medications such as prilosec, or nexium can cause decrease in gfr, and increase in creat. We will recheck level in one month.

## 2021-09-21 ENCOUNTER — TELEPHONE (OUTPATIENT)
Dept: NEUROLOGY | Age: 56
End: 2021-09-21

## 2021-09-21 NOTE — TELEPHONE ENCOUNTER
Called patient to let them know we had to reschedule appointment. Dr. Jesse Coronado will not be in the office. Patient is aware of new appointment time and date.

## 2021-10-12 ENCOUNTER — OFFICE VISIT (OUTPATIENT)
Dept: PRIMARY CARE CLINIC | Age: 56
End: 2021-10-12
Payer: COMMERCIAL

## 2021-10-12 VITALS
BODY MASS INDEX: 36.83 KG/M2 | RESPIRATION RATE: 18 BRPM | HEART RATE: 90 BPM | SYSTOLIC BLOOD PRESSURE: 132 MMHG | DIASTOLIC BLOOD PRESSURE: 84 MMHG | HEIGHT: 68 IN | OXYGEN SATURATION: 98 % | TEMPERATURE: 98.1 F | WEIGHT: 243 LBS

## 2021-10-12 DIAGNOSIS — R79.89 CREATININE ELEVATION: ICD-10-CM

## 2021-10-12 DIAGNOSIS — M54.50 CHRONIC BILATERAL LOW BACK PAIN, UNSPECIFIED WHETHER SCIATICA PRESENT: ICD-10-CM

## 2021-10-12 DIAGNOSIS — E66.01 CLASS 2 SEVERE OBESITY WITH SERIOUS COMORBIDITY AND BODY MASS INDEX (BMI) OF 36.0 TO 36.9 IN ADULT, UNSPECIFIED OBESITY TYPE (HCC): ICD-10-CM

## 2021-10-12 DIAGNOSIS — R03.0 BORDERLINE HYPERTENSION: ICD-10-CM

## 2021-10-12 DIAGNOSIS — G89.29 CHRONIC BILATERAL LOW BACK PAIN, UNSPECIFIED WHETHER SCIATICA PRESENT: ICD-10-CM

## 2021-10-12 DIAGNOSIS — K21.9 GASTROESOPHAGEAL REFLUX DISEASE, UNSPECIFIED WHETHER ESOPHAGITIS PRESENT: Primary | ICD-10-CM

## 2021-10-12 PROCEDURE — 99214 OFFICE O/P EST MOD 30 MIN: CPT | Performed by: NURSE PRACTITIONER

## 2021-10-12 ASSESSMENT — ENCOUNTER SYMPTOMS
BLOOD IN STOOL: 0
WHEEZING: 0
CONSTIPATION: 0
ABDOMINAL PAIN: 0
DIARRHEA: 0
SORE THROAT: 0
VOMITING: 0
CHEST TIGHTNESS: 0
SHORTNESS OF BREATH: 0
EYE REDNESS: 0
NAUSEA: 1
RHINORRHEA: 0
TROUBLE SWALLOWING: 0
COUGH: 0
VOICE CHANGE: 0

## 2021-10-12 ASSESSMENT — PATIENT HEALTH QUESTIONNAIRE - PHQ9
SUM OF ALL RESPONSES TO PHQ9 QUESTIONS 1 & 2: 0
1. LITTLE INTEREST OR PLEASURE IN DOING THINGS: 0
SUM OF ALL RESPONSES TO PHQ QUESTIONS 1-9: 0
2. FEELING DOWN, DEPRESSED OR HOPELESS: 0

## 2021-10-12 NOTE — PATIENT INSTRUCTIONS
Vit D 5,000 daily. Stop all NSAIDs    Take tylenol only as needed for back pain. Monitor blood pressure. Begin pepcid 20mg twice daily for acid reflux. Take gaviscon otc as needed for breakthrough symptoms.

## 2021-10-12 NOTE — PROGRESS NOTES
[address] [facility]  [address] [place] [address]  [id number]  Abi Resendez [id number]  Dept: [phone]  Dept Fax: [phone]  Loc: [phone]        Anni Russell is a 64 y.o. female who presents today for her medical conditions/ complaints as noted below. Anni Russell is c/o 1 Month Follow-Up (Pt states you suggested prilosec and labs suggested she didn't take it. Wanted to know what she should do. )        Chief Complaint   Patient presents with    1 Month Follow-Up     Pt states you suggested prilosec and labs suggested she didn't take it. Wanted to know what she should do. HPI:     HPI    Pt here to establish with provider. Previously seen by Gisela HICKEY. GERD: reports out of control acid reflux. Notes that she has been having episodes of nausea that are on and off. Seizure Disorder:  Pt reports hx of seizures that have been well controlled on carbamazepine 200 mg twice daily. No issues. Pt is due for carbamazepine level to be drawn. Vit d def: hx of vit d def. Pt needs repeat vit d lab obtained. Boarderline HTN: b/p well controlled today. Denies any symptoms of elevated blood pressure such as chest pain, headache, or sob. Patient reports that they have been compliant with taking medications as directed.      Past Medical History:   Diagnosis Date    Chronic bilateral low back pain 10/16/2021    Class 2 severe obesity with serious comorbidity and body mass index (BMI) of 36.0 to 36.9 in adult [facility]) 10/16/2021    Gastroesophageal reflux disease 2021    Seizures ([facility] [address]        Past Surgical History:   Procedure Laterality Date     SECTION      COLONOSCOPY N/A 1/15/2019    Dr Shane Rocha, 5 yr recall       Social History     Socioeconomic History    Marital status:      Spouse name: None    Number of children: None    Years of education: None    Highest education level: None   Occupational History    None   Tobacco Use  Smoking status: Former Smoker     Packs/day: 1.00     Years: 15.00     Pack years: 15.00    Smokeless tobacco: Never Used   Vaping Use    Vaping Use: Never used   Substance and Sexual Activity    Alcohol use: No    Drug use: No    Sexual activity: Yes   Other Topics Concern    None   Social History Narrative    None     Social Determinants of Health     Financial Resource Strain: Low Risk     Difficulty of Paying Living Expenses: Not hard at all   Food Insecurity: No Food Insecurity    Worried About Running Out of Food in the Last Year: Never true    Javi of Food in the Last Year: Never true   Transportation Needs:     Lack of Transportation (Medical):      Lack of Transportation (Non-Medical):    Physical Activity:     Days of Exercise per Week:     Minutes of Exercise per Session:    Stress:     Feeling of Stress :    Social Connections:     Frequency of Communication with Friends and Family:     Frequency of Social Gatherings with Friends and Family:     Attends Mu-ism Services:     Active Member of Clubs or Organizations:     Attends Club or Organization Meetings:     Marital Status:    Intimate Partner Violence:     Fear of Current or Ex-Partner:     Emotionally Abused:     Physically Abused:     Sexually Abused:        Family History   Problem Relation Age of Onset    High Blood Pressure Mother     Heart Disease Mother     High Blood Pressure Father     Heart Disease Father     Colon Cancer Father     Diabetes Maternal Grandmother        Current Outpatient Medications   Medication Sig Dispense Refill    famotidine (PEPCID) 20 MG tablet Take 1 tablet by mouth 2 times daily 60 tablet 3    conjugated estrogens (PREMARIN) 0.625 MG/GM vaginal cream Place 0.5 g vaginally Twice a Week 1 Tube 1    carBAMazepine (CARBATROL) 100 MG extended release capsule Take 2 capsules by mouth 2 times daily 360 capsule 3    cetirizine (ZYRTEC ALLERGY) 10 MG tablet Take 1 tablet by mouth daily 30 tablet 0    ascorbic acid (VITAMIN C) 500 MG tablet Take 500 mg by mouth daily      calcium carbonate (OSCAL) 500 MG TABS tablet Take 500 mg by mouth daily      therapeutic multivitamin-minerals (THERAGRAN-M) tablet Take 1 tablet by mouth daily.  famotidine (PEPCID) 20 MG tablet Take 1 tablet by mouth 2 times daily 60 tablet 3     No current facility-administered medications for this visit. Allergies   Allergen Reactions    Augmentin [Amoxicillin-Pot Clavulanate] Diarrhea     Severe diarrhea       Lab Review not applicable  notapplicable    Subjective:   Review of Systems   Constitutional: Negative for activity change, appetite change, fatigue, fever and unexpected weight change. HENT: Negative for congestion, ear pain, nosebleeds, rhinorrhea, sore throat, trouble swallowing and voice change. Eyes: Negative for redness and visual disturbance. Respiratory: Negative for cough, chest tightness, shortness of breath and wheezing. Cardiovascular: Negative for chest pain, palpitations and leg swelling. Gastrointestinal: Positive for nausea. Negative for abdominal pain, blood in stool, constipation, diarrhea and vomiting. Endocrine: Negative for polydipsia, polyphagia and polyuria. Genitourinary: Negative for dysuria, frequency and urgency. Musculoskeletal: Negative for myalgias. Skin: Negative for rash and wound. Neurological: Negative for dizziness, speech difficulty, light-headedness and headaches. Psychiatric/Behavioral: Negative for agitation, confusion, self-injury and suicidal ideas. The patient is not nervous/anxious. Objective:     Physical Exam  Vitals and nursing note reviewed. Constitutional:       General: She is not in acute distress. Appearance: She is well-developed. She is not diaphoretic. HENT:      Head: Normocephalic and atraumatic.       Right Ear: External ear normal.      Left Ear: External ear normal.      Nose: Nose normal. Mouth/Throat:      Pharynx: No oropharyngeal exudate. Eyes:      General:         Right eye: No discharge. Left eye: No discharge. Conjunctiva/sclera: Conjunctivae normal.      Pupils: Pupils are equal, round, and reactive to light. Cardiovascular:      Rate and Rhythm: Normal rate and regular rhythm. Heart sounds: Normal heart sounds. No murmur heard. Pulmonary:      Effort: Pulmonary effort is normal. No respiratory distress. Breath sounds: Normal breath sounds. No stridor. No wheezing or rales. Chest:      Chest wall: No tenderness. Breasts:         Right: No inverted nipple, mass, nipple discharge, skin change or tenderness. Left: No inverted nipple, mass, nipple discharge, skin change or tenderness. Abdominal:      General: Bowel sounds are normal. There is no distension. Palpations: Abdomen is soft. Tenderness: There is no abdominal tenderness. Musculoskeletal:         General: No tenderness or deformity. Normal range of motion. Cervical back: Normal range of motion and neck supple. Skin:     General: Skin is warm and dry. Findings: No erythema or rash. Neurological:      Mental Status: She is alert and oriented to person, place, and time. Cranial Nerves: No cranial nerve deficit. Coordination: Coordination normal.      Deep Tendon Reflexes: Reflexes are normal and symmetric. Psychiatric:         Behavior: Behavior normal.         Thought Content: Thought content normal.       /84 (Site: Left Upper Arm, Position: Sitting)   Pulse 90   Temp 98.1 °F (36.7 °C) (Temporal)   Resp 18   Ht 5' 8\" (1.727 m)   Wt 243 lb (110.2 kg)   SpO2 98%   BMI 36.95 kg/m²     Assessment:      Diagnosis Orders   1. Gastroesophageal reflux disease, unspecified whether esophagitis present  famotidine (PEPCID) 20 MG tablet   2. Chronic bilateral low back pain, unspecified whether sciatica present     3.  Class 2 severe obesity with serious comorbidity and body mass index (BMI) of 36.0 to 36.9 in adult, unspecified obesity type ([facility] [address]     4. Creatinine elevation     5. Borderline hypertension       No results found for this visit on 10/12/21. Plan:     1. Gastroesophageal reflux disease, unspecified whether esophagitis present    2. Chronic bilateral low back pain, unspecified whether sciatica present    3. Class 2 severe obesity with serious comorbidity and body mass index (BMI) of 36.0 to 36.9 in adult, unspecified obesity type ([facility] [address]    4. Creatinine elevation    5. Borderline hypertension      Over 50% of the total visit time of 30 minutes was spent on counseling and or coordination of care of gerd, bilateral low back pain, class 2 severe obesity, elevated creat, and boarderline HTN. Return in about 1 month (around 11/12/2021). No orders of the defined types were placed in this encounter. Orders Placed This Encounter   Medications    famotidine (PEPCID) 20 MG tablet     Sig: Take 1 tablet by mouth 2 times daily     Dispense:  60 tablet     Refill:  3       Patient Instructions   Vit D 5,000 daily. Stop all NSAIDs    Take tylenol only as needed for back pain. Monitor blood pressure. Begin pepcid 20mg twice daily for acid reflux. Take gaviscon otc as needed for breakthrough symptoms. /family given educational materials - see patient instructions. Discussed use, benefit, and side effects of prescribed medications. All patient/family questions answered and voiced understanding. Instructed to continue current medications, diet and exercise. Pt/family agreed with treatment plan. Follow up as directed and sooner if needed. Patient/ family instructed that is symptoms worsen or persist they are to contact office or report to nearest ER.   They voice understanding and agreement with this plan.   signed by EDELMIRA Kirkland on 10/16/2021 at 9:06 AM CDT    This dictation was generated by voice recognition computer software. Although all attempts are made to edit the dictation for accuracy, there may be errors in the transcription that are not intended.

## 2021-10-13 DIAGNOSIS — K21.9 GASTROESOPHAGEAL REFLUX DISEASE, UNSPECIFIED WHETHER ESOPHAGITIS PRESENT: Primary | ICD-10-CM

## 2021-10-13 RX ORDER — FAMOTIDINE 20 MG/1
20 TABLET, FILM COATED ORAL 2 TIMES DAILY
Qty: 60 TABLET | Refills: 3 | Status: SHIPPED | OUTPATIENT
Start: 2021-10-13 | End: 2021-11-16 | Stop reason: ALTCHOICE

## 2021-10-16 PROBLEM — R79.89 CREATININE ELEVATION: Status: ACTIVE | Noted: 2021-10-16

## 2021-10-16 PROBLEM — G89.29 CHRONIC BILATERAL LOW BACK PAIN: Status: ACTIVE | Noted: 2021-10-16

## 2021-10-16 PROBLEM — M54.50 CHRONIC BILATERAL LOW BACK PAIN: Status: ACTIVE | Noted: 2021-10-16

## 2021-10-16 PROBLEM — E66.812 CLASS 2 SEVERE OBESITY WITH SERIOUS COMORBIDITY AND BODY MASS INDEX (BMI) OF 36.0 TO 36.9 IN ADULT: Status: ACTIVE | Noted: 2021-10-16

## 2021-10-16 PROBLEM — E66.01 CLASS 2 SEVERE OBESITY WITH SERIOUS COMORBIDITY AND BODY MASS INDEX (BMI) OF 36.0 TO 36.9 IN ADULT (HCC): Status: ACTIVE | Noted: 2021-10-16

## 2021-10-16 RX ORDER — FAMOTIDINE 20 MG/1
20 TABLET, FILM COATED ORAL 2 TIMES DAILY
Qty: 60 TABLET | Refills: 3 | Status: SHIPPED | OUTPATIENT
Start: 2021-10-16 | End: 2021-11-16 | Stop reason: ALTCHOICE

## 2021-11-12 ENCOUNTER — OFFICE VISIT (OUTPATIENT)
Dept: OBGYN CLINIC | Age: 56
End: 2021-11-12
Payer: COMMERCIAL

## 2021-11-12 VITALS
SYSTOLIC BLOOD PRESSURE: 145 MMHG | HEIGHT: 68 IN | HEART RATE: 84 BPM | DIASTOLIC BLOOD PRESSURE: 82 MMHG | BODY MASS INDEX: 36.53 KG/M2 | WEIGHT: 241 LBS

## 2021-11-12 DIAGNOSIS — R79.89 ABNORMAL BLOOD CREATININE LEVEL: ICD-10-CM

## 2021-11-12 DIAGNOSIS — N95.2 VAGINAL ATROPHY: ICD-10-CM

## 2021-11-12 DIAGNOSIS — Z01.419 WOMEN'S ANNUAL ROUTINE GYNECOLOGICAL EXAMINATION: Primary | ICD-10-CM

## 2021-11-12 DIAGNOSIS — Z12.4 SCREENING FOR CERVICAL CANCER: ICD-10-CM

## 2021-11-12 DIAGNOSIS — Z12.31 ENCOUNTER FOR SCREENING MAMMOGRAM FOR MALIGNANT NEOPLASM OF BREAST: ICD-10-CM

## 2021-11-12 LAB
ALBUMIN SERPL-MCNC: 4 G/DL (ref 3.5–5.2)
ALP BLD-CCNC: 90 U/L (ref 35–104)
ALT SERPL-CCNC: 13 U/L (ref 5–33)
ANION GAP SERPL CALCULATED.3IONS-SCNC: 10 MMOL/L (ref 7–19)
AST SERPL-CCNC: 16 U/L (ref 5–32)
BILIRUB SERPL-MCNC: 0.3 MG/DL (ref 0.2–1.2)
BUN BLDV-MCNC: 15 MG/DL (ref 6–20)
CALCIUM SERPL-MCNC: 9.3 MG/DL (ref 8.6–10)
CHLORIDE BLD-SCNC: 105 MMOL/L (ref 98–111)
CO2: 25 MMOL/L (ref 22–29)
CREAT SERPL-MCNC: 0.8 MG/DL (ref 0.5–0.9)
GFR AFRICAN AMERICAN: >59
GFR NON-AFRICAN AMERICAN: >60
GLUCOSE FASTING: 88 MG/DL (ref 74–109)
POTASSIUM SERPL-SCNC: 3.9 MMOL/L (ref 3.5–5)
SODIUM BLD-SCNC: 140 MMOL/L (ref 136–145)
TOTAL PROTEIN: 7.3 G/DL (ref 6.6–8.7)

## 2021-11-12 PROCEDURE — 99396 PREV VISIT EST AGE 40-64: CPT | Performed by: NURSE PRACTITIONER

## 2021-11-12 RX ORDER — CONJUGATED ESTROGENS 0.62 MG/G
0.5 CREAM VAGINAL
Qty: 1 EACH | Refills: 2 | Status: SHIPPED | OUTPATIENT
Start: 2021-11-15

## 2021-11-12 ASSESSMENT — ENCOUNTER SYMPTOMS
CONSTIPATION: 0
GASTROINTESTINAL NEGATIVE: 1
EYES NEGATIVE: 1
DIARRHEA: 0
ALLERGIC/IMMUNOLOGIC NEGATIVE: 1
RESPIRATORY NEGATIVE: 1

## 2021-11-12 NOTE — PATIENT INSTRUCTIONS
Patient Education        Well Visit, Women 48 to 72: Care Instructions  Overview     Well visits can help you stay healthy. Your doctor has checked your overall health and may have suggested ways to take good care of yourself. Your doctor also may have recommended tests. At home, you can help prevent illness with healthy eating, regular exercise, and other steps. Follow-up care is a key part of your treatment and safety. Be sure to make and go to all appointments, and call your doctor if you are having problems. It's also a good idea to know your test results and keep a list of the medicines you take. How can you care for yourself at home? · Get screening tests that you and your doctor decide on. Screening helps find diseases before any symptoms appear. · Eat healthy foods. Choose fruits, vegetables, whole grains, protein, and low-fat dairy foods. Limit fat, especially saturated fat. Reduce salt in your diet. · Limit alcohol. Have no more than 1 drink a day or 7 drinks a week. · Get at least 30 minutes of exercise on most days of the week. Walking is a good choice. You also may want to do other activities, such as running, swimming, cycling, or playing tennis or team sports. · Reach and stay at a healthy weight. This will lower your risk for many problems, such as obesity, diabetes, heart disease, and high blood pressure. · Do not smoke. Smoking can make health problems worse. If you need help quitting, talk to your doctor about stop-smoking programs and medicines. These can increase your chances of quitting for good. · Care for your mental health. It is easy to get weighed down by worry and stress. Learn strategies to manage stress, like deep breathing and mindfulness, and stay connected with your family and community. If you find you often feel sad or hopeless, talk with your doctor. Treatment can help.   · Talk to your doctor about whether you have any risk factors for sexually transmitted infections (STIs). You can help prevent STIs if you wait to have sex with a new partner (or partners) until you've each been tested for STIs. It also helps if you use condoms (male or female condoms) and if you limit your sex partners to one person who only has sex with you. Vaccines are available for some STIs. · If you think you may have a problem with alcohol or drug use, talk to your doctor. This includes prescription medicines (such as amphetamines and opioids) and illegal drugs (such as cocaine and methamphetamine). Your doctor can help you figure out what type of treatment is best for you. · Protect your skin from too much sun. When you're outdoors from 10 a.m. to 4 p.m., stay in the shade or cover up with clothing and a hat with a wide brim. Wear sunglasses that block UV rays. Even when it's cloudy, put broad-spectrum sunscreen (SPF 30 or higher) on any exposed skin. · See a dentist one or two times a year for checkups and to have your teeth cleaned. · Wear a seat belt in the car. When should you call for help? Watch closely for changes in your health, and be sure to contact your doctor if you have any problems or symptoms that concern you. Where can you learn more? Go to [email or web address][email or web address] org and sign in to your [employer] account. Enter Y720 in the [facility] box to learn more about \"Well Visit, Women 50 to 72: Care Instructions. \"     If you do not have an account, please click on the \"Sign Up Now\" link. Current as of: February 11, 2021               Content Version: 13.0  © 9551-0176 [employer]wise, Incorporated. Care instructions adapted under license by [facility] ([facility]). If you have questions about a medical condition or this instruction, always ask your healthcare professional. [place] [address] any warranty or liability for your use of this information.

## 2021-11-12 NOTE — PROGRESS NOTES
Caridad Jewell is a 64 y.o. female who presents today for her medical conditions/ complaints as noted below. Caridad Jewell is c/o of Gynecologic Exam        HPI   Pt presents for annual exam and pap smear. Had appt for screening mammogram today, but having to reschedule d/t recent Covid vaccine. Doing well with Premarin vaginal cream for dryness, only using very sparingly. Has f/u with Shruthi Jessi for elevated Creatinine, fasting today for repeat labs. Mammo:2020  Pap smear:  Contraception:NA    P:2  Ab:0  Bone density:NA   Colonoscopy:  Patient's last menstrual period was 2018 (within days).   [id number]    Past Medical History:   Diagnosis Date    Chronic bilateral low back pain 10/16/2021    Class 2 severe obesity with serious comorbidity and body mass index (BMI) of 36.0 to 36.9 in adult [facility]) 10/16/2021    Gastroesophageal reflux disease 2021    Seizures ([address]      Past Surgical History:   Procedure Laterality Date     SECTION      COLONOSCOPY N/A 1/15/2019    Dr Elvira Slaughter, 5 yr recall     Family History   Problem Relation Age of Onset    High Blood Pressure Mother     Heart Disease Mother     High Blood Pressure Father     Heart Disease Father     Colon Cancer Father     Diabetes Maternal Grandmother      Social History     Tobacco Use    Smoking status: Former Smoker     Packs/day: 1.00     Years: 15.00     Pack years: 15.00    Smokeless tobacco: Never Used   Substance Use Topics    Alcohol use: No       Current Outpatient Medications   Medication Sig Dispense Refill    [START ON 11/15/2021] conjugated estrogens (PREMARIN) 0.625 MG/GM vaginal cream Place 0.5 g vaginally Twice a Week 1 each 2    famotidine (PEPCID) 20 MG tablet Take 1 tablet by mouth 2 times daily 60 tablet 3    cetirizine (ZYRTEC ALLERGY) 10 MG tablet Take 1 tablet by mouth daily 30 tablet 0    ascorbic acid (VITAMIN C) 500 MG tablet Take 500 mg by mouth daily      calcium carbonate (OSCAL) 500 MG TABS tablet Take 500 mg by mouth daily      therapeutic multivitamin-minerals (THERAGRAN-M) tablet Take 1 tablet by mouth daily.  carBAMazepine (CARBATROL) 100 MG extended release capsule Take 2 capsules by mouth 2 times daily 360 capsule 3     No current facility-administered medications for this visit. Allergies   Allergen Reactions    Augmentin [Amoxicillin-Pot Clavulanate] Diarrhea     Severe diarrhea     Vitals:    11/12/21 0854   BP: (!) 145/82   Pulse: 84     Body mass index is 36.64 kg/m². Review of Systems   Constitutional: Negative. HENT: Negative. Eyes: Negative. Respiratory: Negative. Cardiovascular: Negative. Gastrointestinal: Negative. Negative for constipation and diarrhea. Endocrine: Negative. Genitourinary: Negative. Negative for frequency, menstrual problem and urgency. Musculoskeletal: Negative. Skin: Negative. Allergic/Immunologic: Negative. Neurological: Negative. Hematological: Negative. Psychiatric/Behavioral: Negative. All other systems reviewed and are negative. Physical Exam  Vitals and nursing note reviewed. Constitutional:       Appearance: She is well-developed. HENT:      Head: Normocephalic. Neck:      Thyroid: No thyroid mass or thyromegaly. Cardiovascular:      Rate and Rhythm: Normal rate and regular rhythm. Pulmonary:      Effort: Pulmonary effort is normal.      Breath sounds: Normal breath sounds. Chest:   Breasts:      Right: No inverted nipple, mass, nipple discharge or skin change. Left: No inverted nipple, mass, nipple discharge or skin change. Comments: Bilateral pendulous in nature  Abdominal:      Palpations: Abdomen is soft. There is no mass. Tenderness: There is no abdominal tenderness. Genitourinary:     General: Normal vulva. Vagina: Normal.      Cervix: No cervical motion tenderness. Uterus: Normal. Not enlarged.        Adnexa: Right: No mass or tenderness. Left: No mass or tenderness. Comments: Pap collected  Atrophic vaginal changes  Musculoskeletal:         General: Normal range of motion. Cervical back: Normal range of motion and neck supple. Skin:     General: Skin is warm and dry. Neurological:      Mental Status: She is alert and oriented to person, place, and time. Psychiatric:         Attention and Perception: Attention normal.         Mood and Affect: Mood normal.         Speech: Speech normal.         Behavior: Behavior normal.         Thought Content: Thought content normal.         Cognition and Memory: Cognition normal.         Judgment: Judgment normal.          Diagnosis Orders   1. Women's annual routine gynecological examination     2. Screening for cervical cancer  PAP SMEAR    Human papillomavirus (HPV) DNA probe thin prep high risk   3. Encounter for screening mammogram for malignant neoplasm of breast  GEOVANNA DIGITAL SCREEN W OR WO CAD BILATERAL   4. Vaginal atrophy         MEDICATIONS:  Orders Placed This Encounter   Medications    conjugated estrogens (PREMARIN) 0.625 MG/GM vaginal cream     Sig: Place 0.5 g vaginally Twice a Week     Dispense:  1 each     Refill:  2       ORDERS:  Orders Placed This Encounter   Procedures    GEOVANNA DIGITAL SCREEN W OR WO CAD BILATERAL    PAP SMEAR    Human papillomavirus (HPV) DNA probe thin prep high risk       PLAN:  Pap collected  Ordered screening mammogram  Refilled Premarin vaginal     There are no Patient Instructions on file for this visit.

## 2021-11-12 NOTE — PROGRESS NOTES
Pt presents today for pap smear and breast exam.    Mammo:2020  Pap smear:  Contraception:    P:2  Ab:0  Bone density:NA   Colonoscopy:2019

## 2021-11-16 ENCOUNTER — OFFICE VISIT (OUTPATIENT)
Dept: PRIMARY CARE CLINIC | Age: 56
End: 2021-11-16
Payer: COMMERCIAL

## 2021-11-16 VITALS
HEIGHT: 68 IN | WEIGHT: 245.4 LBS | DIASTOLIC BLOOD PRESSURE: 90 MMHG | SYSTOLIC BLOOD PRESSURE: 138 MMHG | HEART RATE: 68 BPM | BODY MASS INDEX: 37.19 KG/M2 | OXYGEN SATURATION: 97 % | TEMPERATURE: 98.4 F

## 2021-11-16 DIAGNOSIS — K21.9 GASTROESOPHAGEAL REFLUX DISEASE, UNSPECIFIED WHETHER ESOPHAGITIS PRESENT: Primary | ICD-10-CM

## 2021-11-16 DIAGNOSIS — R79.89 ABNORMAL BLOOD CREATININE LEVEL: ICD-10-CM

## 2021-11-16 DIAGNOSIS — G40.909 SEIZURE DISORDER (HCC): ICD-10-CM

## 2021-11-16 DIAGNOSIS — R03.0 BORDERLINE HYPERTENSION: ICD-10-CM

## 2021-11-16 DIAGNOSIS — E55.9 VITAMIN D DEFICIENCY: ICD-10-CM

## 2021-11-16 DIAGNOSIS — R79.89 CREATININE ELEVATION: ICD-10-CM

## 2021-11-16 PROCEDURE — 99214 OFFICE O/P EST MOD 30 MIN: CPT | Performed by: NURSE PRACTITIONER

## 2021-11-16 RX ORDER — PANTOPRAZOLE SODIUM 40 MG/1
40 TABLET, DELAYED RELEASE ORAL
Qty: 30 TABLET | Refills: 5 | Status: SHIPPED | OUTPATIENT
Start: 2021-11-16 | End: 2021-12-14 | Stop reason: SDUPTHER

## 2021-11-16 RX ORDER — METFORMIN HYDROCHLORIDE 500 MG/1
TABLET, EXTENDED RELEASE ORAL
Qty: 30 TABLET | Refills: 1 | Status: SHIPPED | OUTPATIENT
Start: 2021-11-16 | End: 2021-12-14 | Stop reason: SDUPTHER

## 2021-11-16 ASSESSMENT — ENCOUNTER SYMPTOMS
COUGH: 0
WHEEZING: 0
SORE THROAT: 0
BLOOD IN STOOL: 0
VOMITING: 0
NAUSEA: 1
SHORTNESS OF BREATH: 0
ABDOMINAL PAIN: 0
TROUBLE SWALLOWING: 0
VOICE CHANGE: 0
DIARRHEA: 0
CONSTIPATION: 0
RHINORRHEA: 0
CHEST TIGHTNESS: 0
EYE REDNESS: 0

## 2021-11-16 NOTE — PATIENT INSTRUCTIONS
Begin metformin 500 mg daily after food to prevent GI upset. Stop pepcid    Restart protonic 40 mg daily.

## 2021-11-16 NOTE — PROGRESS NOTES
[address] [facility]  [address] [place] [address]  [id number]  [phone]  Dept: [phone]  Dept Fax: [phone]  Loc: [phone]        Katie Patrick is a 64 y.o. female who presents today for her medical conditions/ complaints as noted below. Katie Patrick is c/o Gastroesophageal Reflux (1 month follow up; pt said it hits her worse at night when she takes her medication.)        Chief Complaint   Patient presents with    Gastroesophageal Reflux     1 month follow up; pt said it hits her worse at night when she takes her medication. HPI:     Gastroesophageal Reflux  She complains of nausea. She reports no abdominal pain, no chest pain, no coughing, no sore throat or no wheezing. Pertinent negatives include no fatigue. GERD: reports out of control acid reflux. Notes that she has been having episodes of nausea that are on and off. Wishes to return to protonix if possible. Seizure Disorder:  Pt reports hx of seizures that have been well controlled on carbamazepine 200 mg twice daily. No issues. Pt is due for carbamazepine level to be drawn. Vit d def: hx of vit d def. Pt needs repeat vit d lab obtained. Boarderline HTN:  Denies any symptoms of elevated blood pressure such as chest pain, headache, or sob. Patient reports that they have been compliant with taking medications as directed.      Past Medical History:   Diagnosis Date    Chronic bilateral low back pain 10/16/2021    Class 2 severe obesity with serious comorbidity and body mass index (BMI) of 36.0 to 36.9 in adult [facility]) 10/16/2021    Gastroesophageal reflux disease 2021    Seizures ([facility]        Past Surgical History:   Procedure Laterality Date     SECTION      COLONOSCOPY N/A 1/15/2019    Dr Primo Crawford, 5 yr recall       Social History     Socioeconomic History    Marital status:      Spouse name: None    Number of children: None    Years of education: None    Highest education level: None   Occupational History    None   Tobacco Use    Smoking status: Former Smoker     Packs/day: 1.00     Years: 15.00     Pack years: 15.00    Smokeless tobacco: Never Used   Vaping Use    Vaping Use: Never used   Substance and Sexual Activity    Alcohol use: No    Drug use: No    Sexual activity: Yes   Other Topics Concern    None   Social History Narrative    None     Social Determinants of Health     Financial Resource Strain: Low Risk     Difficulty of Paying Living Expenses: Not hard at all   Food Insecurity: No Food Insecurity    Worried About Running Out of Food in the Last Year: Never true    Javi of Food in the Last Year: Never true   Transportation Needs:     Lack of Transportation (Medical): Not on file    Lack of Transportation (Non-Medical):  Not on file   Physical Activity:     Days of Exercise per Week: Not on file    Minutes of Exercise per Session: Not on file   Stress:     Feeling of Stress : Not on file   Social Connections:     Frequency of Communication with Friends and Family: Not on file    Frequency of Social Gatherings with Friends and Family: Not on file    Attends Anglican Services: Not on file    Active Member of [address] [place] [address] [employer] or Organizations: Not on file    Attends Club or Organization Meetings: Not on file    Marital Status: Not on file   Intimate Partner Violence:     Fear of Current or Ex-Partner: Not on file    Emotionally Abused: Not on file    Physically Abused: Not on file    Sexually Abused: Not on file   Housing Stability:     Unable to Pay for Housing in the Last Year: Not on file    Number of [place] in the Last Year: Not on file    Unstable Housing in the Last Year: Not on file       Family History   Problem Relation Age of Onset    High Blood Pressure Mother     Heart Disease Mother     High Blood Pressure Father     Heart Disease Father     Colon Cancer Father     Diabetes Maternal Grandmother Current Outpatient Medications   Medication Sig Dispense Refill    VITAMIN D PO Take by mouth daily      Alum Hydroxide-Mag Carbonate (GAVISCON PO) Take by mouth nightly      pantoprazole (PROTONIX) 40 MG tablet Take 1 tablet by mouth every morning (before breakfast) 30 tablet 5    metFORMIN (GLUCOPHAGE-XR) 500 MG extended release tablet Take with largest meal of the day. 30 tablet 1    conjugated estrogens (PREMARIN) 0.625 MG/GM vaginal cream Place 0.5 g vaginally Twice a Week 1 each 2    carBAMazepine (CARBATROL) 100 MG extended release capsule Take 2 capsules by mouth 2 times daily 360 capsule 3    cetirizine (ZYRTEC ALLERGY) 10 MG tablet Take 1 tablet by mouth daily 30 tablet 0    ascorbic acid (VITAMIN C) 500 MG tablet Take 500 mg by mouth daily      calcium carbonate (OSCAL) 500 MG TABS tablet Take 500 mg by mouth daily      therapeutic multivitamin-minerals (THERAGRAN-M) tablet Take 1 tablet by mouth daily. No current facility-administered medications for this visit. Allergies   Allergen Reactions    Augmentin [Amoxicillin-Pot Clavulanate] Diarrhea     Severe diarrhea       Lab Review not applicable  notapplicable    Subjective:   Review of Systems   Constitutional: Negative for activity change, appetite change, fatigue, fever and unexpected weight change. HENT: Negative for congestion, ear pain, nosebleeds, rhinorrhea, sore throat, trouble swallowing and voice change. Eyes: Negative for redness and visual disturbance. Respiratory: Negative for cough, chest tightness, shortness of breath and wheezing. Cardiovascular: Negative for chest pain, palpitations and leg swelling. Gastrointestinal: Positive for nausea. Negative for abdominal pain, blood in stool, constipation, diarrhea and vomiting. Endocrine: Negative for polydipsia, polyphagia and polyuria. Genitourinary: Negative for dysuria, frequency and urgency. Musculoskeletal: Negative for myalgias.    Skin: Negative for rash and wound. Neurological: Negative for dizziness, speech difficulty, light-headedness and headaches. Psychiatric/Behavioral: Negative for agitation, confusion, self-injury and suicidal ideas. The patient is not nervous/anxious. Objective:     Physical Exam  Vitals and nursing note reviewed. Constitutional:       General: She is not in acute distress. Appearance: She is well-developed. She is not diaphoretic. HENT:      Head: Normocephalic and atraumatic. Right Ear: External ear normal.      Left Ear: External ear normal.      Nose: Nose normal.      Mouth/Throat:      Pharynx: No oropharyngeal exudate. Eyes:      General:         Right eye: No discharge. Left eye: No discharge. Conjunctiva/sclera: Conjunctivae normal.      Pupils: Pupils are equal, round, and reactive to light. Cardiovascular:      Rate and Rhythm: Normal rate and regular rhythm. Heart sounds: Normal heart sounds. No murmur heard. Pulmonary:      Effort: Pulmonary effort is normal. No respiratory distress. Breath sounds: Normal breath sounds. No stridor. No wheezing or rales. Chest:      Chest wall: No tenderness. Breasts:      Right: No inverted nipple, mass, nipple discharge, skin change or tenderness. Left: No inverted nipple, mass, nipple discharge, skin change or tenderness. Abdominal:      General: Bowel sounds are normal. There is no distension. Palpations: Abdomen is soft. Tenderness: There is no abdominal tenderness. Musculoskeletal:         General: No tenderness or deformity. Normal range of motion. Cervical back: Normal range of motion and neck supple. Skin:     General: Skin is warm and dry. Findings: No erythema or rash. Neurological:      Mental Status: She is alert and oriented to person, place, and time. Cranial Nerves: No cranial nerve deficit.       Coordination: Coordination normal.      Deep Tendon Reflexes: Reflexes are normal and symmetric. Psychiatric:         Behavior: Behavior normal.         Thought Content: Thought content normal.       BP (!) 138/90   Pulse 68   Temp 98.4 °F (36.9 °C) (Temporal)   Ht 5' 8\" (1.727 m)   Wt 245 lb 6.4 oz (111.3 kg)   LMP 01/01/2018 (Within Days)   SpO2 97%   BMI 37.31 kg/m²     Assessment:      Diagnosis Orders   1. Gastroesophageal reflux disease, unspecified whether esophagitis present  pantoprazole (PROTONIX) 40 MG tablet   2. BMI 37.0-37.9, adult  metFORMIN (GLUCOPHAGE-XR) 500 MG extended release tablet   3. Abnormal blood creatinine level  Comprehensive Metabolic Panel   4. Creatinine elevation     5. Borderline hypertension     6. Seizure disorder ([facility] [address]     7. Vitamin D deficiency       No results found for this visit on 11/16/21. Plan:     1. Gastroesophageal reflux disease, unspecified whether esophagitis present    2. BMI 37.0-37.9, adult    3. Abnormal blood creatinine level    4. Creatinine elevation    5. Borderline hypertension    6. [facility]    7. Vitamin D deficiency      Over 50% of the total visit time of 30 minutes was spent on counseling and or coordination of care of gerd, BMI, abnormal blood creat level, creat elevation, boarderline HTN, seizure disorder, vit d def. No follow-ups on file. Orders Placed This Encounter   Procedures    Comprehensive Metabolic Panel     Standing Status:   Future     Standing Expiration Date:   11/16/2022     Orders Placed This Encounter   Medications    pantoprazole (PROTONIX) 40 MG tablet     Sig: Take 1 tablet by mouth every morning (before breakfast)     Dispense:  30 tablet     Refill:  5    metFORMIN (GLUCOPHAGE-XR) 500 MG extended release tablet     Sig: Take with largest meal of the day. Dispense:  30 tablet     Refill:  1       Patient Instructions   Begin metformin 500 mg daily after food to prevent GI upset.      Stop pepcid    Restart protonic 40 mg daily.       /family given educational materials - see patient instructions. Discussed use, benefit, and side effects of prescribed medications. All patient/family questions answered and voiced understanding. Instructed to continue current medications, diet and exercise. Pt/family agreed with treatment plan. Follow up as directed and sooner if needed. Patient/ family instructed that is symptoms worsen or persist they are to contact office or report to nearest ER. They voice understanding and agreement with this plan.   signed by EDELMIRA Kirkland on 11/19/2021 at 9:06 AM CDT    This dictation was generated by voice recognition computer software. Although all attempts are made to edit the dictation for accuracy, there may be errors in the transcription that are not intended.

## 2021-11-19 ENCOUNTER — TELEPHONE (OUTPATIENT)
Dept: OBGYN CLINIC | Age: 56
End: 2021-11-19

## 2021-11-19 PROBLEM — E55.9 VITAMIN D DEFICIENCY: Status: ACTIVE | Noted: 2021-11-19

## 2021-11-19 NOTE — TELEPHONE ENCOUNTER
----- Message from EDELMIRA Cr CNP sent at 11/19/2021 12:57 PM CST -----  LGSIL- neg HPV.  Per guidelines, can repeat in 1 year (preferred) can also do colposcopy (acceptable.) Please let her know Thanks ML

## 2021-11-30 ENCOUNTER — OFFICE VISIT (OUTPATIENT)
Dept: NEUROLOGY | Age: 56
End: 2021-11-30
Payer: COMMERCIAL

## 2021-11-30 VITALS
RESPIRATION RATE: 16 BRPM | SYSTOLIC BLOOD PRESSURE: 135 MMHG | HEIGHT: 68 IN | HEART RATE: 72 BPM | DIASTOLIC BLOOD PRESSURE: 84 MMHG | BODY MASS INDEX: 35.61 KG/M2 | WEIGHT: 235 LBS

## 2021-11-30 DIAGNOSIS — G40.909 SEIZURE DISORDER (HCC): Primary | ICD-10-CM

## 2021-11-30 DIAGNOSIS — G40.909 SEIZURE DISORDER (HCC): ICD-10-CM

## 2021-11-30 DIAGNOSIS — R79.89 ABNORMAL BLOOD CREATININE LEVEL: ICD-10-CM

## 2021-11-30 LAB
ALBUMIN SERPL-MCNC: 3.7 G/DL (ref 3.5–5.2)
ALP BLD-CCNC: 83 U/L (ref 35–104)
ALT SERPL-CCNC: 12 U/L (ref 5–33)
ANION GAP SERPL CALCULATED.3IONS-SCNC: 11 MMOL/L (ref 7–19)
AST SERPL-CCNC: 13 U/L (ref 5–32)
BILIRUB SERPL-MCNC: 0.3 MG/DL (ref 0.2–1.2)
BUN BLDV-MCNC: 15 MG/DL (ref 6–20)
CALCIUM SERPL-MCNC: 9.4 MG/DL (ref 8.6–10)
CARBAMAZEPINE LEVEL: 3.5 UG/ML (ref 4–12)
CHLORIDE BLD-SCNC: 103 MMOL/L (ref 98–111)
CO2: 26 MMOL/L (ref 22–29)
CREAT SERPL-MCNC: 0.9 MG/DL (ref 0.5–0.9)
GFR AFRICAN AMERICAN: >59
GFR NON-AFRICAN AMERICAN: >60
GLUCOSE BLD-MCNC: 85 MG/DL (ref 74–109)
POTASSIUM SERPL-SCNC: 4.4 MMOL/L (ref 3.5–5)
SODIUM BLD-SCNC: 140 MMOL/L (ref 136–145)
TOTAL PROTEIN: 6.9 G/DL (ref 6.6–8.7)

## 2021-11-30 PROCEDURE — 99213 OFFICE O/P EST LOW 20 MIN: CPT | Performed by: PSYCHIATRY & NEUROLOGY

## 2021-11-30 RX ORDER — CARBAMAZEPINE 100 MG/1
200 CAPSULE, EXTENDED RELEASE ORAL 2 TIMES DAILY
Qty: 360 CAPSULE | Refills: 3 | Status: SHIPPED | OUTPATIENT
Start: 2021-11-30 | End: 2022-12-22

## 2021-11-30 NOTE — PROGRESS NOTES
[facility]  [address] [place] [address] Drive, [address]  [place], [place]  Phone [phone]  Fax [phone]     [facility] Follow Up Encounter  21 10:22 AM CST    Information:   Patient Name: Jade Carbajal  :   1965  Age:   64 y.o. MRN:   [id number]  Account #:  [de-identified]  Today:  21    Provider: Alise Maharaj M.D. Chief Complaint:   Chief Complaint   Patient presents with    Follow-up    Seizures       Subjective:   Jade Carbajal is a 64 y.o. woman with a history of seizures who is following up for her yearly visit. She remains on carbamazepine. She has had no seizures in years. In the past she did have seizures when we tried to get her off anticonvulsants years ago. She has had recent labs. She has had some back pain lately. Objective:     Past Medical History:  Past Medical History:   Diagnosis Date    Chronic bilateral low back pain 10/16/2021    Class 2 severe obesity with serious comorbidity and body mass index (BMI) of 36.0 to 36.9 in adult [facility]) 10/16/2021    Gastroesophageal reflux disease 2021    Seizures ([facility] [address]        Past Surgical History:   Procedure Laterality Date     SECTION      COLONOSCOPY N/A 1/15/2019    Dr Ariana Grijalva, 5 yr recall       Recent Hospitalizations  · None    Significant Injuries  · None    Habits  Jade Carbajal reports that she has quit smoking. She has a 15.00 pack-year smoking history. She has never used smokeless tobacco. She reports that she does not drink alcohol and does not use drugs. 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     Medications:  Current Outpatient Medications   Medication Sig Dispense Refill    VITAMIN D PO Take by mouth daily      Alum Hydroxide-Mag Carbonate (GAVISCON PO) Take by mouth nightly      pantoprazole (PROTONIX) 40 MG tablet Take 1 tablet by mouth every morning (before breakfast) 30 tablet 5    metFORMIN (GLUCOPHAGE-XR) 500 MG extended release tablet Take with largest meal of the day. 30 tablet 1    conjugated estrogens (PREMARIN) 0.625 MG/GM vaginal cream Place 0.5 g vaginally Twice a Week 1 each 2    carBAMazepine (CARBATROL) 100 MG extended release capsule Take 2 capsules by mouth 2 times daily 360 capsule 3    cetirizine (ZYRTEC ALLERGY) 10 MG tablet Take 1 tablet by mouth daily 30 tablet 0    ascorbic acid (VITAMIN C) 500 MG tablet Take 500 mg by mouth daily      calcium carbonate (OSCAL) 500 MG TABS tablet Take 500 mg by mouth daily      therapeutic multivitamin-minerals (THERAGRAN-M) tablet Take 1 tablet by mouth daily. No current facility-administered medications for this visit. Allergies:   Allergies as of 11/30/2021 - Fully Reviewed 11/30/2021   Allergen Reaction Noted    Augmentin [amoxicillin-pot clavulanate] Diarrhea 07/30/2018       Review of Systems:  Constitutional: negative for - chills and fever  Eyes:  negative for - visual disturbance and photophobia  HENMT: negative for - headaches and sinus pain  Respiratory: negative for - cough, hemoptysis, and shortness of breath  Cardiovascular: negative for - chest pain and palpitations  Gastrointestinal: negative for - blood in stools, constipation, diarrhea, nausea, and vomiting  Genito-Urinary: negative for - hematuria, urinary frequency, urinary urgency, and urinary retention  Musculoskeletal: positive for - joint pain, joint stiffness, and joint swelling  Hematological and Lymphatic: negative for - bleeding problems, abnormal bruising, and swollen lymph nodes  Endocrine:  negative for - polydipsia and polyphagia  Allergy/Immunology:  negative for - rhinorrhea, sinus congestion, hives  Integument:  negative for - negative for Lee's signs are absent bilaterally. There is no ankle clonus on either side. Sensation:  Sensation is intact to light touch, temperature, and vibration in all extremities. Coordination:  Rapid alternating movements are normal in both upper and lower extremities. Finger to nose testing is unimpaired bilaterally. Gait:  Normal station and gait. Pertinent Diagnostic Studies:  Labs were OK. Assessment:       ICD-10-CM    1. Se[facility] ([facility]  G40.909    Clinically stable and seizure free. Plan:   1. Tegretol level with next labs  2. Continue Carbatrol 200 mg BID  3. Call with any seizure  4.  FU in a year    Electronically signed by April Lester MD on 11/30/21

## 2021-12-01 ENCOUNTER — TELEPHONE (OUTPATIENT)
Dept: NEUROLOGY | Age: 56
End: 2021-12-01

## 2021-12-01 NOTE — TELEPHONE ENCOUNTER
Left message with lab results. Instructed that Dr Catrina Chen will not be changing the dose of the tegretol unless you have a breakthrough seizure. Call office with any questions or problems.

## 2021-12-01 NOTE — TELEPHONE ENCOUNTER
----- Message from Josh Cam MD sent at 11/30/2021 10:53 PM CST -----  Tegretol level was 3.5. Therapeutic level is 4 to 12. Close enough. I do not anticipate changing dose unless you have a breakthrough seizure since this dose has been effective (even with lower levels) for years.

## 2021-12-02 ENCOUNTER — OFFICE VISIT (OUTPATIENT)
Dept: PRIMARY CARE CLINIC | Age: 56
End: 2021-12-02
Payer: COMMERCIAL

## 2021-12-02 VITALS
HEIGHT: 68 IN | DIASTOLIC BLOOD PRESSURE: 78 MMHG | TEMPERATURE: 98.9 F | WEIGHT: 235 LBS | SYSTOLIC BLOOD PRESSURE: 130 MMHG | BODY MASS INDEX: 35.61 KG/M2 | OXYGEN SATURATION: 97 % | RESPIRATION RATE: 20 BRPM | HEART RATE: 100 BPM

## 2021-12-02 DIAGNOSIS — R05.9 COUGH: ICD-10-CM

## 2021-12-02 DIAGNOSIS — J01.10 ACUTE NON-RECURRENT FRONTAL SINUSITIS: Primary | ICD-10-CM

## 2021-12-02 PROCEDURE — 99213 OFFICE O/P EST LOW 20 MIN: CPT | Performed by: NURSE PRACTITIONER

## 2021-12-02 RX ORDER — AZITHROMYCIN 250 MG/1
250 TABLET, FILM COATED ORAL SEE ADMIN INSTRUCTIONS
Qty: 6 TABLET | Refills: 0 | Status: SHIPPED | OUTPATIENT
Start: 2021-12-02 | End: 2021-12-07

## 2021-12-02 RX ORDER — METHYLPREDNISOLONE 4 MG/1
TABLET ORAL
Qty: 21 TABLET | Refills: 0 | Status: SHIPPED | OUTPATIENT
Start: 2021-12-02 | End: 2021-12-08

## 2021-12-02 ASSESSMENT — ENCOUNTER SYMPTOMS
NAUSEA: 0
EYE DISCHARGE: 0
WHEEZING: 0
VOMITING: 0
EYE ITCHING: 0
TROUBLE SWALLOWING: 0
ABDOMINAL DISTENTION: 0
EYE REDNESS: 0
SINUS PRESSURE: 1
COUGH: 1
VOICE CHANGE: 0
SORE THROAT: 0
RHINORRHEA: 1
SHORTNESS OF BREATH: 0
DIARRHEA: 0

## 2021-12-02 NOTE — PROGRESS NOTES
[facility] [address] [facility]  [address] [address]  Dept: [phone]  Dept Fax: [phone]: [phone]    Baudilio Hogan is a 64 y.o. female who presents today for her medical conditions/complaintsas noted below. Baudilio Hogan is c/o of Facial Pain (Started Saturday) and Cough (Started Saturday)        HPI:     Cough  This is a new problem. The current episode started in the past 7 days. The problem has been gradually worsening. Cough characteristics: semi productive. Associated symptoms include nasal congestion, postnasal drip and rhinorrhea. Pertinent negatives include no ear pain, eye redness, fever, myalgias, rash, sore throat, shortness of breath or wheezing. The symptoms are aggravated by lying down. She has tried OTC cough suppressant for the symptoms. The treatment provided mild relief.        Past Medical History:   Diagnosis Date    Chronic bilateral low back pain 10/16/2021    Class 2 severe obesity with serious comorbidity and body mass index (BMI) of 36.0 to 36.9 in adult [facility]) 10/16/2021    Gastroesophageal reflux disease 2021    Seizures ([facility] [address]      Past Surgical History:   Procedure Laterality Date     SECTION      COLONOSCOPY N/A 1/15/2019    Dr Ulises Son, 5 yr recall       Family History   Problem Relation Age of Onset    High Blood Pressure Mother     Heart Disease Mother     High Blood Pressure Father     Heart Disease Father     Colon Cancer Father     Diabetes Maternal Grandmother        Social History     Tobacco Use    Smoking status: Former Smoker     Packs/day: 1.00     Years: 15.00     Pack years: 15.00    Smokeless tobacco: Never Used   Substance Use Topics    Alcohol use: No      Current Outpatient Medications   Medication Sig Dispense Refill    azithromycin (ZITHROMAX) 250 MG tablet Take 1 tablet by mouth See Admin Instructions for 5 drip, rhinorrhea and sinus pressure (frontal). Negative for ear discharge, ear pain, sore throat, trouble swallowing and voice change. Eyes: Negative for discharge, redness and itching. Respiratory: Positive for cough. Negative for shortness of breath and wheezing. Gastrointestinal: Negative for abdominal distention, diarrhea, nausea and vomiting. Musculoskeletal: Negative for myalgias. Skin: Negative for rash.       :Objective      Physical Exam  Constitutional:       Appearance: Normal appearance. She is normal weight. HENT:      Head: Normocephalic and atraumatic. Nose: Congestion present. Mouth/Throat:      Mouth: Mucous membranes are moist.      Pharynx: Oropharynx is clear. Posterior oropharyngeal erythema (mild) present. Eyes:      Extraocular Movements: Extraocular movements intact. Conjunctiva/sclera: Conjunctivae normal.      Pupils: Pupils are equal, round, and reactive to light. Cardiovascular:      Rate and Rhythm: Normal rate and regular rhythm. Pulses: Normal pulses. Heart sounds: Normal heart sounds. Pulmonary:      Effort: Pulmonary effort is normal.      Breath sounds: Normal breath sounds. No wheezing. Musculoskeletal:      Cervical back: Normal range of motion and neck supple. Lymphadenopathy:      Cervical: No cervical adenopathy. Skin:     General: Skin is warm and dry. Findings: No rash. Neurological:      General: No focal deficit present. Mental Status: She is alert and oriented to person, place, and time. Mental status is at baseline. Psychiatric:         Mood and Affect: Mood normal.         Behavior: Behavior normal.         Thought Content:  Thought content normal.         Judgment: Judgment normal.       /78 (Site: Left Upper Arm, Position: Sitting, Cuff Size: Large Adult)   Pulse 100   Temp 98.9 °F (37.2 °C) (Oral)   Resp 20   Ht 5' 8\" (1.727 m)   Wt 235 lb (106.6 kg)   LMP 01/01/2018 (Within Days)   SpO2 97% BMI 35.73 kg/m²     :Assessment       Diagnosis Orders   1. Acute non-recurrent frontal sinusitis  azithromycin (ZITHROMAX) 250 MG tablet    methylPREDNISolone (MEDROL DOSEPACK) 4 MG tablet   2. Cough         :Plan    No orders of the defined types were placed in this encounter. Return if symptoms worsen or fail to improve. Orders Placed This Encounter   Medications    azithromycin (ZITHROMAX) 250 MG tablet     Sig: Take 1 tablet by mouth See Admin Instructions for 5 days 500mg on day 1 followed by 250mg on days 2 - 5     Dispense:  6 tablet     Refill:  0    methylPREDNISolone (MEDROL DOSEPACK) 4 MG tablet     Sig: Take by mouth. Dispense:  21 tablet     Refill:  0       Patient given educational materials- see patient instructions. Discussed use, benefit, and side effects of prescribedmedications. All patient questions answered. Pt voiced understanding. Patient Instructions     1. Follow employer covid protocol as discussed, [facility] will follow you on this. Advise quarantine until results return or [facility] clears per employer policy. 2. Advise symptomatic treatment with otc meds such as mucinex, Flonase, zyrtec of other antihistamine. Tylenol and ibuprofen if fever, body aches, or pain develops. 3. Get script for zpack and medrol filled and start today. 4. Monitor for new or worsening symptoms and follow up for recheck as needed. Patient Education        Sinusitis: Care Instructions  Your Care Instructions     Sinusitis is an infection of the lining of the sinus cavities in your head. Sinusitis often follows a cold. It causes pain and pressure in your head and face. In most cases, sinusitis gets better on its own in 1 to 2 weeks. But some mild symptoms may last for several weeks. Sometimes antibiotics are needed. Follow-up care is a key part of your treatment and safety. Be sure to make and go to all appointments, and call your doctor if you are having problems.  It's also a good idea to know your test results and keep a list of the medicines you take. How can you care for yourself at home? · Take an over-the-counter pain medicine, such as acetaminophen (Tylenol), ibuprofen (Advil, Motrin), or naproxen (Aleve). Read and follow all instructions on the label. · If the doctor prescribed antibiotics, take them as directed. Do not stop taking them just because you feel better. You need to take the full course of antibiotics. · Be careful when taking over-the-counter cold or flu medicines and Tylenol at the same time. Many of these medicines have acetaminophen, which is Tylenol. Read the labels to make sure that you are not taking more than the recommended dose. Too much acetaminophen (Tylenol) can be harmful. · Breathe warm, moist air from a steamy shower, a hot bath, or a sink filled with hot water. Avoid cold, dry air. Using a humidifier in your home may help. Follow the directions for cleaning the machine. · Use saline (saltwater) nasal washes. This can help keep your nasal passages open and wash out mucus and bacteria. You can buy saline nose drops at a grocery store or drugstore. Or you can make your own at home by adding 1 teaspoon of salt and 1 teaspoon of baking soda to 2 cups of distilled water. If you make your own, fill a bulb syringe with the solution, insert the tip into your nostril, and squeeze gently. [place] [employer] your nose. · Put a hot, wet towel or a warm gel pack on your face 3 or 4 times a day for 5 to 10 minutes each time. · Try a decongestant nasal spray like oxymetazoline (Afrin). Do not use it for more than 3 days in a row. Using it for more than 3 days can make your congestion worse. When should you call for help? Call your doctor now or seek immediate medical care if:    · You have new or worse swelling or redness in your face or around your eyes.     · You have a new or higher fever.    Watch closely for changes in your health, and be sure to contact your doctor if:    · You have new or worse facial pain.     · The mucus from your nose becomes thicker (like pus) or has new blood in it.     · You are not getting better as expected. Where can you learn more? Go to https://SeamBLiSSpepiceweb.RedDrummer. org and sign in to your TraveDoc account. Enter U099 in the [facility] box to learn more about \"Sinusitis: Care Instructions. \"     If you do not have an account, please click on the \"Sign Up Now\" link. Current as of: December 2, 2020               Content Version: 13.0  © 8267-9828 Healthwise, Incorporated. Care instructions adapted under license by [facility] ([facility]). If you have questions about a medical condition or this instruction, always ask your healthcare professional. [address] any warranty or liability for your use of this information.                Electronically signed by EDELMIRA Jordan NP on 12/2/2021 at 12:25 PM

## 2021-12-02 NOTE — PATIENT INSTRUCTIONS
1. Follow employer covid protocol as discussed, [facility] will follow you on this. Advise quarantine until results return or [facility] clears per employer policy. 2. Advise symptomatic treatment with otc meds such as mucinex, Flonase, zyrtec of other antihistamine. Tylenol and ibuprofen if fever, body aches, or pain develops. 3. Get script for zpack and medrol filled and start today. 4. Monitor for new or worsening symptoms and follow up for recheck as needed. Patient Education        Sinusitis: Care Instructions  Your Care Instructions     Sinusitis is an infection of the lining of the sinus cavities in your head. Sinusitis often follows a cold. It causes pain and pressure in your head and face. In most cases, sinusitis gets better on its own in 1 to 2 weeks. But some mild symptoms may last for several weeks. Sometimes antibiotics are needed. Follow-up care is a key part of your treatment and safety. Be sure to make and go to all appointments, and call your doctor if you are having problems. It's also a good idea to know your test results and keep a list of the medicines you take. How can you care for yourself at home? · Take an over-the-counter pain medicine, such as acetaminophen (Tylenol), ibuprofen (Advil, Motrin), or naproxen (Aleve). Read and follow all instructions on the label. · If the doctor prescribed antibiotics, take them as directed. Do not stop taking them just because you feel better. You need to take the full course of antibiotics. · Be careful when taking over-the-counter cold or flu medicines and Tylenol at the same time. Many of these medicines have acetaminophen, which is Tylenol. Read the labels to make sure that you are not taking more than the recommended dose. Too much acetaminophen (Tylenol) can be harmful. · Breathe warm, moist air from a steamy shower, a hot bath, or a sink filled with hot water. Avoid cold, dry air. Using a humidifier in your home may help.  Follow the directions for cleaning the machine. · Use saline (saltwater) nasal washes. This can help keep your nasal passages open and wash out mucus and bacteria. You can buy saline nose drops at a grocery store or drugstore. Or you can make your own at home by adding 1 teaspoon of salt and 1 teaspoon of baking soda to 2 cups of distilled water. If you make your own, fill a bulb syringe with the solution, insert the tip into your nostril, and squeeze gently. [place] Renfrew your nose. · Put a hot, wet towel or a warm gel pack on your face 3 or 4 times a day for 5 to 10 minutes each time. · Try a decongestant nasal spray like oxymetazoline (Afrin). Do not use it for more than 3 days in a row. Using it for more than 3 days can make your congestion worse. When should you call for help? Call your doctor now or seek immediate medical care if:    · You have new or worse swelling or redness in your face or around your eyes.     · You have a new or higher fever. Watch closely for changes in your health, and be sure to contact your doctor if:    · You have new or worse facial pain.     · The mucus from your nose becomes thicker (like pus) or has new blood in it.     · You are not getting better as expected. Where can you learn more? Go to https://ClydeTec SystemspeiKure Techsoft.Swipesense. org and sign in to your QuikCycle account. Enter T650 in the [facility] box to learn more about \"Sinusitis: Care Instructions. \"     If you do not have an account, please click on the \"Sign Up Now\" link. Current as of: December 2, 2020               Content Version: 13.0  © 1118-8014 Healthwise, Incorporated. Care instructions adapted under license by [facility] ([facility]). If you have questions about a medical condition or this instruction, always ask your healthcare professional. [address] any warranty or liability for your use of this information.

## 2021-12-10 ENCOUNTER — HOSPITAL ENCOUNTER (OUTPATIENT)
Dept: WOMENS IMAGING | Age: 56
Discharge: HOME | End: 2021-12-10
Payer: COMMERCIAL

## 2021-12-10 DIAGNOSIS — Z12.31 ENCOUNTER FOR SCREENING MAMMOGRAM FOR MALIGNANT NEOPLASM OF BREAST: ICD-10-CM

## 2021-12-10 PROCEDURE — 77063 BREAST TOMOSYNTHESIS BI: CPT

## 2021-12-14 ENCOUNTER — OFFICE VISIT (OUTPATIENT)
Dept: PRIMARY CARE CLINIC | Age: 56
End: 2021-12-14
Payer: COMMERCIAL

## 2021-12-14 VITALS
HEART RATE: 80 BPM | HEIGHT: 68 IN | OXYGEN SATURATION: 98 % | SYSTOLIC BLOOD PRESSURE: 142 MMHG | TEMPERATURE: 98.2 F | WEIGHT: 240.8 LBS | DIASTOLIC BLOOD PRESSURE: 88 MMHG | BODY MASS INDEX: 36.49 KG/M2

## 2021-12-14 DIAGNOSIS — E66.01 CLASS 2 SEVERE OBESITY WITH SERIOUS COMORBIDITY AND BODY MASS INDEX (BMI) OF 36.0 TO 36.9 IN ADULT, UNSPECIFIED OBESITY TYPE (HCC): ICD-10-CM

## 2021-12-14 DIAGNOSIS — K21.9 GASTROESOPHAGEAL REFLUX DISEASE, UNSPECIFIED WHETHER ESOPHAGITIS PRESENT: Primary | ICD-10-CM

## 2021-12-14 PROCEDURE — 99213 OFFICE O/P EST LOW 20 MIN: CPT | Performed by: NURSE PRACTITIONER

## 2021-12-14 RX ORDER — METFORMIN HYDROCHLORIDE 500 MG/1
TABLET, EXTENDED RELEASE ORAL
Qty: 30 TABLET | Refills: 5 | Status: SHIPPED | OUTPATIENT
Start: 2021-12-14 | End: 2022-01-12

## 2021-12-14 RX ORDER — PANTOPRAZOLE SODIUM 40 MG/1
40 TABLET, DELAYED RELEASE ORAL
Qty: 30 TABLET | Refills: 5 | Status: SHIPPED | OUTPATIENT
Start: 2021-12-14 | End: 2022-08-28

## 2021-12-14 ASSESSMENT — ENCOUNTER SYMPTOMS
BLOOD IN STOOL: 0
CHEST TIGHTNESS: 0
WHEEZING: 0
VOICE CHANGE: 0
EYE REDNESS: 0
VOMITING: 0
CONSTIPATION: 0
ABDOMINAL PAIN: 0
SORE THROAT: 0
TROUBLE SWALLOWING: 0
NAUSEA: 0
COUGH: 0
SHORTNESS OF BREATH: 0
DIARRHEA: 0
RHINORRHEA: 0

## 2021-12-14 NOTE — PROGRESS NOTES
[facility]  [address] [place] [address]  [id number]  [place] [address]  Dept: [phone]  Dept Fax: [phone]  Loc: [phone]        Kimberly Simental is a 64 y.o. female who presents today for her medical conditions/ complaints as noted below. Kimberly Simental is c/o Gastroesophageal Reflux (pt reports her reflux is getting better)        Chief Complaint   Patient presents with    Gastroesophageal Reflux     pt reports her reflux is getting better       HPI:     Gastroesophageal Reflux  She reports no abdominal pain, no chest pain, no coughing, no nausea, no sore throat or no wheezing. Pertinent negatives include no fatigue. GERD:   2021: much improved with protonix per pt report. Notes that nausea has resolved. Seizure Disorder:  2021: stable. hx of seizures that have been well controlled on carbamazepine 200 mg twice daily. No issues. Pt is due for carbamazepine level to be drawn. Patient reports that they have been compliant with taking medications as directed.      Past Medical History:   Diagnosis Date    Chronic bilateral low back pain 10/16/2021    Class 2 severe obesity with serious comorbidity and body mass index (BMI) of 36.0 to 36.9 in adult [facility]) 10/16/2021    Gastroesophageal reflux disease 2021    Seizures ([facility] [address]        Past Surgical History:   Procedure Laterality Date     SECTION      COLONOSCOPY N/A 1/15/2019    Dr Mojgan Ceron, 5 yr recall       Social History     Socioeconomic History    Marital status:      Spouse name: None    Number of children: None    Years of education: None    Highest education level: None   Occupational History    None   Tobacco Use    Smoking status: Former Smoker     Packs/day: 1.00     Years: 15.00     Pack years: 15.00    Smokeless tobacco: Never Used   Vaping Use    Vaping Use: Never used   Substance and Sexual Activity    Alcohol use: No    Drug use: No    Sexual activity: Yes   Other Topics Concern    None   Social History Narrative    None     Social Determinants of Health     Financial Resource Strain: Low Risk     Difficulty of Paying Living Expenses: Not hard at all   Food Insecurity: No Food Insecurity    Worried About Running Out of Food in the Last Year: Never true    Javi of Food in the Last Year: Never true   Transportation Needs:     Lack of Transportation (Medical): Not on file    Lack of Transportation (Non-Medical): Not on file   Physical Activity:     Days of Exercise per Week: Not on file    Minutes of Exercise per Session: Not on file   Stress:     Feeling of Stress : Not on file   Social Connections:     Frequency of Communication with Friends and Family: Not on file    Frequency of Social Gatherings with Friends and Family: Not on file    Attends [employer] Services: Not on file    Active Member of [address] [place] [address] [employer] or Organizations: Not on file    Attends Club or Organization Meetings: Not on file    Marital Status: Not on file   Intimate Partner Violence:     Fear of Current or Ex-Partner: Not on file    Emotionally Abused: Not on file    Physically Abused: Not on file    Sexually Abused: Not on file   Housing Stability:     Unable to Pay for Housing in the Last Year: Not on file    Number of [place] in the Last Year: Not on file    Unstable Housing in the Last Year: Not on file       Family History   Problem Relation Age of Onset    High Blood Pressure Mother     Heart Disease Mother     High Blood Pressure Father     Heart Disease Father     Colon Cancer Father     Diabetes Maternal Grandmother        Current Outpatient Medications   Medication Sig Dispense Refill    pantoprazole (PROTONIX) 40 MG tablet Take 1 tablet by mouth every morning (before breakfast) 30 tablet 5    metFORMIN (GLUCOPHAGE-XR) 500 MG extended release tablet Take with largest meal of the day.  30 tablet 5    carBAMazepine (CARBATROL) 100 MG extended release capsule Take 2 capsules by mouth 2 times daily 360 capsule 3    VITAMIN D PO Take by mouth daily      Alum Hydroxide-Mag Carbonate (GAVISCON PO) Take by mouth nightly      conjugated estrogens (PREMARIN) 0.625 MG/GM vaginal cream Place 0.5 g vaginally Twice a Week 1 each 2    cetirizine (ZYRTEC ALLERGY) 10 MG tablet Take 1 tablet by mouth daily 30 tablet 0    ascorbic acid (VITAMIN C) 500 MG tablet Take 500 mg by mouth daily      calcium carbonate (OSCAL) 500 MG TABS tablet Take 500 mg by mouth daily      therapeutic multivitamin-minerals (THERAGRAN-M) tablet Take 1 tablet by mouth daily. No current facility-administered medications for this visit. Allergies   Allergen Reactions    Augmentin [Amoxicillin-Pot Clavulanate] Diarrhea     Severe diarrhea       Lab Review not applicable  notapplicable    Subjective:   Review of Systems   Constitutional: Negative for activity change, appetite change, fatigue, fever and unexpected weight change. HENT: Negative for congestion, ear pain, nosebleeds, rhinorrhea, sore throat, trouble swallowing and voice change. Eyes: Negative for redness and visual disturbance. Respiratory: Negative for cough, chest tightness, shortness of breath and wheezing. Cardiovascular: Negative for chest pain, palpitations and leg swelling. Gastrointestinal: Negative for abdominal pain, blood in stool, constipation, diarrhea, nausea and vomiting. Endocrine: Negative for polydipsia, polyphagia and polyuria. Genitourinary: Negative for dysuria, frequency and urgency. Musculoskeletal: Negative for myalgias. Skin: Negative for rash and wound. Neurological: Negative for dizziness, speech difficulty, light-headedness and headaches. Psychiatric/Behavioral: Negative for agitation, confusion, self-injury and suicidal ideas. The patient is not nervous/anxious. Objective:     Physical Exam  Vitals and nursing note reviewed.    Constitutional: General: She is not in acute distress. Appearance: She is well-developed. She is not diaphoretic. HENT:      Head: Normocephalic and atraumatic. Right Ear: External ear normal.      Left Ear: External ear normal.      Nose: Nose normal.      Mouth/Throat:      Pharynx: No oropharyngeal exudate. Eyes:      General:         Right eye: No discharge. Left eye: No discharge. Conjunctiva/sclera: Conjunctivae normal.      Pupils: Pupils are equal, round, and reactive to light. Cardiovascular:      Rate and Rhythm: Normal rate and regular rhythm. Heart sounds: Normal heart sounds. No murmur heard. Pulmonary:      Effort: Pulmonary effort is normal. No respiratory distress. Breath sounds: Normal breath sounds. No stridor. No wheezing or rales. Chest:      Chest wall: No tenderness. Breasts:      Right: No inverted nipple, mass, nipple discharge, skin change or tenderness. Left: No inverted nipple, mass, nipple discharge, skin change or tenderness. Abdominal:      General: Bowel sounds are normal. There is no distension. Palpations: Abdomen is soft. Tenderness: There is no abdominal tenderness. Musculoskeletal:         General: No tenderness or deformity. Normal range of motion. Cervical back: Normal range of motion and neck supple. Skin:     General: Skin is warm and dry. Findings: No erythema or rash. Neurological:      Mental Status: She is alert and oriented to person, place, and time. Cranial Nerves: No cranial nerve deficit. Coordination: Coordination normal.      Deep Tendon Reflexes: Reflexes are normal and symmetric. Psychiatric:         Behavior: Behavior normal.         Thought Content:  Thought content normal.       BP (!) 142/88   Pulse 80   Temp 98.2 °F (36.8 °C) (Temporal)   Ht 5' 8\" (1.727 m)   Wt 240 lb 12.8 oz (109.2 kg)   LMP 01/01/2018 (Within Days)   SpO2 98%   BMI 36.61 kg/m²     Assessment: Diagnosis Orders   1. Gastroesophageal reflux disease, unspecified whether esophagitis present  pantoprazole (PROTONIX) 40 MG tablet   2. BMI 37.0-37.9, adult  metFORMIN (GLUCOPHAGE-XR) 500 MG extended release tablet   3. Class 2 severe obesity with serious comorbidity and body mass index (BMI) of 36.0 to 36.9 in adult, unspecified obesity type ([facility] [address]       No results found for this visit on 12/14/21. Plan:     1. Gastroesophageal reflux disease, unspecified whether esophagitis present    2. BMI 37.0-37.9, adult    3. Class 2 severe obesity with serious comorbidity and body mass index (BMI) of 36.0 to 36.9 in adult, unspecified obesity type ([facility] [address]      Over 50% of the total visit time of 30 minutes was spent on counseling and or coordination of care of gerd, BMI, abnormal blood creat level, creat elevation, boarderline HTN, seizure disorder, vit d def. Return in about 6 months (around 6/14/2022). No orders of the defined types were placed in this encounter. Orders Placed This Encounter   Medications    pantoprazole (PROTONIX) 40 MG tablet     Sig: Take 1 tablet by mouth every morning (before breakfast)     Dispense:  30 tablet     Refill:  5    metFORMIN (GLUCOPHAGE-XR) 500 MG extended release tablet     Sig: Take with largest meal of the day. Dispense:  30 tablet     Refill:  5       There are no Patient Instructions on file for this visit.    /family given educational materials - see patient instructions. Discussed use, benefit, and side effects of prescribed medications. All patient/family questions answered and voiced understanding. Instructed to continue current medications, diet and exercise. Pt/family agreed with treatment plan. Follow up as directed and sooner if needed. Patient/ family instructed that is symptoms worsen or persist they are to contact office or report to nearest ER.   They voice understanding and agreement with this plan.   signed by EDELMIRA Bennett on

## 2022-01-12 RX ORDER — METFORMIN HYDROCHLORIDE 500 MG/1
TABLET, EXTENDED RELEASE ORAL
Qty: 30 TABLET | Refills: 1 | Status: SHIPPED | OUTPATIENT
Start: 2022-01-12 | End: 2022-04-12

## 2022-04-12 RX ORDER — METFORMIN HYDROCHLORIDE 500 MG/1
TABLET, EXTENDED RELEASE ORAL
Qty: 30 TABLET | Refills: 1 | Status: SHIPPED | OUTPATIENT
Start: 2022-04-12 | End: 2022-06-07

## 2022-06-07 RX ORDER — METFORMIN HYDROCHLORIDE 500 MG/1
TABLET, EXTENDED RELEASE ORAL
Qty: 30 TABLET | Refills: 1 | Status: SHIPPED | OUTPATIENT
Start: 2022-06-07 | End: 2022-08-08

## 2022-06-07 NOTE — TELEPHONE ENCOUNTER
Barbara Xiao called to request a refill on her medication.       Last office visit : 12/14/2021   Next office visit : 6/14/2022     Requested Prescriptions     Pending Prescriptions Disp Refills    metFORMIN (GLUCOPHAGE-XR) 500 mg extended release tablet [Pharmacy Med Name: METFORMIN HCL ER 500MG TB24] 30 tablet 1     Sig: TAKE 1 TABLET BY MOUTH ONE TIME A DAY WITH LARGEST MEAL OF THE DAY            Cuong Mendoza

## 2022-06-14 ENCOUNTER — OFFICE VISIT (OUTPATIENT)
Dept: PRIMARY CARE CLINIC | Age: 57
End: 2022-06-14
Payer: COMMERCIAL

## 2022-06-14 VITALS
DIASTOLIC BLOOD PRESSURE: 84 MMHG | SYSTOLIC BLOOD PRESSURE: 164 MMHG | WEIGHT: 243.8 LBS | OXYGEN SATURATION: 97 % | TEMPERATURE: 98.8 F | BODY MASS INDEX: 36.95 KG/M2 | HEIGHT: 68 IN | HEART RATE: 72 BPM

## 2022-06-14 DIAGNOSIS — G40.909 SEIZURE DISORDER (HCC): ICD-10-CM

## 2022-06-14 DIAGNOSIS — L23.5 ALLERGIC DERMATITIS DUE TO OTHER CHEMICAL PRODUCT: Primary | ICD-10-CM

## 2022-06-14 DIAGNOSIS — K21.9 GASTROESOPHAGEAL REFLUX DISEASE, UNSPECIFIED WHETHER ESOPHAGITIS PRESENT: ICD-10-CM

## 2022-06-14 DIAGNOSIS — F41.9 ANXIETY: ICD-10-CM

## 2022-06-14 DIAGNOSIS — I10 HYPERTENSION, ESSENTIAL, BENIGN: ICD-10-CM

## 2022-06-14 PROBLEM — L25.9 DERMATITIS VENENATA: Status: ACTIVE | Noted: 2022-06-14

## 2022-06-14 PROCEDURE — 99214 OFFICE O/P EST MOD 30 MIN: CPT | Performed by: NURSE PRACTITIONER

## 2022-06-14 RX ORDER — HYDROCHLOROTHIAZIDE 25 MG/1
12.5 TABLET ORAL DAILY
Qty: 30 TABLET | Refills: 2 | Status: SHIPPED | OUTPATIENT
Start: 2022-06-14 | End: 2022-08-09 | Stop reason: ALTCHOICE

## 2022-06-14 RX ORDER — PREDNISONE 10 MG/1
TABLET ORAL
Qty: 1 EACH | Refills: 0 | Status: SHIPPED | OUTPATIENT
Start: 2022-06-14

## 2022-06-14 RX ORDER — BUSPIRONE HYDROCHLORIDE 10 MG/1
10 TABLET ORAL 3 TIMES DAILY PRN
Qty: 90 TABLET | Refills: 0 | Status: SHIPPED | OUTPATIENT
Start: 2022-06-14 | End: 2022-07-14

## 2022-06-14 ASSESSMENT — PATIENT HEALTH QUESTIONNAIRE - PHQ9
SUM OF ALL RESPONSES TO PHQ QUESTIONS 1-9: 0
SUM OF ALL RESPONSES TO PHQ QUESTIONS 1-9: 0
1. LITTLE INTEREST OR PLEASURE IN DOING THINGS: 0
2. FEELING DOWN, DEPRESSED OR HOPELESS: 0
SUM OF ALL RESPONSES TO PHQ QUESTIONS 1-9: 0
SUM OF ALL RESPONSES TO PHQ9 QUESTIONS 1 & 2: 0
SUM OF ALL RESPONSES TO PHQ QUESTIONS 1-9: 0

## 2022-06-14 NOTE — PROGRESS NOTES
[address] [facility]  [address] [place] [address]  [id number]  [id number]  Dept: [phone]  Dept Fax: [phone]  Loc: [phone]        Mandy Adames is a 64 y.o. female who presents today for her medical conditions/ complaints as noted below. Mandy Adames is c/o Gastroesophageal Reflux and Eye Swelling        Chief Complaint   Patient presents with    Gastroesophageal Reflux    Eye Swelling       HPI:     HPI    Anxiety:   2022: family discord. Patient reports that her son is trying to finish his home that he has been remodeling for the last 3 years. She believes he is trying to rush through the renovations in effort to propose to his girlfriend. She states that she has been trying to paint and work on his house as well as a work a full-time job. Patient notes that her and her  are exhausted. She reports increased crying on and off. Eye problem contact dermatitis:   2022: Pt reports raw area above eye, itchy skin above eye. GERD:    2022: Stable per patient report  2021: much improved with protonix per pt report. Notes that nausea has resolved.       Seizure Disorder:   2022: Stable per patient report  2021: stable. hx of seizures that have been well controlled on carbamazepine 200 mg twice daily. No issues. Pt is due for carbamazepine level to be drawn. Patient reports that they have been compliant with taking medications as directed.      Past Medical History:   Diagnosis Date    Anxiety 2022    Chronic bilateral low back pain 10/16/2021    Class 2 severe obesity with serious comorbidity and body mass index (BMI) of 36.0 to 36.9 in adult [facility]) 10/16/2021    Gastroesophageal reflux disease 2021    Hypertension, essential, benign 2022    Seizures ([facility] [address]        Past Surgical History:   Procedure Laterality Date     SECTION      COLONOSCOPY N/A 1/15/2019    Dr Steven Nickerson, 5 yr recall Social History     Socioeconomic History    Marital status:      Spouse name: None    Number of children: None    Years of education: None    Highest education level: None   Occupational History    None   Tobacco Use    Smoking status: Former Smoker     Packs/day: 1.00     Years: 15.00     Pack years: 15.00    Smokeless tobacco: Never Used   Vaping Use    Vaping Use: Never used   Substance and Sexual Activity    Alcohol use: No    Drug use: No    Sexual activity: Yes   Other Topics Concern    None   Social History Narrative    None     Social Determinants of Health     Financial Resource Strain: Low Risk     Difficulty of Paying Living Expenses: Not hard at all   Food Insecurity: No Food Insecurity    Worried About Running Out of Food in the Last Year: Never true    Javi of Food in the Last Year: Never true   Transportation Needs:     Lack of Transportation (Medical): Not on file    Lack of Transportation (Non-Medical):  Not on file   Physical Activity:     Days of Exercise per Week: Not on file    Minutes of Exercise per Session: Not on file   Stress:     Feeling of Stress : Not on file   Social Connections:     Frequency of Communication with Friends and Family: Not on file    Frequency of Social Gatherings with Friends and Family: Not on file    Attends Catholic Services: Not on file    Active Member of [address] [place] [address] [employer] or Organizations: Not on file    Attends Club or Organization Meetings: Not on file    Marital Status: Not on file   Intimate Partner Violence:     Fear of Current or Ex-Partner: Not on file    Emotionally Abused: Not on file    Physically Abused: Not on file    Sexually Abused: Not on file   Housing Stability:     Unable to Pay for Housing in the Last Year: Not on file    Number of [place] in the Last Year: Not on file    Unstable Housing in the Last Year: Not on file       Family History   Problem Relation Age of Onset    High Blood Pressure Mother  Heart Disease Mother     High Blood Pressure Father     Heart Disease Father     Colon Cancer Father     Diabetes Maternal Grandmother        Current Outpatient Medications   Medication Sig Dispense Refill    predniSONE 10 MG (21) TBPK Take as directed per package instructions. 1 each 0    busPIRone (BUSPAR) 10 MG tablet Take 1 tablet by mouth 3 times daily as needed (anxiety) 90 tablet 0    hydroCHLOROthiazide (HYDRODIURIL) 25 MG tablet Take 0.5 tablets by mouth daily 30 tablet 2    metFORMIN (GLUCOPHAGE-XR) 500 mg extended release tablet TAKE 1 TABLET BY MOUTH ONE TIME A DAY WITH LARGEST MEAL OF THE DAY 30 tablet 1    pantoprazole (PROTONIX) 40 MG tablet Take 1 tablet by mouth every morning (before breakfast) 30 tablet 5    carBAMazepine (CARBATROL) 100 MG extended release capsule Take 2 capsules by mouth 2 times daily 360 capsule 3    VITAMIN D PO Take by mouth daily      Alum Hydroxide-Mag Carbonate (GAVISCON PO) Take by mouth nightly      conjugated estrogens (PREMARIN) 0.625 MG/GM vaginal cream Place 0.5 g vaginally Twice a Week 1 each 2    cetirizine (ZYRTEC ALLERGY) 10 MG tablet Take 1 tablet by mouth daily 30 tablet 0    ascorbic acid (VITAMIN C) 500 MG tablet Take 500 mg by mouth daily      calcium carbonate (OSCAL) 500 MG TABS tablet Take 500 mg by mouth daily      therapeutic multivitamin-minerals (THERAGRAN-M) tablet Take 1 tablet by mouth daily. No current facility-administered medications for this visit. Allergies   Allergen Reactions    Augmentin [Amoxicillin-Pot Clavulanate] Diarrhea     Severe diarrhea       Lab Review notapplicable    Subjective:   Review of Systems   Constitutional: Negative for activity change, appetite change, fatigue, fever and unexpected weight change. HENT: Negative for congestion, ear pain, nosebleeds, rhinorrhea, sore throat, trouble swallowing and voice change. Eyes: Positive for redness and itching.  Negative for visual disturbance. Respiratory: Negative for cough, chest tightness, shortness of breath and wheezing. Cardiovascular: Positive for leg swelling. Negative for chest pain and palpitations. Gastrointestinal: Negative for abdominal pain, blood in stool, constipation, diarrhea, nausea and vomiting. Endocrine: Negative for polydipsia, polyphagia and polyuria. Genitourinary: Negative for dysuria, frequency and urgency. Musculoskeletal: Negative for myalgias. Skin: Negative for rash and wound. Neurological: Negative for dizziness, speech difficulty, light-headedness and headaches. Psychiatric/Behavioral: Positive for agitation. Negative for confusion, self-injury and suicidal ideas. The patient is nervous/anxious. Objective:     Physical Exam  Vitals and nursing note reviewed. Constitutional:       General: She is not in acute distress. Appearance: She is well-developed. She is not diaphoretic. HENT:      Head: Normocephalic and atraumatic. Right Ear: External ear normal.      Left Ear: External ear normal.      Nose: Nose normal.      Mouth/Throat:      Pharynx: No oropharyngeal exudate. Eyes:      General:         Right eye: No discharge. Left eye: No discharge. Conjunctiva/sclera: Conjunctivae normal.      Pupils: Pupils are equal, round, and reactive to light. Cardiovascular:      Rate and Rhythm: Normal rate and regular rhythm. Heart sounds: Normal heart sounds. No murmur heard. Pulmonary:      Effort: Pulmonary effort is normal. No respiratory distress. Breath sounds: Normal breath sounds. No stridor. No wheezing or rales. Chest:      Chest wall: No tenderness. Breasts:      Right: No inverted nipple, mass, nipple discharge, skin change or tenderness. Left: No inverted nipple, mass, nipple discharge, skin change or tenderness. Abdominal:      General: Bowel sounds are normal. There is no distension. Palpations: Abdomen is soft. Tenderness: There is no abdominal tenderness. Musculoskeletal:         General: No tenderness or deformity. Normal range of motion. Cervical back: Normal range of motion and neck supple. Right lower le+ Edema present. Left lower le+ Edema present. Skin:     General: Skin is warm and dry. Findings: No erythema or rash. Neurological:      Mental Status: She is alert and oriented to person, place, and time. Cranial Nerves: No cranial nerve deficit. Coordination: Coordination normal.      Deep Tendon Reflexes: Reflexes are normal and symmetric. Psychiatric:         Thought Content: Thought content normal.      Comments: Crying during exam.        BP (!) 164/84 (Site: Right Upper Arm, Position: Sitting, Cuff Size: Large Adult)   Pulse 72   Temp 98.8 °F (37.1 °C) (Temporal)   Ht 5' 8\" (1.727 m)   Wt 243 lb 12.8 oz (110.6 kg)   LMP 2018 (Within Days)   SpO2 97%   BMI 37.07 kg/m²     Assessment:      Diagnosis Orders   1. 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 Gastroesophageal reflux disease, unspecified whether esophagitis present     3. Anxiety  busPIRone (BUSPAR) 10 MG tablet   4. Hypertension, essential, benign  hydroCHLOROthiazide (HYDRODIURIL) 25 MG tablet   5. Seizure disorder ([address]       No results found for this visit on 22. Plan:     1. Allergic dermatitis due to other chemical product    2. Gastroesophageal reflux disease, unspecified whether esophagitis present    3. Anxiety    4. Hypertension, essential, benign    5. Seizure disorder ([address]      Over 50% of the total visit time of 30 minutes was spent on counseling and or coordination of care of allergic dermatitis, GERD, anxiety, hypertension and seizure disorder. Patient will begin BuSpar 10 mg 3 times daily as needed for anxiety and crying spells. She will take prednisone for contact dermatitis noted to right eyelid.   She will begin HCTZ

## 2022-06-14 NOTE — PATIENT INSTRUCTIONS
Buspar 10mg three times daily as needed for anxiety and crying spells. Take prednisone dose for eye. Begin HCTZ 12.5mg daily for hypertension.

## 2022-06-17 PROBLEM — F41.9 ANXIETY: Status: ACTIVE | Noted: 2022-06-17

## 2022-06-17 PROBLEM — I10 HYPERTENSION, ESSENTIAL, BENIGN: Status: ACTIVE | Noted: 2022-06-17

## 2022-06-17 ASSESSMENT — ENCOUNTER SYMPTOMS
ABDOMINAL PAIN: 0
EYE REDNESS: 1
SHORTNESS OF BREATH: 0
NAUSEA: 0
SORE THROAT: 0
WHEEZING: 0
EYE ITCHING: 1
COUGH: 0
VOICE CHANGE: 0
DIARRHEA: 0
VOMITING: 0
BLOOD IN STOOL: 0
RHINORRHEA: 0
TROUBLE SWALLOWING: 0
CONSTIPATION: 0
CHEST TIGHTNESS: 0

## 2022-06-23 ENCOUNTER — OFFICE VISIT (OUTPATIENT)
Age: 57
End: 2022-06-23
Payer: COMMERCIAL

## 2022-06-23 VITALS
TEMPERATURE: 99 F | HEIGHT: 68 IN | HEART RATE: 113 BPM | OXYGEN SATURATION: 96 % | SYSTOLIC BLOOD PRESSURE: 120 MMHG | BODY MASS INDEX: 36.37 KG/M2 | WEIGHT: 240 LBS | RESPIRATION RATE: 18 BRPM | DIASTOLIC BLOOD PRESSURE: 70 MMHG

## 2022-06-23 DIAGNOSIS — J02.9 PHARYNGITIS, UNSPECIFIED ETIOLOGY: ICD-10-CM

## 2022-06-23 DIAGNOSIS — J02.9 SORE THROAT: ICD-10-CM

## 2022-06-23 DIAGNOSIS — J01.90 ACUTE SINUSITIS, RECURRENCE NOT SPECIFIED, UNSPECIFIED LOCATION: Primary | ICD-10-CM

## 2022-06-23 LAB — S PYO AG THROAT QL: NORMAL

## 2022-06-23 PROCEDURE — 99213 OFFICE O/P EST LOW 20 MIN: CPT | Performed by: NURSE PRACTITIONER

## 2022-06-23 PROCEDURE — 87880 STREP A ASSAY W/OPTIC: CPT | Performed by: NURSE PRACTITIONER

## 2022-06-23 RX ORDER — CEFDINIR 300 MG/1
300 CAPSULE ORAL 2 TIMES DAILY
Qty: 20 CAPSULE | Refills: 0 | Status: SHIPPED | OUTPATIENT
Start: 2022-06-23 | End: 2022-07-03

## 2022-06-23 ASSESSMENT — ENCOUNTER SYMPTOMS
COUGH: 1
SORE THROAT: 1

## 2022-06-23 NOTE — PATIENT INSTRUCTIONS
Patient Education        Sinusitis: Care Instructions  Your Care Instructions     Sinusitis is an infection of the lining of the sinus cavities in your head. Sinusitis often follows a cold. It causes pain and pressure in your head andface. In most cases, sinusitis gets better on its own in 1 to 2 weeks. But some mildsymptoms may last for several weeks. Sometimes antibiotics are needed. Follow-up care is a key part of your treatment and safety. Be sure to make and go to all appointments, and call your doctor if you are having problems. It's also a good idea to know your test results and keep alist of the medicines you take. How can you care for yourself at home?  Take an over-the-counter pain medicine, such as acetaminophen (Tylenol), ibuprofen (Advil, Motrin), or naproxen (Aleve). Read and follow all instructions on the label.  If the doctor prescribed antibiotics, take them as directed. Do not stop taking them just because you feel better. You need to take the full course of antibiotics.  Be careful when taking over-the-counter cold or flu medicines and Tylenol at the same time. Many of these medicines have acetaminophen, which is Tylenol. Read the labels to make sure that you are not taking more than the recommended dose. Too much acetaminophen (Tylenol) can be harmful.  Breathe warm, moist air from a steamy shower, a hot bath, or a sink filled with hot water. Avoid cold, dry air. Using a humidifier in your home may help. Follow the directions for cleaning the machine.  Use saline (saltwater) nasal washes. This can help keep your nasal passages open and wash out mucus and bacteria. You can buy saline nose drops at a grocery store or drugstore. Or you can make your own at home by adding 1 teaspoon of salt and 1 teaspoon of baking soda to 2 cups of distilled water. If you make your own, fill a bulb syringe with the solution, insert the tip into your nostril, and squeeze gently. Izora Booty your nose.    Put a hot, wet towel or a warm gel pack on your face 3 or 4 times a day for 5 to 10 minutes each time.  Try a decongestant nasal spray like oxymetazoline (Afrin). Do not use it for more than 3 days in a row. Using it for more than 3 days can make your congestion worse. When should you call for help? Call your doctor now or seek immediate medical care if:     You have new or worse swelling or redness in your face or around your eyes.      You have a new or higher fever. Watch closely for changes in your health, and be sure to contact your doctor if:     You have new or worse facial pain.      The mucus from your nose becomes thicker (like pus) or has new blood in it.      You are not getting better as expected. Where can you learn more? Go to https://[employer] Solareweb.[employer]. org and sign in to your [employer] account. Enter T337 in the [employer]ption box to learn more about \"Sinusitis: Care Instructions. \"     If you do not have an account, please click on the \"Sign Up Now\" link. Current as of: September 8, 2021               Content Version: 13.3  © 2006-2022 [employer]. Care instructions adapted under license by [facility] [address] If you have questions about a medical condition or this instruction, always ask your healthcare professional. [place] [address] any warranty or liability for your use of this information. Patient Education        Sore Throat: Care Instructions  Overview     Infection by bacteria or a virus causes most sore throats. Cigarette smoke, dry air, air pollution, allergies, and yelling can also cause a sore throat. Sore throats can be painful and annoying. Fortunately, most sore throats go away on their own. If you have a bacterial infection, your doctor may prescribeantibiotics. Follow-up care is a key part of your treatment and safety. Be sure to make and go to all appointments, and call your doctor if you are having problems.  It's also a good idea to know your test results and keep alist of the medicines you take. How can you care for yourself at home?  If your doctor prescribed antibiotics, take them as directed. Do not stop taking them just because you feel better. You need to take the full course of antibiotics.  Gargle with warm salt water several times a day to help reduce swelling and relieve pain. Mix 1/2 teaspoon of salt in 1 cup of warm water.  Take an over-the-counter pain medicine, such as acetaminophen (Tylenol), ibuprofen (Advil, Motrin), or naproxen (Aleve). Read and follow all instructions on the label.  Be careful when taking over-the-counter cold or flu medicines and Tylenol at the same time. Many of these medicines have acetaminophen, which is Tylenol. Read the labels to make sure that you are not taking more than the recommended dose. Too much acetaminophen (Tylenol) can be harmful.  Drink plenty of fluids. Fluids may help soothe an irritated throat. Hot fluids, such as tea or soup, may help decrease throat pain.  Use over-the-counter throat lozenges to soothe pain. Regular cough drops or hard candy may also help. These should not be given to young children because of the risk of choking.  Do not smoke or allow others to smoke around you. If you need help quitting, talk to your doctor about stop-smoking programs and medicines. These can increase your chances of quitting for good.  Use a vaporizer or humidifier to add moisture to your bedroom. Follow the directions for cleaning the machine. When should you call for help? Call your doctor now or seek immediate medical care if:     You have trouble breathing.      Your sore throat gets much worse on one side.      You have new or worse trouble swallowing.      You have a new or higher fever. Watch closely for changes in your health, and be sure to contact your doctor ifyou do not get better as expected. Where can you learn more?   Go to https://chpepiceweb.[email or web address]. org and sign in to your [employer] account. Enter T920 in the [place] box to learn more about \"Sore Throat: Care Instructions. \"     If you do not have an account, please click on the \"Sign Up Now\" link. Current as of: September 8, 2021               Content Version: 13.3  © 2006-2022 [employer]. Care instructions adapted under license by [facility] ([facility]). If you have questions about a medical condition or this instruction, always ask your healthcare professional. [place] [address] any warranty or liability for your use of this information. 1. Take full course of antibiotics  2. Monitor for fever and treat as needed  3. Replace toothbrush in 24-48 hours after antibiotics are started  4. If patient is not improving or developing any new/worsening symptoms then return to clinic as needed or follow up with PCP     Symptomatic Treatments for Sore Throat   1. Sipping cold or warm beverages   2. Eating cold or frozen desserts (eg, ice cream, popsicles)  3. Sucking on ice  4. Sucking on hard candy  For children 5 years and older and adolescents, suggest sucking on hard candy rather than medicated throat lozenges (eg, cough drops, troches, or pastilles) or medicated sprays. Hard candy and lozenges should not be used in children  4 years and younger of age because they are a choking hazard. 5. Gargling with warm salt water  For children 6 years and older of age and adolescents, suggest gargling with warm salt water. Most recipes call for ¼ to ½ teaspoon of salt per 8 ounces (approximately 240 mL) of warm water. Children <6 years generally cannot gargle properly.   6. Tylenol or Ibuprofen for pain

## 2022-06-23 NOTE — LETTER
[facility]  [address] [address]  Phone: [phone]  Fax: EDELMIRA Mishra        June 23, 2022     Patient: Jolie Ricci   YOB: 1965   Date of Visit: 6/23/2022       To Whom It May Concern: It is my medical opinion that Lenell Burn may return to work on 6/24/2022. If you have any questions or concerns, please don't hesitate to call.     Sincerely,        Anamika Germain, APRN

## 2022-06-23 NOTE — PROGRESS NOTES
[id number]  [address] [facility] [address] [id number]  Dept: [phone]  Dept Fax: [phone]  Loc: [phone]    Mandy Adames is a 64 y.o. female who presents today for her medical conditions/complaintsas noted below. Mandy Adames is c/o of Cough, Pharyngitis, and Congestion (chest sheri- yellow mucus )        HPI:     Cough  This is a new problem. Episode onset: a week. The problem has been unchanged. The problem occurs constantly. The cough is productive of sputum. Associated symptoms include a sore throat. Pertinent negatives include no chills or fever. Nothing aggravates the symptoms. She has tried OTC cough suppressant (mucinex) for the symptoms. Pharyngitis  This is a new problem. Episode onset: a week. Associated symptoms include coughing and a sore throat. Pertinent negatives include no chills, congestion or fever. Treatments tried: warm salt water gargles. The treatment provided no relief.        Past Medical History:   Diagnosis Date    Anxiety 2022    Chronic bilateral low back pain 10/16/2021    Class 2 severe obesity with serious comorbidity and body mass index (BMI) of 36.0 to 36.9 in adult [facility]) 10/16/2021    Gastroesophageal reflux disease 2021    Hypertension, essential, benign 2022    Seizures ([facility] [address]      Past Surgical History:   Procedure Laterality Date     SECTION      COLONOSCOPY N/A 1/15/2019    Dr Aubrie Edwards, 5 yr recall       Family History   Problem Relation Age of Onset    High Blood Pressure Mother     Heart Disease Mother     High Blood Pressure Father     Heart Disease Father     Colon Cancer Father     Diabetes Maternal Grandmother        Social History     Tobacco Use    Smoking status: Former Smoker     Packs/day: 1.00     Years: 15.00     Pack years: 15.00    Smokeless tobacco: Never Used   Substance Use Topics    Alcohol use: No      Current Outpatient Medications Medication Sig Dispense Refill    cefdinir (OMNICEF) 300 MG capsule Take 1 capsule by mouth 2 times daily for 10 days 20 capsule 0    busPIRone (BUSPAR) 10 MG tablet Take 1 tablet by mouth 3 times daily as needed (anxiety) 90 tablet 0    hydroCHLOROthiazide (HYDRODIURIL) 25 MG tablet Take 0.5 tablets by mouth daily 30 tablet 2    metFORMIN (GLUCOPHAGE-XR) 500 mg extended release tablet TAKE 1 TABLET BY MOUTH ONE TIME A DAY WITH LARGEST MEAL OF THE DAY 30 tablet 1    pantoprazole (PROTONIX) 40 MG tablet Take 1 tablet by mouth every morning (before breakfast) 30 tablet 5    carBAMazepine (CARBATROL) 100 MG extended release capsule Take 2 capsules by mouth 2 times daily 360 capsule 3    VITAMIN D PO Take by mouth daily      conjugated estrogens (PREMARIN) 0.625 MG/GM vaginal cream Place 0.5 g vaginally Twice a Week 1 each 2    cetirizine (ZYRTEC ALLERGY) 10 MG tablet Take 1 tablet by mouth daily 30 tablet 0    ascorbic acid (VITAMIN C) 500 MG tablet Take 500 mg by mouth daily      calcium carbonate (OSCAL) 500 MG TABS tablet Take 500 mg by mouth daily      therapeutic multivitamin-minerals (THERAGRAN-M) tablet Take 1 tablet by mouth daily.  predniSONE 10 MG (21) TBPK Take as directed per package instructions. (Patient not taking: Reported on 6/23/2022) 1 each 0    Alum Hydroxide-Mag Carbonate (GAVISCON PO) Take by mouth nightly (Patient not taking: Reported on 6/23/2022)       No current facility-administered medications for this visit.      Allergies   Allergen Reactions    Augmentin [Amoxicillin-Pot Clavulanate] Diarrhea     Severe diarrhea       Health Maintenance   Topic Date Due    HIV screen  Never done    DTaP/Tdap/Td vaccine (1 - Tdap) Never done    Shingles vaccine (1 of 2) Never done    COVID-19 Vaccine (3 - Booster for Pfizer series) 03/13/2022    Depression Screen  06/14/2023    Breast cancer screen  12/10/2023    Colorectal Cancer Screen  01/15/2024    Lipids  09/09/2026    01/01/2018 (Within Days)   SpO2 96%   BMI 36.49 kg/m²     :Assessment       Diagnosis Orders   1. Acute sinusitis, recurrence not specified, unspecified location  cefdinir (OMNICEF) 300 MG capsule   2. Sore throat  POCT rapid strep A   3. Pharyngitis, unspecified etiology  cefdinir (OMNICEF) 300 MG capsule       :Plan    1. Take full course of antibiotics  2. Monitor for fever and treat as needed  3. Replace toothbrush in 24-48 hours after antibiotics are started  4. If patient is not improving or developing any new/worsening symptoms then return to clinic as needed or follow up with PCP     Symptomatic Treatments for Sore Throat   1. Sipping cold or warm beverages   2. Eating cold or frozen desserts (eg, ice cream, popsicles)  3. Sucking on ice  4. Sucking on hard candy  For children 5 years and older and adolescents, suggest sucking on hard candy rather than medicated throat lozenges (eg, cough drops, troches, or pastilles) or medicated sprays. Hard candy and lozenges should not be used in children  4 years and younger of age because they are a choking hazard. 5. Gargling with warm salt water  For children 6 years and older of age and adolescents, suggest gargling with warm salt water. Most recipes call for ¼ to ½ teaspoon of salt per 8 ounces (approximately 240 mL) of warm water. Children <6 years generally cannot gargle properly. 6. Tylenol or Ibuprofen for pain   Orders Placed This Encounter   Procedures    POCT rapid strep A     Results for orders placed or performed in visit on 06/23/22   POCT rapid strep A   Result Value Ref Range    Strep A Ag None Detected None Detected         No follow-ups on file. Orders Placed This Encounter   Medications    cefdinir (OMNICEF) 300 MG capsule     Sig: Take 1 capsule by mouth 2 times daily for 10 days     Dispense:  20 capsule     Refill:  0       Patient given educational materials- see patient instructions.   Discussed use, benefit, and side effects of prescribedmedications. All patient questions answered. Pt voiced understanding. Patient Instructions       Patient Education        Sinusitis: Care Instructions  Your Care Instructions     Sinusitis is an infection of the lining of the sinus cavities in your head. Sinusitis often follows a cold. It causes pain and pressure in your head andface. In most cases, sinusitis gets better on its own in 1 to 2 weeks. But some mildsymptoms may last for several weeks. Sometimes antibiotics are needed. Follow-up care is a key part of your treatment and safety. Be sure to make and go to all appointments, and call your doctor if you are having problems. It's also a good idea to know your test results and keep alist of the medicines you take. How can you care for yourself at home?  Take an over-the-counter pain medicine, such as acetaminophen (Tylenol), ibuprofen (Advil, Motrin), or naproxen (Aleve). Read and follow all instructions on the label.  If the doctor prescribed antibiotics, take them as directed. Do not stop taking them just because you feel better. You need to take the full course of antibiotics.  Be careful when taking over-the-counter cold or flu medicines and Tylenol at the same time. Many of these medicines have acetaminophen, which is Tylenol. Read the labels to make sure that you are not taking more than the recommended dose. Too much acetaminophen (Tylenol) can be harmful.  Breathe warm, moist air from a steamy shower, a hot bath, or a sink filled with hot water. Avoid cold, dry air. Using a humidifier in your home may help. Follow the directions for cleaning the machine.  Use saline (saltwater) nasal washes. This can help keep your nasal passages open and wash out mucus and bacteria. You can buy saline nose drops at a grocery store or drugstore. Or you can make your own at home by adding 1 teaspoon of salt and 1 teaspoon of baking soda to 2 cups of distilled water.  If you make your own, fill a bulb syringe with the solution, insert the tip into your nostril, and squeeze gently. Debera Poplin your nose.  Put a hot, wet towel or a warm gel pack on your face 3 or 4 times a day for 5 to 10 minutes each time.  Try a decongestant nasal spray like oxymetazoline (Afrin). Do not use it for more than 3 days in a row. Using it for more than 3 days can make your congestion worse. When should you call for help? Call your doctor now or seek immediate medical care if:     You have new or worse swelling or redness in your face or around your eyes.      You have a new or higher fever. Watch closely for changes in your health, and be sure to contact your doctor if:     You have new or worse facial pain.      The mucus from your nose becomes thicker (like pus) or has new blood in it.      You are not getting better as expected. Where can you learn more? Go to https://[employer].Tagoodies. org and sign in to your Insync account. Enter V598 in the [employer] box to learn more about \"Sinusitis: Care Instructions. \"     If you do not have an account, please click on the \"Sign Up Now\" link. Current as of: September 8, 2021               Content Version: 13.3  © 2006-2022 Healthwise, Inc[employer]. Care instructions adapted under license by [facility] ([facility]). If you have questions about a medical condition or this instruction, always ask your healthcare professional. [place] [address] any warranty or liability for your use of this information. Patient Education        Sore Throat: Care Instructions  Overview     Infection by bacteria or a virus causes most sore throats. Cigarette smoke, dry air, air pollution, allergies, and yelling can also cause a sore throat. Sore throats can be painful and annoying. Fortunately, most sore throats go away on their own. If you have a bacterial infection, your doctor may prescribeantibiotics.   Follow-up care is a key part of your treatment and safety. Be sure to make and go to all appointments, and call your doctor if you are having problems. It's also a good idea to know your test results and keep alist of the medicines you take. How can you care for yourself at home?  If your doctor prescribed antibiotics, take them as directed. Do not stop taking them just because you feel better. You need to take the full course of antibiotics.  Gargle with warm salt water several times a day to help reduce swelling and relieve pain. Mix 1/2 teaspoon of salt in 1 cup of warm water.  Take an over-the-counter pain medicine, such as acetaminophen (Tylenol), ibuprofen (Advil, Motrin), or naproxen (Aleve). Read and follow all instructions on the label.  Be careful when taking over-the-counter cold or flu medicines and Tylenol at the same time. Many of these medicines have acetaminophen, which is Tylenol. Read the labels to make sure that you are not taking more than the recommended dose. Too much acetaminophen (Tylenol) can be harmful.  Drink plenty of fluids. Fluids may help soothe an irritated throat. Hot fluids, such as tea or soup, may help decrease throat pain.  Use over-the-counter throat lozenges to soothe pain. Regular cough drops or hard candy may also help. These should not be given to young children because of the risk of choking.  Do not smoke or allow others to smoke around you. If you need help quitting, talk to your doctor about stop-smoking programs and medicines. These can increase your chances of quitting for good.  Use a vaporizer or humidifier to add moisture to your bedroom. Follow the directions for cleaning the machine. When should you call for help? Call your doctor now or seek immediate medical care if:     You have trouble breathing.      Your sore throat gets much worse on one side.      You have new or worse trouble swallowing.      You have a new or higher fever.    Watch closely for changes in your health, and be sure to contact your doctor ifyou do not get better as expected. Where can you learn more? Go to https://chpepiceweb.healthIOCS. org and sign in to your Arcamed account. Enter Z606 in the [facility] box to learn more about \"Sore Throat: Care Instructions. \"     If you do not have an account, please click on the \"Sign Up Now\" link. Current as of: September 8, 2021               Content Version: 13.3  © 6904-2992 Healthwise, [employer]. Care instructions adapted under license by [email or web address] ([facility]). If you have questions about a medical condition or this instruction, always ask your healthcare professional. [place] [address] any warranty or liability for your use of this information. 1. Take full course of antibiotics  2. Monitor for fever and treat as needed  3. Replace toothbrush in 24-48 hours after antibiotics are started  4. If patient is not improving or developing any new/worsening symptoms then return to clinic as needed or follow up with PCP     Symptomatic Treatments for Sore Throat   1. Sipping cold or warm beverages   2. Eating cold or frozen desserts (eg, ice cream, popsicles)  3. Sucking on ice  4. Sucking on hard candy  For children 5 years and older and adolescents, suggest sucking on hard candy rather than medicated throat lozenges (eg, cough drops, troches, or pastilles) or medicated sprays. Hard candy and lozenges should not be used in children  4 years and younger of age because they are a choking hazard. 5. Gargling with warm salt water  For children 6 years and older of age and adolescents, suggest gargling with warm salt water. Most recipes call for ¼ to ½ teaspoon of salt per 8 ounces (approximately 240 mL) of warm water. Children <6 years generally cannot gargle properly.   6. Tylenol or Ibuprofen for pain           Electronically signed by EDELMIRA Worthington on 6/23/2022 at 10:12 AM

## 2022-08-05 NOTE — TELEPHONE ENCOUNTER
Amy Angel called to request a refill on her medication.       Last office visit : 6/14/2022   Next office visit : 8/9/2022     Requested Prescriptions     Pending Prescriptions Disp Refills    metFORMIN (GLUCOPHAGE-XR) 500 MG extended release tablet [Pharmacy Med Name: METFORMIN HCL ER 500MG TB24] 30 tablet 1     Sig: TAKE 1 TABLET BY MOUTH ONE TIME A DAY WITH LARGEST MEAL OF THE DAY            Opt[employer]Link

## 2022-08-08 PROBLEM — Z63.8 FAMILY DISCORD: Status: ACTIVE | Noted: 2022-08-08

## 2022-08-08 RX ORDER — METFORMIN HYDROCHLORIDE 500 MG/1
TABLET, EXTENDED RELEASE ORAL
Qty: 90 TABLET | Refills: 1 | Status: SHIPPED | OUTPATIENT
Start: 2022-08-08

## 2022-08-08 ASSESSMENT — ENCOUNTER SYMPTOMS
SORE THROAT: 0
ABDOMINAL PAIN: 0
BLOOD IN STOOL: 0
VOMITING: 0
EYE ITCHING: 1
EYE REDNESS: 1
COUGH: 0
NAUSEA: 0
SHORTNESS OF BREATH: 0
CHEST TIGHTNESS: 0
TROUBLE SWALLOWING: 0
RHINORRHEA: 0
CONSTIPATION: 0
DIARRHEA: 0
VOICE CHANGE: 0
WHEEZING: 0

## 2022-08-09 ENCOUNTER — TELEPHONE (OUTPATIENT)
Dept: OBGYN CLINIC | Age: 57
End: 2022-08-09

## 2022-08-09 ENCOUNTER — OFFICE VISIT (OUTPATIENT)
Dept: PRIMARY CARE CLINIC | Age: 57
End: 2022-08-09
Payer: COMMERCIAL

## 2022-08-09 VITALS
BODY MASS INDEX: 37.35 KG/M2 | HEART RATE: 81 BPM | DIASTOLIC BLOOD PRESSURE: 88 MMHG | HEIGHT: 68 IN | WEIGHT: 246.4 LBS | TEMPERATURE: 98.5 F | OXYGEN SATURATION: 98 % | SYSTOLIC BLOOD PRESSURE: 138 MMHG

## 2022-08-09 DIAGNOSIS — K21.9 GASTROESOPHAGEAL REFLUX DISEASE, UNSPECIFIED WHETHER ESOPHAGITIS PRESENT: ICD-10-CM

## 2022-08-09 DIAGNOSIS — F41.9 ANXIETY: Primary | ICD-10-CM

## 2022-08-09 DIAGNOSIS — Z63.8 FAMILY DISCORD: ICD-10-CM

## 2022-08-09 DIAGNOSIS — I10 HYPERTENSION, ESSENTIAL, BENIGN: ICD-10-CM

## 2022-08-09 DIAGNOSIS — G40.909 SEIZURE DISORDER (HCC): ICD-10-CM

## 2022-08-09 DIAGNOSIS — E66.9 OBESITY, UNSPECIFIED CLASSIFICATION, UNSPECIFIED OBESITY TYPE, UNSPECIFIED WHETHER SERIOUS COMORBIDITY PRESENT: ICD-10-CM

## 2022-08-09 DIAGNOSIS — Z12.31 SCREENING MAMMOGRAM FOR BREAST CANCER: Primary | ICD-10-CM

## 2022-08-09 PROCEDURE — 99214 OFFICE O/P EST MOD 30 MIN: CPT | Performed by: NURSE PRACTITIONER

## 2022-08-09 RX ORDER — TRIAMTERENE AND HYDROCHLOROTHIAZIDE 37.5; 25 MG/1; MG/1
1 TABLET ORAL DAILY
Qty: 90 TABLET | Refills: 1 | Status: SHIPPED | OUTPATIENT
Start: 2022-08-09

## 2022-08-09 RX ORDER — ESCITALOPRAM OXALATE 10 MG/1
10 TABLET ORAL EVERY EVENING
Qty: 90 TABLET | Refills: 1 | Status: SHIPPED | OUTPATIENT
Start: 2022-08-09 | End: 2022-11-07

## 2022-08-09 RX ORDER — HYDROCHLOROTHIAZIDE 25 MG/1
12.5 TABLET ORAL DAILY
Qty: 30 TABLET | Refills: 2 | Status: CANCELLED | OUTPATIENT
Start: 2022-08-09 | End: 2022-09-08

## 2022-08-09 SDOH — SOCIAL STABILITY - SOCIAL INSECURITY: OTHER SPECIFIED PROBLEMS RELATED TO PRIMARY SUPPORT GROUP: Z63.8

## 2022-08-09 NOTE — PROGRESS NOTES
[address] [facility]  [address] [place] [address]  [id number]  [address] [id number]  Dept: [phone]  Dept Fax: [phone]  Loc: [phone]        Shira Schroeder is a 64 y.o. female who presents today for her medical conditions/ complaints as noted below. Shira Schroeder is c/o 1 Month Follow-Up        Chief Complaint   Patient presents with    1 Month Follow-Up         HPI:     HPI    Anxiety:  8/9/2022: Patient started on BuSpar 10 mg 3 times daily for anxiety and crying spells at previous appointment. Believes that buspar is helping. 6/14/2022: family discord. Patient reports that her son is trying to finish his home that he has been remodeling for the last 3 years. She believes he is trying to rush through the renovations in effort to propose to his girlfriend. She states that she has been trying to paint and work on his house as well as a work a full-time job. Patient notes that her and her  are exhausted. She reports increased crying on and off. Eye problem contact dermatitis:   8/9/2022: Patient was prescribed prednisone for contact dermatitis of 5. Resolved per pt report. 6/14/2022: Pt reports raw area above eye, itchy skin above eye. Hypertension  8/9/2022: Patient started on HCTZ 12.5 mg daily at previous appointment. Pt b/p still slightly elevated. Still also struggling with edema. GERD:   8/9/2022: Current regimen includes Protonix 40 mg daily. Stable no issues. 6/14/2022: Stable per patient report  12/14/2021: much improved with protonix per pt report. Notes that nausea has resolved. Seizure Disorder:  8/9/2022: Current regimen includes carbamazepine 200 mg twice daily. Last carbamazepine level was 3.5 November 30, 2021. Management of this medication along with seizures by Dr. Kevin Amaya. 6/14/2022: Stable per patient report  12/14/2021: stable.   hx of seizures that have been well controlled on carbamazepine 200 mg twice daily. No issues. Pt is due for carbamazepine level to be drawn. Patient reports that they have been compliant with taking medications as directed. Past Medical History:   Diagnosis Date    Anxiety 2022    Chronic bilateral low back pain 10/16/2021    Class 2 severe obesity with serious comorbidity and body mass index (BMI) of 36.0 to 36.9 in adult [facility]) 10/16/2021    Gastroesophageal reflux disease 2021    Hypertension, essential, benign 2022    Seizures ([facility] [address]        Past Surgical History:   Procedure Laterality Date     SECTION      COLONOSCOPY N/A 1/15/2019    Dr Jory Mao, 5 yr recall       Social History     Socioeconomic History    Marital status:      Spouse name: None    Number of children: None    Years of education: None    Highest education level: None   Tobacco Use    Smoking status: Former     Packs/day: 1.00     Years: 15.00     Pack years: 15.00     Types: Cigarettes    Smokeless tobacco: Never   Vaping Use    Vaping Use: Never used   Substance and Sexual Activity    Alcohol use: No    Drug use: No    Sexual activity: Yes     Social Determinants of Health     Financial Resource Strain: Low Risk     Difficulty of Paying Living Expenses: Not hard at all   Food Insecurity: No Food Insecurity    Worried About Running Out of Food in the Last Year: Never true    Ran Out of Food in the Last Year: Never true       Family History   Problem Relation Age of Onset    High Blood Pressure Mother     Heart Disease Mother     High Blood Pressure Father     Heart Disease Father     Colon Cancer Father     Diabetes Maternal Grandmother        Current Outpatient Medications   Medication Sig Dispense Refill    escitalopram (LEXAPRO) 10 MG tablet Take 1 tablet by mouth every evening 90 tablet 1    triamterene-hydroCHLOROthiazide (MAXZIDE-25) 37.5-25 MG per tablet Take 1 tablet by mouth in the morning.  90 tablet 1    metFORMIN (GLUCOPHAGE-XR) 500 MG extended release tablet TAKE 1 TABLET BY MOUTH ONE TIME A DAY WITH LARGEST MEAL OF THE DAY 90 tablet 1    pantoprazole (PROTONIX) 40 MG tablet Take 1 tablet by mouth every morning (before breakfast) 30 tablet 5    carBAMazepine (CARBATROL) 100 MG extended release capsule Take 2 capsules by mouth 2 times daily 360 capsule 3    VITAMIN D PO Take by mouth daily      conjugated estrogens (PREMARIN) 0.625 MG/GM vaginal cream Place 0.5 g vaginally Twice a Week 1 each 2    cetirizine (ZYRTEC ALLERGY) 10 MG tablet Take 1 tablet by mouth daily 30 tablet 0    ascorbic acid (VITAMIN C) 500 MG tablet Take 500 mg by mouth daily      calcium carbonate (OSCAL) 500 MG TABS tablet Take 500 mg by mouth daily      therapeutic multivitamin-minerals (THERAGRAN-M) tablet Take 1 tablet by mouth daily. predniSONE 10 MG (21) TBPK Take as directed per package instructions. (Patient not taking: Reported on 8/9/2022) 1 each 0    Alum Hydroxide-Mag Carbonate (GAVISCON PO) Take by mouth nightly (Patient not taking: No sig reported)       No current facility-administered medications for this visit. Allergies   Allergen Reactions    Augmentin [Amoxicillin-Pot Clavulanate] Diarrhea     Severe diarrhea       Lab Review notapplicable    Subjective:   Review of Systems   Constitutional:  Negative for activity change, appetite change, fatigue, fever and unexpected weight change. HENT:  Negative for congestion, ear pain, nosebleeds, rhinorrhea, sore throat, trouble swallowing and voice change. Eyes:  Positive for redness and itching. Negative for visual disturbance. Respiratory:  Negative for cough, chest tightness, shortness of breath and wheezing. Cardiovascular:  Positive for leg swelling. Negative for chest pain and palpitations. Gastrointestinal:  Negative for abdominal pain, blood in stool, constipation, diarrhea, nausea and vomiting. Endocrine: Negative for polydipsia, polyphagia and polyuria.    Genitourinary:  Negative for dysuria, frequency and urgency. Musculoskeletal:  Negative for myalgias. Skin:  Negative for rash and wound. Neurological:  Negative for dizziness, speech difficulty, light-headedness and headaches. Psychiatric/Behavioral:  Positive for agitation. Negative for confusion, self-injury and suicidal ideas. The patient is nervous/anxious. Objective:     Physical Exam  Vitals and nursing note reviewed. Constitutional:       General: She is not in acute distress. Appearance: She is well-developed. She is not diaphoretic. HENT:      Head: Normocephalic and atraumatic. Right Ear: External ear normal.      Left Ear: External ear normal.      Nose: Nose normal.      Mouth/Throat:      Pharynx: No oropharyngeal exudate. Eyes:      General:         Right eye: No discharge. Left eye: No discharge. Conjunctiva/sclera: Conjunctivae normal.      Pupils: Pupils are equal, round, and reactive to light. Cardiovascular:      Rate and Rhythm: Normal rate and regular rhythm. Heart sounds: Normal heart sounds. No murmur heard. Pulmonary:      Effort: Pulmonary effort is normal. No respiratory distress. Breath sounds: Normal breath sounds. No stridor. No wheezing or rales. Chest:      Chest wall: No tenderness. Breasts:     Right: No inverted nipple, mass, nipple discharge, skin change or tenderness. Left: No inverted nipple, mass, nipple discharge, skin change or tenderness. Abdominal:      General: Bowel sounds are normal. There is no distension. Palpations: Abdomen is soft. Tenderness: There is no abdominal tenderness. Musculoskeletal:         General: No tenderness or deformity. Normal range of motion. Cervical back: Normal range of motion and neck supple. Right lower le+ Edema present. Left lower le+ Edema present. Skin:     General: Skin is warm and dry. Findings: No erythema or rash.    Neurological:      Mental Status: She is alert and oriented to person, place, and time. Cranial Nerves: No cranial nerve deficit. Coordination: Coordination normal.      Deep Tendon Reflexes: Reflexes are normal and symmetric. Psychiatric:         Thought Content: Thought content normal.      Comments: Crying during exam.      /88 (Site: Right Upper Arm, Position: Sitting, Cuff Size: Large Adult)   Pulse 81   Temp 98.5 °F (36.9 °C) (Temporal)   Ht 5' 8\" (1.727 m)   Wt 246 lb 6.4 oz (111.8 kg)   LMP 01/01/2018 (Within Days)   SpO2 98%   BMI 37.46 kg/m²     Assessment:      Diagnosis Orders   1. Anxiety  escitalopram (LEXAPRO) 10 MG tablet      2. Family discord  escitalopram (LEXAPRO) 10 MG tablet      3. Seizure disorder ([facility])  Carbamazepine Level, Total      4. Gastroesophageal reflux disease, unspecified whether esophagitis present        5. Hypertension, essential, benign  triamterene-hydroCHLOROthiazide (MAXZIDE-25) 37.5-25 MG per tablet    CBC with Auto Differential    Comprehensive Metabolic Panel    Hemoglobin A1C    Lipid Panel    TSH    T4, Free    Urinalysis      6. Obesity, unspecified classification, unspecified obesity type, unspecified whether serious comorbidity present  Lipid Panel        No results found for this visit on 08/09/22. Plan:     1. Anxiety    2. Family discord    3. Seizure disorder ([facility] [address]    4. Gastroesophageal reflux disease, unspecified whether esophagitis present    5. Hypertension, essential, benign    6. Obesity, unspecified classification, unspecified obesity type, unspecified whether serious comorbidity present      Over 50% of the total visit time of 30 minutes was spent on counseling and or coordination of care of   Anxiety, family discord, seizure disorder, GERD,  hypertension. Return in about 3 months (around 11/9/2022).   Orders Placed This Encounter   Procedures    CBC with Auto Differential     Standing Status:   Future     Standing Expiration Date:   8/9/2023    Comprehensive Metabolic Panel Standing Status:   Future     Standing Expiration Date:   8/9/2023    Hemoglobin A1C     Standing Status:   Future     Standing Expiration Date:   8/9/2023    Lipid Panel     Standing Status:   Future     Standing Expiration Date:   8/9/2023     Order Specific Question:   Is Patient Fasting?/# of Hours     Answer:   yes    TSH     Standing Status:   Future     Standing Expiration Date:   8/9/2023    T4, Free     Standing Status:   Future     Standing Expiration Date:   8/9/2023    Urinalysis     Standing Status:   Future     Standing Expiration Date:   8/9/2023    Carbamazepine Level, Total     Standing Status:   Future     Standing Expiration Date:   8/9/2023     Order Specific Question:   Dose Schedule & Time of Last Dose? Answer:   ?       Orders Placed This Encounter   Medications    escitalopram (LEXAPRO) 10 MG tablet     Sig: Take 1 tablet by mouth every evening     Dispense:  90 tablet     Refill:  1    triamterene-hydroCHLOROthiazide (MAXZIDE-25) 37.5-25 MG per tablet     Sig: Take 1 tablet by mouth in the morning. Dispense:  90 tablet     Refill:  1         Patient Instructions   Labs before follow up in 3 months. Stop HCTZ 12.5mg. Begin maxide daily. Begin lexapro 10mg at bedtime. Continue buspar 10 mg as needed.       Nando Chopra given educational materials - see patient instructions. Discussed use, benefit, and side effects of prescribed medications. All patient/family questions answered and voiced understanding. Instructed to continue current medications, diet and exercise. Pt/family agreed with treatment plan. Follow up as directed and sooner if needed. Patient/ family instructed that is symptoms worsen or persist they are to contact office or report to nearest ER. They voice understanding and agreement with this plan.   signed by EDELMIRA Hall on 8/9/2022 at 9:35 AM CDT    This dictation was generated by voice recognition computer software.   Although all attempts are made to edit the dictation for accuracy, there may be errors in the transcription that are not intended.

## 2022-08-09 NOTE — PATIENT INSTRUCTIONS
Labs before follow up in 3 months. Stop HCTZ 12.5mg. Begin maxide daily. Begin lexapro 10mg at bedtime. Continue buspar 10 mg as needed.

## 2022-08-10 DIAGNOSIS — I10 HYPERTENSION, ESSENTIAL, BENIGN: ICD-10-CM

## 2022-08-10 DIAGNOSIS — G40.909 SEIZURE DISORDER (HCC): ICD-10-CM

## 2022-08-10 DIAGNOSIS — E66.9 OBESITY, UNSPECIFIED CLASSIFICATION, UNSPECIFIED OBESITY TYPE, UNSPECIFIED WHETHER SERIOUS COMORBIDITY PRESENT: ICD-10-CM

## 2022-08-10 LAB
ALBUMIN SERPL-MCNC: 3.7 G/DL (ref 3.5–5.2)
ALP BLD-CCNC: 91 U/L (ref 35–104)
ALT SERPL-CCNC: 12 U/L (ref 5–33)
ANION GAP SERPL CALCULATED.3IONS-SCNC: 11 MMOL/L (ref 7–19)
AST SERPL-CCNC: 16 U/L (ref 5–32)
BACTERIA: NEGATIVE /HPF
BASOPHILS ABSOLUTE: 0 K/UL (ref 0–0.2)
BASOPHILS RELATIVE PERCENT: 0.3 % (ref 0–1)
BILIRUB SERPL-MCNC: 0.3 MG/DL (ref 0.2–1.2)
BILIRUBIN URINE: NEGATIVE
BLOOD, URINE: NEGATIVE
BUN BLDV-MCNC: 16 MG/DL (ref 6–20)
CALCIUM SERPL-MCNC: 9.4 MG/DL (ref 8.6–10)
CARBAMAZEPINE LEVEL: 2.9 UG/ML (ref 4–12)
CHLORIDE BLD-SCNC: 108 MMOL/L (ref 98–111)
CHOLESTEROL, TOTAL: 164 MG/DL (ref 160–199)
CLARITY: CLEAR
CO2: 25 MMOL/L (ref 22–29)
COLOR: YELLOW
CREAT SERPL-MCNC: 0.9 MG/DL (ref 0.5–0.9)
CRYSTALS, UA: ABNORMAL /HPF
EOSINOPHILS ABSOLUTE: 0.1 K/UL (ref 0–0.6)
EOSINOPHILS RELATIVE PERCENT: 1.8 % (ref 0–5)
EPITHELIAL CELLS, UA: 3 /HPF (ref 0–5)
GFR AFRICAN AMERICAN: >59
GFR NON-AFRICAN AMERICAN: >60
GLUCOSE BLD-MCNC: 96 MG/DL (ref 74–109)
GLUCOSE URINE: NEGATIVE MG/DL
HBA1C MFR BLD: 5.6 % (ref 4–6)
HCT VFR BLD CALC: 42.2 % (ref 37–47)
HDLC SERPL-MCNC: 50 MG/DL (ref 65–121)
HEMOGLOBIN: 13.7 G/DL (ref 12–16)
HYALINE CASTS: 0 /HPF (ref 0–8)
IMMATURE GRANULOCYTES #: 0 K/UL
KETONES, URINE: NEGATIVE MG/DL
LDL CHOLESTEROL CALCULATED: 104 MG/DL
LEUKOCYTE ESTERASE, URINE: ABNORMAL
LYMPHOCYTES ABSOLUTE: 2 K/UL (ref 1.1–4.5)
LYMPHOCYTES RELATIVE PERCENT: 32.2 % (ref 20–40)
MCH RBC QN AUTO: 29.5 PG (ref 27–31)
MCHC RBC AUTO-ENTMCNC: 32.5 G/DL (ref 33–37)
MCV RBC AUTO: 90.9 FL (ref 81–99)
MONOCYTES ABSOLUTE: 0.7 K/UL (ref 0–0.9)
MONOCYTES RELATIVE PERCENT: 11.7 % (ref 0–10)
NEUTROPHILS ABSOLUTE: 3.3 K/UL (ref 1.5–7.5)
NEUTROPHILS RELATIVE PERCENT: 53.5 % (ref 50–65)
NITRITE, URINE: NEGATIVE
PDW BLD-RTO: 13 % (ref 11.5–14.5)
PH UA: 8 (ref 5–8)
PLATELET # BLD: 232 K/UL (ref 130–400)
PMV BLD AUTO: 9.7 FL (ref 9.4–12.3)
POTASSIUM SERPL-SCNC: 4.6 MMOL/L (ref 3.5–5)
PROTEIN UA: NEGATIVE MG/DL
RBC # BLD: 4.64 M/UL (ref 4.2–5.4)
RBC UA: 0 /HPF (ref 0–4)
SODIUM BLD-SCNC: 144 MMOL/L (ref 136–145)
SPECIFIC GRAVITY UA: 1.01 (ref 1–1.03)
T4 FREE: 0.93 NG/DL (ref 0.93–1.7)
TOTAL PROTEIN: 7 G/DL (ref 6.6–8.7)
TRIGL SERPL-MCNC: 49 MG/DL (ref 0–149)
TSH SERPL DL<=0.05 MIU/L-ACNC: 1.53 UIU/ML (ref 0.27–4.2)
UROBILINOGEN, URINE: 0.2 E.U./DL
WBC # BLD: 6.1 K/UL (ref 4.8–10.8)
WBC UA: 2 /HPF (ref 0–5)

## 2022-08-27 DIAGNOSIS — K21.9 GASTROESOPHAGEAL REFLUX DISEASE, UNSPECIFIED WHETHER ESOPHAGITIS PRESENT: ICD-10-CM

## 2022-08-28 RX ORDER — PANTOPRAZOLE SODIUM 40 MG/1
TABLET, DELAYED RELEASE ORAL
Qty: 30 TABLET | Refills: 5 | Status: SHIPPED | OUTPATIENT
Start: 2022-08-28

## 2022-11-07 NOTE — TELEPHONE ENCOUNTER
Requested Prescriptions     Pending Prescriptions Disp Refills    carBAMazepine (CARBATROL) 100 MG extended release capsule [Pharmacy Med Name: CARBAMAZEPINE ER 100MG CP12] 360 capsule 2     Sig: TAKE TWO CAPSULES BY MOUTH TWO TIMES A DAY       Last Office Visit: 11/30/2021  Next Office Visit: 12/6/2022  Last Medication Refill:  11/30/21 with 3 RF

## 2022-11-08 RX ORDER — CARBAMAZEPINE 100 MG/1
CAPSULE, EXTENDED RELEASE ORAL
Qty: 360 CAPSULE | Refills: 3 | Status: SHIPPED | OUTPATIENT
Start: 2022-11-08

## 2022-11-15 ENCOUNTER — OFFICE VISIT (OUTPATIENT)
Dept: PRIMARY CARE CLINIC | Age: 57
End: 2022-11-15
Payer: COMMERCIAL

## 2022-11-15 VITALS
WEIGHT: 250 LBS | DIASTOLIC BLOOD PRESSURE: 80 MMHG | TEMPERATURE: 97.9 F | HEIGHT: 68 IN | HEART RATE: 78 BPM | OXYGEN SATURATION: 94 % | SYSTOLIC BLOOD PRESSURE: 138 MMHG | BODY MASS INDEX: 37.89 KG/M2

## 2022-11-15 DIAGNOSIS — Z63.8 FAMILY DISCORD: ICD-10-CM

## 2022-11-15 DIAGNOSIS — I10 HYPERTENSION, ESSENTIAL, BENIGN: Primary | ICD-10-CM

## 2022-11-15 DIAGNOSIS — K21.9 GASTROESOPHAGEAL REFLUX DISEASE, UNSPECIFIED WHETHER ESOPHAGITIS PRESENT: ICD-10-CM

## 2022-11-15 DIAGNOSIS — F41.9 ANXIETY: ICD-10-CM

## 2022-11-15 DIAGNOSIS — G40.909 SEIZURE DISORDER (HCC): ICD-10-CM

## 2022-11-15 PROCEDURE — 99214 OFFICE O/P EST MOD 30 MIN: CPT | Performed by: NURSE PRACTITIONER

## 2022-11-15 PROCEDURE — 3078F DIAST BP <80 MM HG: CPT | Performed by: NURSE PRACTITIONER

## 2022-11-15 PROCEDURE — 3074F SYST BP LT 130 MM HG: CPT | Performed by: NURSE PRACTITIONER

## 2022-11-15 RX ORDER — ESCITALOPRAM OXALATE 10 MG/1
10 TABLET ORAL EVERY EVENING
Qty: 90 TABLET | Refills: 1 | Status: SHIPPED | OUTPATIENT
Start: 2022-11-15 | End: 2023-02-13

## 2022-11-15 RX ORDER — TRIAMTERENE AND HYDROCHLOROTHIAZIDE 37.5; 25 MG/1; MG/1
1 TABLET ORAL DAILY
Qty: 90 TABLET | Refills: 1 | Status: SHIPPED | OUTPATIENT
Start: 2022-11-15

## 2022-11-15 RX ORDER — PANTOPRAZOLE SODIUM 40 MG/1
TABLET, DELAYED RELEASE ORAL
Qty: 90 TABLET | Refills: 1 | Status: SHIPPED | OUTPATIENT
Start: 2022-11-15

## 2022-11-15 SDOH — SOCIAL STABILITY - SOCIAL INSECURITY: OTHER SPECIFIED PROBLEMS RELATED TO PRIMARY SUPPORT GROUP: Z63.8

## 2022-11-15 ASSESSMENT — ENCOUNTER SYMPTOMS
EYE REDNESS: 0
SHORTNESS OF BREATH: 0
NAUSEA: 0
RHINORRHEA: 0
BLOOD IN STOOL: 0
SORE THROAT: 0
VOMITING: 0
CHEST TIGHTNESS: 0
COUGH: 0
VOICE CHANGE: 0
EYE ITCHING: 0
WHEEZING: 0
ABDOMINAL PAIN: 0
CONSTIPATION: 0
TROUBLE SWALLOWING: 0
DIARRHEA: 0

## 2022-11-15 NOTE — PROGRESS NOTES
[facility]  [address] [place] [address]  [id number]  [id number]  Dept: [phone]  Dept Fax: [phone]  Loc: [phone]        Karen Hsu is a 62 y.o. female who presents today for her medical conditions/ complaints as noted below. Karen Hsu is c/o 3 Month Follow-Up        Chief Complaint   Patient presents with    3 Month Follow-Up         HPI:     HPI    Anxiety:  11/15/2022: lexapro 10mg at bedtime, and buspar as needed. Doing well on this combo and wishes to continue same. Pt denies SI or HI.   8/9/2022: Patient started on BuSpar 10 mg 3 times daily for anxiety and crying spells at previous appointment. Believes that buspar is helping. 6/14/2022: family discord. Patient reports that her son is trying to finish his home that he has been remodeling for the last 3 years. She believes he is trying to rush through the renovations in effort to propose to his girlfriend. She states that she has been trying to paint and work on his house as well as a work a full-time job. Patient notes that her and her  are exhausted. She reports increased crying on and off. Eye problem contact dermatitis:   11/15/2022: resolved. 8/9/2022: Patient was prescribed prednisone for contact dermatitis of 5. Resolved per pt report. 6/14/2022: Pt reports raw area above eye, itchy skin above eye. Hypertension  11/15/2022: stable. B/p stable. 8/9/2022: Patient started on HCTZ 12.5 mg daily at previous appointment. Pt b/p still slightly elevated. Still also struggling with edema. GERD:   11/15/2022: protonix 40mg daily. Symptoms controlled. 8/9/2022: Current regimen includes Protonix 40 mg daily. Stable no issues. 6/14/2022: Stable per patient report  12/14/2021: much improved with protonix per pt report. Notes that nausea has resolved. Seizure Disorder:  11/15/2022: stable on current therapy, no issues.   8/9/2022: Current regimen includes carbamazepine 200 mg twice daily. Last carbamazepine level was 3.5 2021. Management of this medication along with seizures by Dr. Nikita Smiley. 2022: Stable per patient report  2021: stable. hx of seizures that have been well controlled on carbamazepine 200 mg twice daily. No issues. Pt is due for carbamazepine level to be drawn. Patient reports that they have been compliant with taking medications as directed.      Past Medical History:   Diagnosis Date    Anxiety 2022    Chronic bilateral low back pain 10/16/2021    Class 2 severe obesity with serious comorbidity and body mass index (BMI) of 36.0 to 36.9 in adult ([facility] [address] 10/16/2021    Gastroesophageal reflux disease 2021    Hypertension, essential, benign 2022    Seizures ([facility] [address]        Past Surgical History:   Procedure Laterality Date     SECTION      COLONOSCOPY N/A 1/15/2019    Dr Brenna Moore, 5 yr recall       Social History     Socioeconomic History    Marital status:      Spouse name: None    Number of children: None    Years of education: None    Highest education level: None   Tobacco Use    Smoking status: Former     Packs/day: 1.00     Years: 15.00     Pack years: 15.00     Types: Cigarettes    Smokeless tobacco: Never   Vaping Use    Vaping Use: Never used   Substance and Sexual Activity    Alcohol use: No    Drug use: No    Sexual activity: Yes       Family History   Problem Relation Age of Onset    High Blood Pressure Mother     Heart Disease Mother     High Blood Pressure Father     Heart Disease Father     Colon Cancer Father     Diabetes Maternal Grandmother        Current Outpatient Medications   Medication Sig Dispense Refill    escitalopram (LEXAPRO) 10 MG tablet Take 1 tablet by mouth every evening 90 tablet 1    pantoprazole (PROTONIX) 40 MG tablet TAKE ONE TABLET BY MOUTH EVERY MORNING BEFORE BREAKFAST 90 tablet 1    triamterene-hydroCHLOROthiazide (MAXZIDE-25) 37.5-25 MG per tablet Take 1 tablet by mouth daily 90 tablet 1    carBAMazepine (CARBATROL) 100 MG extended release capsule TAKE TWO CAPSULES BY MOUTH TWO TIMES A  capsule 3    VITAMIN D PO Take by mouth daily      conjugated estrogens (PREMARIN) 0.625 MG/GM vaginal cream Place 0.5 g vaginally Twice a Week 1 each 2    cetirizine (ZYRTEC ALLERGY) 10 MG tablet Take 1 tablet by mouth daily 30 tablet 0    ascorbic acid (VITAMIN C) 500 MG tablet Take 500 mg by mouth daily      calcium carbonate (OSCAL) 500 MG TABS tablet Take 500 mg by mouth daily      therapeutic multivitamin-minerals (THERAGRAN-M) tablet Take 1 tablet by mouth daily. Alum Hydroxide-Mag Carbonate (GAVISCON PO) Take by mouth nightly       No current facility-administered medications for this visit. Allergies   Allergen Reactions    Augmentin [Amoxicillin-Pot Clavulanate] Diarrhea     Severe diarrhea       Lab Review notapplicable    Subjective:   Review of Systems   Constitutional:  Negative for activity change, appetite change, fatigue, fever and unexpected weight change. HENT:  Negative for congestion, ear pain, nosebleeds, rhinorrhea, sore throat, trouble swallowing and voice change. Eyes:  Negative for redness, itching and visual disturbance. Respiratory:  Negative for cough, chest tightness, shortness of breath and wheezing. Cardiovascular:  Negative for chest pain, palpitations and leg swelling. Gastrointestinal:  Negative for abdominal pain, blood in stool, constipation, diarrhea, nausea and vomiting. Endocrine: Negative for polydipsia, polyphagia and polyuria. Genitourinary:  Negative for dysuria, frequency and urgency. Musculoskeletal:  Negative for myalgias. Skin:  Negative for rash and wound. Neurological:  Negative for dizziness, speech difficulty, light-headedness and headaches. Psychiatric/Behavioral:  Negative for agitation, confusion, self-injury and suicidal ideas. The patient is nervous/anxious. Objective:     Physical Exam  Vitals and nursing note reviewed. Constitutional:       General: She is not in acute distress. Appearance: She is well-developed. She is not diaphoretic. HENT:      Head: Normocephalic and atraumatic. Right Ear: External ear normal.      Left Ear: External ear normal.      Nose: Nose normal.      Mouth/Throat:      Pharynx: No oropharyngeal exudate. Eyes:      General:         Right eye: No discharge. Left eye: No discharge. Conjunctiva/sclera: Conjunctivae normal.      Pupils: Pupils are equal, round, and reactive to light. Cardiovascular:      Rate and Rhythm: Normal rate and regular rhythm. Heart sounds: Normal heart sounds. No murmur heard. Pulmonary:      Effort: Pulmonary effort is normal. No respiratory distress. Breath sounds: Normal breath sounds. No stridor. No wheezing or rales. Chest:      Chest wall: No tenderness. Breasts:     Right: No inverted nipple, mass, nipple discharge, skin change or tenderness. Left: No inverted nipple, mass, nipple discharge, skin change or tenderness. Abdominal:      General: Bowel sounds are normal. There is no distension. Palpations: Abdomen is soft. Tenderness: There is no abdominal tenderness. Musculoskeletal:         General: No tenderness or deformity. Normal range of motion. Cervical back: Normal range of motion and neck supple. Right lower leg: No edema. Left lower leg: No edema. Skin:     General: Skin is warm and dry. Findings: No erythema or rash. Neurological:      Mental Status: She is alert and oriented to person, place, and time. Cranial Nerves: No cranial nerve deficit. Coordination: Coordination normal.      Deep Tendon Reflexes: Reflexes are normal and symmetric. Psychiatric:         Thought Content:  Thought content normal.     /80 (Site: Left Upper Arm, Position: Sitting)   Pulse 78   Temp 97.9 °F (36.6 °C) (Temporal) Ht 5' 8\" (1.727 m)   Wt 250 lb (113.4 kg)   LMP 01/01/2018 (Within Days)   SpO2 94%   BMI 38.01 kg/m²     Assessment:      Diagnosis Orders   1. Hypertension, essential, benign  triamterene-hydroCHLOROthiazide (MAXZIDE-25) 37.5-25 MG per tablet      2. Anxiety  escitalopram (LEXAPRO) 10 MG tablet      3. Family discord  escitalopram (LEXAPRO) 10 MG tablet      4. Gastroesophageal reflux disease, unspecified whether esophagitis present  pantoprazole (PROTONIX) 40 MG tablet      5. Seizure disorder ([facility] [address]          No results found for this visit on 11/15/22. Plan:     1. Hypertension, essential, benign    2. Anxiety    3. Family discord    4. Gastroesophageal reflux disease, unspecified whether esophagitis present    5. Seizure disorder ([facility] [address]      Over 50% of the total visit time of 30 minutes was spent on counseling and or coordination of care of   HTN, anxiety, family discord, GERD, and seizure disorder. Return in about 6 months (around 5/15/2023). No orders of the defined types were placed in this encounter. Orders Placed This Encounter   Medications    escitalopram (LEXAPRO) 10 MG tablet     Sig: Take 1 tablet by mouth every evening     Dispense:  90 tablet     Refill:  1    pantoprazole (PROTONIX) 40 MG tablet     Sig: TAKE ONE TABLET BY MOUTH EVERY MORNING BEFORE BREAKFAST     Dispense:  90 tablet     Refill:  1    triamterene-hydroCHLOROthiazide (MAXZIDE-25) 37.5-25 MG per tablet     Sig: Take 1 tablet by mouth daily     Dispense:  90 tablet     Refill:  1           There are no Patient Instructions on file for this visit.      /family given educational materials - see patient instructions. Discussed use, benefit, and side effects of prescribed medications. All patient/family questions answered and voiced understanding. Instructed to continue current medications, diet and exercise. Pt/family agreed with treatment plan. Follow up as directed and sooner if needed.      Patient/ family instructed that is symptoms worsen or persist they are to contact office or report to nearest ER. They voice understanding and agreement with this plan.   signed by EDELMIRA Dacosta on 11/18/2022 at 9:35 AM CDT    This dictation was generated by voice recognition computer software. Although all attempts are made to edit the dictation for accuracy, there may be errors in the transcription that are not intended.

## 2022-11-18 ENCOUNTER — OFFICE VISIT (OUTPATIENT)
Dept: OBGYN CLINIC | Age: 57
End: 2022-11-18
Payer: COMMERCIAL

## 2022-11-18 VITALS
SYSTOLIC BLOOD PRESSURE: 142 MMHG | DIASTOLIC BLOOD PRESSURE: 79 MMHG | HEIGHT: 68 IN | WEIGHT: 248 LBS | BODY MASS INDEX: 37.59 KG/M2 | HEART RATE: 89 BPM

## 2022-11-18 DIAGNOSIS — Z01.419 WOMEN'S ANNUAL ROUTINE GYNECOLOGICAL EXAMINATION: Primary | ICD-10-CM

## 2022-11-18 DIAGNOSIS — Z12.4 SCREENING FOR CERVICAL CANCER: ICD-10-CM

## 2022-11-18 DIAGNOSIS — Z12.31 ENCOUNTER FOR SCREENING MAMMOGRAM FOR MALIGNANT NEOPLASM OF BREAST: ICD-10-CM

## 2022-11-18 DIAGNOSIS — Z12.39 SCREENING BREAST EXAMINATION: ICD-10-CM

## 2022-11-18 DIAGNOSIS — N95.2 VAGINAL ATROPHY: ICD-10-CM

## 2022-11-18 PROCEDURE — 99396 PREV VISIT EST AGE 40-64: CPT | Performed by: NURSE PRACTITIONER

## 2022-11-18 PROCEDURE — 3074F SYST BP LT 130 MM HG: CPT | Performed by: NURSE PRACTITIONER

## 2022-11-18 PROCEDURE — 3078F DIAST BP <80 MM HG: CPT | Performed by: NURSE PRACTITIONER

## 2022-11-18 ASSESSMENT — ENCOUNTER SYMPTOMS
ALLERGIC/IMMUNOLOGIC NEGATIVE: 1
CONSTIPATION: 0
GASTROINTESTINAL NEGATIVE: 1
DIARRHEA: 0
RESPIRATORY NEGATIVE: 1
EYES NEGATIVE: 1

## 2022-11-18 NOTE — PROGRESS NOTES
Laxmi Galvez is a 62 y.o. female who presents today for her medical conditions/ complaints as noted below. Laxmi Galvez is c/o of Annual Exam        HPI  Pt presents for annual exam and pap smear. Needs order for screening mammogram. PCP draws labs and manages meds. Has not been needing to use vaginal estrogen, no issues with dryness at this time. Mammo:12/10/2021  Pap smear:  Contraception:Postmenopausal     P:2  Ab:0  Bone density:NA   Colonoscopy:2019  Patient's last menstrual period was 2018 (within days).   [id number]    Past Medical History:   Diagnosis Date    Anxiety 2022    Chronic bilateral low back pain 10/16/2021    Class 2 severe obesity with serious comorbidity and body mass index (BMI) of 36.0 to 36.9 in adult ([facility] [address] 10/16/2021    Gastroesophageal reflux disease 2021    Hypertension, essential, benign 2022    Seizures ([facility] [address]      Past Surgical History:   Procedure Laterality Date     SECTION      COLONOSCOPY N/A 1/15/2019    Dr Mulu Lauren, 5 yr recall     Family History   Problem Relation Age of Onset    High Blood Pressure Mother     Heart Disease Mother     High Blood Pressure Father     Heart Disease Father     Colon Cancer Father     Diabetes Maternal Grandmother      Social History     Tobacco Use    Smoking status: Former     Packs/day: 1.00     Years: 15.00     Pack years: 15.00     Types: Cigarettes    Smokeless tobacco: Never   Substance Use Topics    Alcohol use: No       Current Outpatient Medications   Medication Sig Dispense Refill    escitalopram (LEXAPRO) 10 MG tablet Take 1 tablet by mouth every evening 90 tablet 1    pantoprazole (PROTONIX) 40 MG tablet TAKE ONE TABLET BY MOUTH EVERY MORNING BEFORE BREAKFAST 90 tablet 1    triamterene-hydroCHLOROthiazide (MAXZIDE-25) 37.5-25 MG per tablet Take 1 tablet by mouth daily 90 tablet 1    carBAMazepine (CARBATROL) 100 MG extended release capsule TAKE TWO CAPSULES BY MOUTH TWO TIMES A  capsule 3 VITAMIN D PO Take by mouth daily      Alum Hydroxide-Mag Carbonate (GAVISCON PO) Take by mouth nightly      conjugated estrogens (PREMARIN) 0.625 MG/GM vaginal cream Place 0.5 g vaginally Twice a Week 1 each 2    cetirizine (ZYRTEC ALLERGY) 10 MG tablet Take 1 tablet by mouth daily 30 tablet 0    ascorbic acid (VITAMIN C) 500 MG tablet Take 500 mg by mouth daily      calcium carbonate (OSCAL) 500 MG TABS tablet Take 500 mg by mouth daily      therapeutic multivitamin-minerals (THERAGRAN-M) tablet Take 1 tablet by mouth daily. No current facility-administered medications for this visit. Allergies   Allergen Reactions    Augmentin [Amoxicillin-Pot Clavulanate] Diarrhea     Severe diarrhea     Vitals:    11/18/22 0904   BP: (!) 142/79   Pulse: 89     Body mass index is 37.71 kg/m². Review of Systems   Constitutional: Negative. HENT: Negative. Eyes: Negative. Respiratory: Negative. Cardiovascular: Negative. Gastrointestinal: Negative. Negative for constipation and diarrhea. Endocrine: Negative. Genitourinary: Negative. Negative for frequency, menstrual problem and urgency. Musculoskeletal: Negative. Skin: Negative. Allergic/Immunologic: Negative. Neurological: Negative. Hematological: Negative. Psychiatric/Behavioral: Negative. All other systems reviewed and are negative. Physical Exam  Vitals and nursing note reviewed. Constitutional:       Appearance: She is well-developed. HENT:      Head: Normocephalic. Neck:      Thyroid: No thyroid mass or thyromegaly. Cardiovascular:      Rate and Rhythm: Normal rate and regular rhythm. Pulmonary:      Effort: Pulmonary effort is normal.      Breath sounds: Normal breath sounds. Chest:   Breasts:     Right: No inverted nipple, mass, nipple discharge or skin change. Left: No inverted nipple, mass, nipple discharge or skin change. Abdominal:      Palpations: Abdomen is soft. There is no mass. Tenderness: There is no abdominal tenderness. Genitourinary:     General: Normal vulva. Vagina: Normal.      Cervix: No cervical motion tenderness. Uterus: Normal. Not enlarged. Adnexa:         Right: No mass or tenderness. Left: No mass or tenderness. Comments: Pap collected  Musculoskeletal:         General: Normal range of motion. Cervical back: Normal range of motion and neck supple. Skin:     General: Skin is warm and dry. Neurological:      Mental Status: She is alert and oriented to person, place, and time. Psychiatric:         Attention and Perception: Attention normal.         Mood and Affect: Mood normal.         Speech: Speech normal.         Behavior: Behavior normal.         Thought Content: Thought content normal.         Cognition and Memory: Cognition normal.         Judgment: Judgment normal.        Diagnosis Orders   1. Women's annual routine gynecological examination        2. Screening for cervical cancer  PAP SMEAR    Human papillomavirus (HPV) DNA probe thin prep high risk      3. Encounter for screening mammogram for malignant neoplasm of breast  GEOVANNA DIGITAL SCREEN W OR WO CAD BILATERAL      4. Vaginal atrophy            MEDICATIONS:  No orders of the defined types were placed in this encounter. ORDERS:  Orders Placed This Encounter   Procedures    GEOVANNA DIGITAL SCREEN W OR WO CAD BILATERAL    PAP SMEAR    Human papillomavirus (HPV) DNA probe thin prep high risk       PLAN:  Pap collected  Ordered screening mammogram    Patient Instructions   Patient Education       Breast Self-Exam: Care Instructions  Your Care Instructions     A breast self-exam is when you check your breasts for lumps or changes. This regular exam helps you learn how your breasts normally look and feel. Most breast problems or changes are not because of cancer.   Breast self-exam is not a substitute for a mammogram. Having regular breast exams by your doctor and regular mammograms improve your chances of finding any problems with your breasts. Some women set a time each month to do a step-by-step breast self-exam. Other women like a less formal system. They might look at their breasts as they brush their teeth, or feel their breasts once in a while in the shower. If you notice a change in your breast, tell your doctor. Follow-up care is a key part of your treatment and safety. Be sure to make and go to all appointments, and call your doctor if you are having problems. It's also a good idea to know your test results and keep a list of the medicines you take. How do you do a breast self-exam?  The best time to examine your breasts is usually one week after your menstrual period begins. Your breasts should not be tender then. If you do not have periods, you might do your exam on a day of the month that is easy to remember. To examine your breasts:  Remove all your clothes above the waist and lie down. When you are lying down, your breast tissue spreads evenly over your chest wall, which makes it easier to feel all your breast tissue. Use the pads--not the fingertips--of the 3 middle fingers of your left hand to check your right breast. Move your fingers slowly in small coin-sized circles that overlap. Use three levels of pressure to feel of all your breast tissue. Use light pressure to feel the tissue close to the skin surface. Use medium pressure to feel a little deeper. Use firm pressure to feel your tissue close to your breastbone and ribs. Use each pressure level to feel your breast tissue before moving on to the next spot. Check your entire breast, moving up and down as if following a strip from the collarbone to the bra line, and from the armpit to the ribs. Repeat until you have covered the entire breast.  Repeat this procedure for your left breast, using the pads of the 3 middle fingers of your right hand.   To examine your breasts while in the shower:  Place one arm over your head and lightly soap your breast on that side. Using the pads of your fingers, gently move your hand over your breast (in the strip pattern described above), feeling carefully for any lumps or changes. Repeat for the other breast.  Have your doctor inspect anything you notice to see if you need further testing. Where can you learn more? Go to [email or web address]://[email or web address].[email or web address] org and sign in to your ITegris account. Enter P148 in the MoveInSync box to learn more about \"Breast Self-Exam: Care Instructions. \"     If you do not have an account, please click on the \"Sign Up Now\" link. Current as of: November 22, 2021               Content Version: 13.4  © 2006-2022 Healthwise, Incorporated. Care instructions adapted under license by [facility] ([facility]). If you have questions about a medical condition or this instruction, always ask your healthcare professional. [address] any warranty or liability for your use of this information.

## 2022-11-18 NOTE — PROGRESS NOTES
Pt presents today for pap smear and breast exam.      Mammo:12/10/2021  Pap smear:  Contraception:Postmenopausal     P:2  Ab:0  Bone density:NA   Colonoscopy:2019

## 2022-11-18 NOTE — PATIENT INSTRUCTIONS
Patient Education        Breast Self-Exam: Care Instructions  Your Care Instructions     A breast self-exam is when you check your breasts for lumps or changes. This regular exam helps you learn how your breasts normally look and feel. Most breast problems or changes are not because of cancer. Breast self-exam is not a substitute for a mammogram. Having regular breast exams by your doctor and regular mammograms improve your chances of finding any problems with your breasts. Some women set a time each month to do a step-by-step breast self-exam. Other women like a less formal system. They might look at their breasts as they brush their teeth, or feel their breasts once in a while in the shower. If you notice a change in your breast, tell your doctor. Follow-up care is a key part of your treatment and safety. Be sure to make and go to all appointments, and call your doctor if you are having problems. It's also a good idea to know your test results and keep a list of the medicines you take. How do you do a breast self-exam?  The best time to examine your breasts is usually one week after your menstrual period begins. Your breasts should not be tender then. If you do not have periods, you might do your exam on a day of the month that is easy to remember. To examine your breasts:  Remove all your clothes above the waist and lie down. When you are lying down, your breast tissue spreads evenly over your chest wall, which makes it easier to feel all your breast tissue. Use the pads--not the fingertips--of the 3 middle fingers of your left hand to check your right breast. Move your fingers slowly in small coin-sized circles that overlap. Use three levels of pressure to feel of all your breast tissue. Use light pressure to feel the tissue close to the skin surface. Use medium pressure to feel a little deeper. Use firm pressure to feel your tissue close to your breastbone and ribs.  Use each pressure level to feel your Situation: Contacted pt for enrollment into [facility] program.       Key Assessments: Pt agreeable.      Pt is finding the Flonase and Clariten to help with seasonal allergies.  Pt does occasionally get a runny nose.      Pt's  checks her BS QID.  He keeps an accurate daily log of her readings.  He reported her lowest BS reading this week as 112 (yesterday at dinner) and her highest reading was Monday at 258 (dinner time).  Reports no hypo/hyperglycemic reaction.     Actions Taken: Reviewed RN[facility] program goals. Discussed RN[facility] program duration and communication expectations.  Provided pt with RN[facility] contact information. Reviewed s/s that warrant a call to PCP/RNCC.  Pt currently without any s/s at this time.  Pt managing well at home. Completed assessment.      Pt is stable with Seasonal allergies.  If worsens to contact PCP/RNCC.     BS has been stable this week.   commended for the great job of documentation.      Pt advised next week will ask more survey questions and would like to review her medication list to ensure our chart medication list is accurate.  Pt agreed.     Plan: Will contact pt in one week for RN[facility] outreach call.  Will attempt to complete: general assessment,  medication reconciliation, social determinants, IRA/PHQ/2, disease process and establish a goal.        See hyperlinks within encounter for full documentation   breast tissue before moving on to the next spot. Check your entire breast, moving up and down as if following a strip from the collarbone to the bra line, and from the armpit to the ribs. Repeat until you have covered the entire breast.  Repeat this procedure for your left breast, using the pads of the 3 middle fingers of your right hand. To examine your breasts while in the shower:  Place one arm over your head and lightly soap your breast on that side. Using the pads of your fingers, gently move your hand over your breast (in the strip pattern described above), feeling carefully for any lumps or changes. Repeat for the other breast.  Have your doctor inspect anything you notice to see if you need further testing. Where can you learn more? Go to [email or web address]://[email or web address].[email or web address] org and sign in to your Ultromex account. Enter P148 in the MediGain box to learn more about \"Breast Self-Exam: Care Instructions. \"     If you do not have an account, please click on the \"Sign Up Now\" link. Current as of: November 22, 2021               Content Version: 13.4  © 6791-6391 Healthwise, Incorporated. Care instructions adapted under license by [facility] ([facility]). If you have questions about a medical condition or this instruction, always ask your healthcare professional. [address]  any warranty or liability for your use of this information.

## 2022-12-03 DIAGNOSIS — F41.9 ANXIETY: ICD-10-CM

## 2022-12-03 DIAGNOSIS — Z63.8 FAMILY DISCORD: ICD-10-CM

## 2022-12-03 DIAGNOSIS — I10 HYPERTENSION, ESSENTIAL, BENIGN: ICD-10-CM

## 2022-12-03 SDOH — SOCIAL STABILITY - SOCIAL INSECURITY: OTHER SPECIFIED PROBLEMS RELATED TO PRIMARY SUPPORT GROUP: Z63.8

## 2022-12-05 ENCOUNTER — TELEPHONE (OUTPATIENT)
Dept: NEUROLOGY | Age: 57
End: 2022-12-05

## 2022-12-05 RX ORDER — ESCITALOPRAM OXALATE 10 MG/1
TABLET ORAL
Qty: 90 TABLET | Refills: 1 | Status: SHIPPED | OUTPATIENT
Start: 2022-12-05

## 2022-12-05 RX ORDER — TRIAMTERENE AND HYDROCHLOROTHIAZIDE 37.5; 25 MG/1; MG/1
TABLET ORAL
Qty: 90 TABLET | Refills: 1 | Status: SHIPPED | OUTPATIENT
Start: 2022-12-05

## 2022-12-05 NOTE — TELEPHONE ENCOUNTER
Called and spoke with patient to let her know that her appointment for 12-06-22 with Dr. Mireille Long had to be rescheduled due to the provider is out sick. Patient is aware of when I have her appointment rescheduled too.

## 2022-12-12 ENCOUNTER — HOSPITAL ENCOUNTER (OUTPATIENT)
Dept: WOMENS IMAGING | Age: 57
Discharge: HOME | End: 2022-12-12
Payer: COMMERCIAL

## 2022-12-12 DIAGNOSIS — Z12.31 SCREENING MAMMOGRAM FOR BREAST CANCER: ICD-10-CM

## 2022-12-12 DIAGNOSIS — Z12.31 ENCOUNTER FOR SCREENING MAMMOGRAM FOR MALIGNANT NEOPLASM OF BREAST: ICD-10-CM

## 2022-12-12 PROCEDURE — 77063 BREAST TOMOSYNTHESIS BI: CPT

## 2022-12-12 PROCEDURE — 77067 SCR MAMMO BI INCL CAD: CPT

## 2023-01-24 ENCOUNTER — OFFICE VISIT (OUTPATIENT)
Dept: NEUROLOGY | Age: 58
End: 2023-01-24
Payer: COMMERCIAL

## 2023-01-24 VITALS
SYSTOLIC BLOOD PRESSURE: 133 MMHG | OXYGEN SATURATION: 99 % | HEIGHT: 68 IN | HEART RATE: 79 BPM | BODY MASS INDEX: 36.98 KG/M2 | DIASTOLIC BLOOD PRESSURE: 78 MMHG | WEIGHT: 244 LBS

## 2023-01-24 DIAGNOSIS — G40.909 SEIZURE DISORDER (HCC): Primary | ICD-10-CM

## 2023-01-24 PROCEDURE — 99213 OFFICE O/P EST LOW 20 MIN: CPT | Performed by: PSYCHIATRY & NEUROLOGY

## 2023-01-24 PROCEDURE — 3078F DIAST BP <80 MM HG: CPT | Performed by: PSYCHIATRY & NEUROLOGY

## 2023-01-24 PROCEDURE — 3075F SYST BP GE 130 - 139MM HG: CPT | Performed by: PSYCHIATRY & NEUROLOGY

## 2023-01-24 NOTE — PROGRESS NOTES
[id number] [facility]  [address] [place] [address] Drive, [address]  [place], [address]  Phone [phone]  Fax [phone] [address] [facility] Neurology Follow Up Encounter  23 10:04 AM CST    Information:   Patient Name: Caridad Jewell  :   1965  Age:   62 y.o. MRN:   [id number]  Account #:  [de-identified]  Today:  23    Provider: Balbina Hill M.D. Chief Complaint:   Chief Complaint   Patient presents with    Follow-up     Patient is her for a one year seizure disorder follow up. Seizures       Subjective:   Caridad Jewell is a 62 y.o. woman with a history of seizures who is following up. She started having seizures about 20 years ago. After going seizure free for a couple years, we tapered her off Tegretol and she had another complex partial seizure. Her last EEG was in  and that showed right frontal spike. She is doing well on a low dose of Tegretol. She had labs done this summer. She has had no seizures in years. She tolerates the Tegretol. Objective:     Past Medical History:  Past Medical History:   Diagnosis Date    Anxiety 2022    Chronic bilateral low back pain 10/16/2021    Class 2 severe obesity with serious comorbidity and body mass index (BMI) of 36.0 to 36.9 in adult [facility]) 10/16/2021    Gastroesophageal reflux disease 2021    Hypertension, essential, benign 2022    Seizures ([facility] [address]        Past Surgical History:   Procedure Laterality Date     SECTION      COLONOSCOPY N/A 1/15/2019    Dr Trish Adams, 5 yr recall       Recent Hospitalizations  None    Significant Injuries  None    Habits  Caridad Jewell reports that she has quit smoking. Her smoking use included cigarettes. She has a 15.00 pack-year smoking history. She has never used smokeless tobacco. She reports that she does not drink alcohol and does not use drugs.     Family History   Problem Relation Age of Onset    High Blood Pressure Mother     Heart Disease Mother     High Blood Pressure Father Heart Disease Father     Colon Cancer Father     Diabetes Maternal Grandmother        Social History  Reagan Hernandez is , lives in [place], [place], and is an RN at [employer]. Medications:  Current Outpatient Medications   Medication Sig Dispense Refill    triamterene-hydroCHLOROthiazide (MAXZIDE-25) 37.5-25 MG per tablet TAKE ONE TABLET BY MOUTH EVERY MORNING 90 tablet 1    escitalopram (LEXAPRO) 10 MG tablet TAKE ONE TABLET BY MOUTH EVERY EVENING 90 tablet 1    pantoprazole (PROTONIX) 40 MG tablet TAKE ONE TABLET BY MOUTH EVERY MORNING BEFORE BREAKFAST 90 tablet 1    carBAMazepine (CARBATROL) 100 MG extended release capsule TAKE TWO CAPSULES BY MOUTH TWO TIMES A  capsule 3    VITAMIN D PO Take by mouth daily      Alum Hydroxide-Mag Carbonate (GAVISCON PO) Take by mouth nightly      conjugated estrogens (PREMARIN) 0.625 MG/GM vaginal cream Place 0.5 g vaginally Twice a Week 1 each 2    cetirizine (ZYRTEC ALLERGY) 10 MG tablet Take 1 tablet by mouth daily 30 tablet 0    ascorbic acid (VITAMIN C) 500 MG tablet Take 500 mg by mouth daily      calcium carbonate (OSCAL) 500 MG TABS tablet Take 500 mg by mouth daily      therapeutic multivitamin-minerals (THERAGRAN-M) tablet Take 1 tablet by mouth daily. No current facility-administered medications for this visit. Allergies:   Allergies as of 01/24/2023 - Fully Reviewed 11/18/2022   Allergen Reaction Noted    Augmentin [amoxicillin-pot clavulanate] Diarrhea 07/30/2018       Review of Systems:  Constitutional: negative for - chills and fever  Eyes:  negative for - visual disturbance and photophobia  HENMT: negative for - headaches and sinus pain  Respiratory: negative for - cough, hemoptysis, and shortness of breath  Cardiovascular: negative for - chest pain and palpitations  Gastrointestinal: negative for - blood in stools, constipation, diarrhea, nausea, and vomiting  Genito-Urinary: negative for - hematuria, urinary frequency, urinary urgency, and urinary retention  Musculoskeletal: positive for - joint pain, joint stiffness  Hematological and Lymphatic: negative for - bleeding problems, abnormal bruising, and swollen lymph nodes  Endocrine:  negative for - polydipsia and polyphagia  Allergy/Immunology:  negative for - rhinorrhea, sinus congestion, hives  Integument:  negative for - negative for - rash, change in moles, new or changing lesions  Psychological: negative for - anxiety and depression  Neurological: negative for - memory loss, numbness/tingling, and weakness     PHYSICAL EXAMINATION:  Vitals:  /78   Pulse 79   Ht 5' 8\" (1.727 m)   Wt 244 lb (110.7 kg)   LMP 01/01/2018 (Within Days)   SpO2 99%   BMI 37.10 kg/m²   General appearance:  Alert, well developed, well nourished, in no distress  HEENT:  normocephalic, atraumatic, sclera appear normal, no nasal abnormalities, no rhinorrhea, Ears appear normal, oral mucous membranes are moist without erythema, trachea midline, thyroid is normal, no lymphadenopathy or neck mass. Cardiovascular:  Regular rate and rhythm without murmer. No peripheral edema, No cyanosis or clubbing. No carotid bruits. Pulmonary:  Lungs are clear to auscultation. Breathing appears normal, good expansion, normal effort without use of accessory muscles  Musculoskeletal:  Joints are normal.    Integument:  No rash, erythema, or pallor  Psychiatric:  Mood, affect, and behavior appear normal      NEUROLOGIC EXAMINATION:  Mental Status:  alert, oriented to person, place, and time. Speech:  Clear without dysarthria or dysphonia  Language:  Fluent without aphasia  Cranial Nerves:   II Visual fields are full to confrontation   III,IV, VI Extraocular movements are full   VII Facial movements are symmetrical without weakness   VIII Hearing is intact   IX,X Shoulder shrug and head rotation strength are intact   XII No tongue atrophy or fasciculations. Normal tongue protrusion.   No tongue weakness  Motor:  Normal strength in both upper and lower extremities. Normal muscle tone and bulk. Deep tendon reflexes are intact and symmetrical in both upper and lower extremities. Lee's signs are absent bilaterally. There is no ankle clonus on either side. Sensation:  Sensation is intact to light touch, temperature, and vibration in all extremities. Coordination:  Rapid alternating movements are normal in both upper and lower extremities. Finger to nose testing is unimpaired bilaterally. Gait:  Normal station and gait. Pertinent Diagnostic Studies:  Labs 8/2022 were good. Assessment:       ICD-10-CM    1. Seiz[facility] disorder ([facility]  G40.909       Given the previous failure for tapering off anticonvulsants and her abnormal EEG years later, I do not believe trying to get her off the Tegretol would be successful and there would be a high risk for recurrent seizures. Plan:   1. Continue Carbatrol 200 mg BID  2. Call with any seizure  3.          FU in a year    Electronically signed by Chas Restrepo MD on 1/24/23

## 2023-01-31 RX ORDER — METFORMIN HYDROCHLORIDE 500 MG/1
TABLET, EXTENDED RELEASE ORAL
Qty: 90 TABLET | Refills: 1 | OUTPATIENT
Start: 2023-01-31

## 2023-04-28 SDOH — HEALTH STABILITY: PHYSICAL HEALTH: ON AVERAGE, HOW MANY MINUTES DO YOU ENGAGE IN EXERCISE AT THIS LEVEL?: 30 MIN

## 2023-04-28 SDOH — HEALTH STABILITY: PHYSICAL HEALTH: ON AVERAGE, HOW MANY DAYS PER WEEK DO YOU ENGAGE IN MODERATE TO STRENUOUS EXERCISE (LIKE A BRISK WALK)?: 3 DAYS

## 2023-04-28 ASSESSMENT — SOCIAL DETERMINANTS OF HEALTH (SDOH)

## 2023-05-01 ENCOUNTER — OFFICE VISIT (OUTPATIENT)
Dept: PRIMARY CARE CLINIC | Age: 58
End: 2023-05-01
Payer: COMMERCIAL

## 2023-05-01 VITALS
BODY MASS INDEX: 35.77 KG/M2 | SYSTOLIC BLOOD PRESSURE: 130 MMHG | DIASTOLIC BLOOD PRESSURE: 66 MMHG | HEART RATE: 94 BPM | OXYGEN SATURATION: 98 % | TEMPERATURE: 97.7 F | WEIGHT: 236 LBS | HEIGHT: 68 IN

## 2023-05-01 DIAGNOSIS — G40.909 SEIZURE DISORDER (HCC): ICD-10-CM

## 2023-05-01 DIAGNOSIS — E66.01 SEVERE OBESITY (BMI 35.0-39.9) WITH COMORBIDITY (HCC): ICD-10-CM

## 2023-05-01 DIAGNOSIS — I10 HYPERTENSION, ESSENTIAL, BENIGN: ICD-10-CM

## 2023-05-01 DIAGNOSIS — Z76.89 ENCOUNTER TO ESTABLISH CARE: Primary | ICD-10-CM

## 2023-05-01 DIAGNOSIS — K21.9 GASTROESOPHAGEAL REFLUX DISEASE WITHOUT ESOPHAGITIS: ICD-10-CM

## 2023-05-01 PROBLEM — R03.0 BORDERLINE HYPERTENSION: Status: RESOLVED | Noted: 2017-01-03 | Resolved: 2023-05-01

## 2023-05-01 PROBLEM — F41.9 ANXIETY: Status: RESOLVED | Noted: 2022-06-17 | Resolved: 2023-05-01

## 2023-05-01 PROCEDURE — 3078F DIAST BP <80 MM HG: CPT | Performed by: NURSE PRACTITIONER

## 2023-05-01 PROCEDURE — 99214 OFFICE O/P EST MOD 30 MIN: CPT | Performed by: NURSE PRACTITIONER

## 2023-05-01 PROCEDURE — 3074F SYST BP LT 130 MM HG: CPT | Performed by: NURSE PRACTITIONER

## 2023-05-01 RX ORDER — PANTOPRAZOLE SODIUM 40 MG/1
TABLET, DELAYED RELEASE ORAL
Qty: 90 TABLET | Refills: 3 | Status: SHIPPED | OUTPATIENT
Start: 2023-05-01

## 2023-05-01 RX ORDER — TRIAMTERENE AND HYDROCHLOROTHIAZIDE 37.5; 25 MG/1; MG/1
1 TABLET ORAL EVERY MORNING
Qty: 90 TABLET | Refills: 3 | Status: SHIPPED | OUTPATIENT
Start: 2023-05-01

## 2023-05-01 SDOH — ECONOMIC STABILITY: INCOME INSECURITY: HOW HARD IS IT FOR YOU TO PAY FOR THE VERY BASICS LIKE FOOD, HOUSING, MEDICAL CARE, AND HEATING?: NOT HARD AT ALL

## 2023-05-01 SDOH — ECONOMIC STABILITY: HOUSING INSECURITY
IN THE LAST 12 MONTHS, WAS THERE A TIME WHEN YOU DID NOT HAVE A STEADY PLACE TO SLEEP OR SLEPT IN A SHELTER (INCLUDING NOW)?: NO

## 2023-05-01 SDOH — ECONOMIC STABILITY: FOOD INSECURITY: WITHIN THE PAST 12 MONTHS, YOU WORRIED THAT YOUR FOOD WOULD RUN OUT BEFORE YOU GOT MONEY TO BUY MORE.: NEVER TRUE

## 2023-05-01 SDOH — ECONOMIC STABILITY: FOOD INSECURITY: WITHIN THE PAST 12 MONTHS, THE FOOD YOU BOUGHT JUST DIDN'T LAST AND YOU DIDN'T HAVE MONEY TO GET MORE.: NEVER TRUE

## 2023-05-01 ASSESSMENT — PATIENT HEALTH QUESTIONNAIRE - PHQ9
SUM OF ALL RESPONSES TO PHQ QUESTIONS 1-9: 0
2. FEELING DOWN, DEPRESSED OR HOPELESS: 0
1. LITTLE INTEREST OR PLEASURE IN DOING THINGS: 0
SUM OF ALL RESPONSES TO PHQ9 QUESTIONS 1 & 2: 0

## 2023-05-01 NOTE — PROGRESS NOTES
vaginal cream Place 0.5 g vaginally Twice a Week (Patient taking differently: Place 0.5 g vaginally Twice a Week As needed) 1 each 2    cetirizine (ZYRTEC ALLERGY) 10 MG tablet Take 1 tablet by mouth daily (Patient taking differently: Take 1 tablet by mouth daily As needed) 30 tablet 0    ascorbic acid (VITAMIN C) 500 MG tablet Take 1 tablet by mouth daily      calcium carbonate (OSCAL) 500 MG TABS tablet Take 1 tablet by mouth daily      therapeutic multivitamin-minerals (THERAGRAN-M) tablet Take 1 tablet by mouth daily       No current facility-administered medications for this visit. Allergies   Allergen Reactions    Augmentin [Amoxicillin-Pot Clavulanate] Diarrhea     Severe diarrhea       Family History   Problem Relation Age of Onset    High Blood Pressure Mother     Heart Disease Mother     High Blood Pressure Father     Heart Disease Father     Colon Cancer Father     Diabetes Maternal Grandmother                Subjective:      Review of Systems   Constitutional:  Negative for activity change and fever. HENT:  Negative for congestion, ear pain and sore throat. Respiratory:  Negative for cough, chest tightness and shortness of breath. Cardiovascular:  Negative for chest pain. Gastrointestinal:  Negative for abdominal pain, diarrhea, nausea and vomiting. Genitourinary:  Negative for frequency and urgency. Musculoskeletal:  Negative for arthralgias and myalgias. Skin:  Negative for color change. Neurological:  Negative for dizziness, weakness and numbness. Psychiatric/Behavioral:  Negative for agitation. The patient is not nervous/anxious. Objective:     Physical Exam  Vitals reviewed. Constitutional:       Appearance: Normal appearance. HENT:      Head: Normocephalic. Right Ear: Tympanic membrane normal.      Left Ear: Tympanic membrane normal.      Nose: Nose normal.      Mouth/Throat:      Mouth: Mucous membranes are moist.      Pharynx: Oropharynx is clear.    Eyes:

## 2023-05-07 ASSESSMENT — ENCOUNTER SYMPTOMS
COUGH: 0
SHORTNESS OF BREATH: 0
DIARRHEA: 0
COLOR CHANGE: 0
CHEST TIGHTNESS: 0
NAUSEA: 0
ABDOMINAL PAIN: 0
SORE THROAT: 0
VOMITING: 0

## 2023-07-21 DIAGNOSIS — I10 HYPERTENSION, ESSENTIAL, BENIGN: ICD-10-CM

## 2023-07-24 RX ORDER — TRIAMTERENE AND HYDROCHLOROTHIAZIDE 37.5; 25 MG/1; MG/1
1 TABLET ORAL EVERY MORNING
Qty: 90 TABLET | Refills: 3 | Status: SHIPPED | OUTPATIENT
Start: 2023-07-24

## 2023-09-17 ENCOUNTER — HOSPITAL ENCOUNTER (EMERGENCY)
Age: 58
Discharge: HOME | End: 2023-09-17
Attending: EMERGENCY MEDICINE
Payer: COMMERCIAL

## 2023-09-17 VITALS
WEIGHT: 228 LBS | BODY MASS INDEX: 34.56 KG/M2 | HEIGHT: 68 IN | RESPIRATION RATE: 19 BRPM | OXYGEN SATURATION: 98 % | SYSTOLIC BLOOD PRESSURE: 152 MMHG | HEART RATE: 89 BPM | TEMPERATURE: 98.3 F | DIASTOLIC BLOOD PRESSURE: 71 MMHG

## 2023-09-17 DIAGNOSIS — R21 RASH AND OTHER NONSPECIFIC SKIN ERUPTION: Primary | ICD-10-CM

## 2023-09-17 PROCEDURE — 99283 EMERGENCY DEPT VISIT LOW MDM: CPT

## 2023-09-17 PROCEDURE — 6370000000 HC RX 637 (ALT 250 FOR IP): Performed by: EMERGENCY MEDICINE

## 2023-09-17 RX ORDER — METHYLPREDNISOLONE 4 MG/1
TABLET ORAL
Qty: 1 KIT | Refills: 0 | Status: SHIPPED | OUTPATIENT
Start: 2023-09-17

## 2023-09-17 RX ORDER — PREDNISONE 20 MG/1
60 TABLET ORAL ONCE
Status: COMPLETED | OUTPATIENT
Start: 2023-09-17 | End: 2023-09-17

## 2023-09-17 RX ORDER — METHYLPREDNISOLONE 4 MG/1
TABLET ORAL
Qty: 1 KIT | Refills: 0 | Status: SHIPPED | OUTPATIENT
Start: 2023-09-17 | End: 2023-09-17 | Stop reason: SDUPTHER

## 2023-09-17 RX ORDER — DIPHENHYDRAMINE HCL 25 MG
50 TABLET ORAL ONCE
Status: COMPLETED | OUTPATIENT
Start: 2023-09-17 | End: 2023-09-17

## 2023-09-17 RX ORDER — FAMOTIDINE 20 MG/1
20 TABLET, FILM COATED ORAL ONCE
Status: COMPLETED | OUTPATIENT
Start: 2023-09-17 | End: 2023-09-17

## 2023-09-17 RX ADMIN — DIPHENHYDRAMINE HYDROCHLORIDE 50 MG: 25 TABLET ORAL at 11:00

## 2023-09-17 RX ADMIN — PREDNISONE 60 MG: 20 TABLET ORAL at 11:00

## 2023-09-17 RX ADMIN — FAMOTIDINE 20 MG: 20 TABLET ORAL at 11:01

## 2023-09-17 ASSESSMENT — ENCOUNTER SYMPTOMS
SHORTNESS OF BREATH: 0
ABDOMINAL PAIN: 0
VOMITING: 0
BACK PAIN: 0
VOICE CHANGE: 0
DIARRHEA: 0
NAUSEA: 0
TROUBLE SWALLOWING: 0
FACIAL SWELLING: 1
COUGH: 0

## 2023-09-17 ASSESSMENT — PAIN - FUNCTIONAL ASSESSMENT: PAIN_FUNCTIONAL_ASSESSMENT: NONE - DENIES PAIN

## 2023-09-17 NOTE — ED PROVIDER NOTES
[facility] EMERGENCY DEPT  eMERGENCY dEPARTMENT eNCOUnter      Pt Name: Eleonora Low  MRN: [id number]  [address] [facility], [place] 1965  Date of evaluation: 9/17/2023  Provider: Brigido Pham MD    [address]       Chief Complaint   Patient presents with    Rash    Pruritis         HISTORY OF PRESENT ILLNESS   (Location/Symptom, Timing/Onset,Context/Setting, Quality, Duration, Modifying Factors, Severity)  Note limiting factors. Eleonora Low is a 62 y.o. female who presents to the emergency department for pruritic rash to her chest and face. She states initially 4 days ago noticed pruritic rash on her chest and then Friday 3 days ago began having some mild rash on her face but seem to be progressing today with more swelling which ultimately prompted her to come to the emergency department. She denies any oral swelling no difficulty breathing or stridor. No nausea vomiting diarrhea or abdominal pain. No chest pain or shortness of breath. No prior history of allergic reactions. No new soaps detergents medications foods etc.  Has not removed any ticks. HPI    NursingNotes were reviewed. REVIEW OF SYSTEMS    (2-9 systems for level 4, 10 or more for level 5)     Review of Systems   Constitutional:  Negative for chills and fever. HENT:  Positive for facial swelling. Negative for trouble swallowing and voice change. Respiratory:  Negative for cough and shortness of breath. Cardiovascular:  Negative for chest pain. Gastrointestinal:  Negative for abdominal pain, diarrhea, nausea and vomiting. Genitourinary:  Negative for dysuria and frequency. Musculoskeletal:  Negative for back pain and neck pain. Skin:  Positive for rash. Neurological:  Negative for dizziness and headaches. All other systems reviewed and are negative.            PAST MEDICALHISTORY     Past Medical History:   Diagnosis Date    Anxiety 6/17/2022    Chronic bilateral low back pain 10/16/2021    Class 2 severe obesity with serious

## 2023-09-17 NOTE — ED TRIAGE NOTES
Itching and rash which started several days ago but has worsened today.  Rash is on chest, face and back

## 2023-11-01 ENCOUNTER — OFFICE VISIT (OUTPATIENT)
Dept: PRIMARY CARE CLINIC | Age: 58
End: 2023-11-01
Payer: COMMERCIAL

## 2023-11-01 VITALS
HEART RATE: 69 BPM | HEIGHT: 68 IN | SYSTOLIC BLOOD PRESSURE: 130 MMHG | WEIGHT: 236.2 LBS | BODY MASS INDEX: 35.8 KG/M2 | DIASTOLIC BLOOD PRESSURE: 80 MMHG | OXYGEN SATURATION: 98 % | TEMPERATURE: 97.5 F

## 2023-11-01 DIAGNOSIS — I10 HYPERTENSION, ESSENTIAL, BENIGN: Primary | ICD-10-CM

## 2023-11-01 DIAGNOSIS — K21.9 GASTROESOPHAGEAL REFLUX DISEASE WITHOUT ESOPHAGITIS: ICD-10-CM

## 2023-11-01 DIAGNOSIS — Z13.220 SCREENING FOR HYPERLIPIDEMIA: ICD-10-CM

## 2023-11-01 DIAGNOSIS — Z12.31 ENCOUNTER FOR SCREENING MAMMOGRAM FOR MALIGNANT NEOPLASM OF BREAST: ICD-10-CM

## 2023-11-01 PROCEDURE — 3074F SYST BP LT 130 MM HG: CPT | Performed by: NURSE PRACTITIONER

## 2023-11-01 PROCEDURE — 99214 OFFICE O/P EST MOD 30 MIN: CPT | Performed by: NURSE PRACTITIONER

## 2023-11-01 PROCEDURE — 3078F DIAST BP <80 MM HG: CPT | Performed by: NURSE PRACTITIONER

## 2023-11-02 ASSESSMENT — ENCOUNTER SYMPTOMS
VOMITING: 0
COLOR CHANGE: 0
SHORTNESS OF BREATH: 0
DIARRHEA: 0
ABDOMINAL PAIN: 0
NAUSEA: 0
SORE THROAT: 0
CHEST TIGHTNESS: 0
COUGH: 0

## 2023-11-21 ENCOUNTER — OFFICE VISIT (OUTPATIENT)
Dept: OBGYN CLINIC | Age: 58
End: 2023-11-21
Payer: COMMERCIAL

## 2023-11-21 VITALS
HEART RATE: 84 BPM | SYSTOLIC BLOOD PRESSURE: 156 MMHG | BODY MASS INDEX: 35.73 KG/M2 | DIASTOLIC BLOOD PRESSURE: 74 MMHG | WEIGHT: 235 LBS

## 2023-11-21 DIAGNOSIS — Z01.419 WOMEN'S ANNUAL ROUTINE GYNECOLOGICAL EXAMINATION: Primary | ICD-10-CM

## 2023-11-21 DIAGNOSIS — Z12.4 SCREENING FOR CERVICAL CANCER: ICD-10-CM

## 2023-11-21 DIAGNOSIS — N95.2 VAGINAL ATROPHY: ICD-10-CM

## 2023-11-21 DIAGNOSIS — Z12.39 SCREENING BREAST EXAMINATION: ICD-10-CM

## 2023-11-21 LAB — HPV16+18+H RISK 12 DNA SPEC-IMP: NORMAL

## 2023-11-21 PROCEDURE — 3078F DIAST BP <80 MM HG: CPT | Performed by: NURSE PRACTITIONER

## 2023-11-21 PROCEDURE — 99396 PREV VISIT EST AGE 40-64: CPT | Performed by: NURSE PRACTITIONER

## 2023-11-21 PROCEDURE — 3077F SYST BP >= 140 MM HG: CPT | Performed by: NURSE PRACTITIONER

## 2023-11-21 ASSESSMENT — ENCOUNTER SYMPTOMS
CONSTIPATION: 0
EYES NEGATIVE: 1
DIARRHEA: 0
GASTROINTESTINAL NEGATIVE: 1
RESPIRATORY NEGATIVE: 1
ALLERGIC/IMMUNOLOGIC NEGATIVE: 1

## 2023-11-21 NOTE — PROGRESS NOTES
Pt presents today for pap smear and breast exam.       Mammo:has next month   Pap smear:  Contraception:Postmenopausal     P:2  Ab:0  Bone density:NA   Colonoscopy:2019

## 2023-11-21 NOTE — PROGRESS NOTES
Renny Riley is a 62 y.o. female who presents today for her medical conditions/ complaints as noted below. Renny Riley is c/o of Annual Exam        HPI  Pt presents for  well woman exam and pap smear. PCP ordered screening mammogram and pt has upcoming appt. Denies any problems. Has Premarin vaginal cream to use prn for dryness, but has not had to use lately. Mammo: 2022  Pap smear:  Contraception: menopausal    P:2  Ab:0  Bone density:  Colonoscopy:  Patient's last menstrual period was 2017.   [id number]    Past Medical History:   Diagnosis Date    Anxiety 2022    Chronic bilateral low back pain 10/16/2021    Class 2 severe obesity with serious comorbidity and body mass index (BMI) of 36.0 to 36.9 in adult ([address]) 10/16/2021    Gastroesophageal reflux disease 2021    Hypertension, essential, benign 2022    Seizures ([address])      Past Surgical History:   Procedure Laterality Date     SECTION      COLONOSCOPY N/A 1/15/2019    Dr Jenn Bernal, 5 yr recall     Family History   Problem Relation Age of Onset    High Blood Pressure Mother     Heart Disease Mother     High Blood Pressure Father     Heart Disease Father     Colon Cancer Father     Diabetes Maternal Grandmother      Social History     Tobacco Use    Smoking status: Former     Packs/day: 1.00     Years: 15.00     Additional pack years: 0.00     Total pack years: 15.00     Types: Cigarettes     Quit date: 1990     Years since quittin.9    Smokeless tobacco: Never   Substance Use Topics    Alcohol use: No       Current Outpatient Medications   Medication Sig Dispense Refill    triamterene-hydroCHLOROthiazide (MAXZIDE-25) 37.5-25 MG per tablet Take 1 tablet by mouth every morning 90 tablet 3    pantoprazole (PROTONIX) 40 MG tablet TAKE ONE TABLET BY MOUTH EVERY MORNING BEFORE BREAKFAST 90 tablet 3    carBAMazepine (CARBATROL) 100 MG extended release capsule TAKE TWO CAPSULES BY MOUTH TWO TIMES A

## 2023-11-22 RX ORDER — CARBAMAZEPINE 100 MG/1
200 CAPSULE, EXTENDED RELEASE ORAL 2 TIMES DAILY
Qty: 360 CAPSULE | Refills: 3 | Status: SHIPPED | OUTPATIENT
Start: 2023-11-22

## 2023-11-22 NOTE — TELEPHONE ENCOUNTER
Requested Prescriptions     Pending Prescriptions Disp Refills    carBAMazepine (CARBATROL) 100 MG extended release capsule [Pharmacy Med Name: CARBAMAZEPINE ER 100MG CP12] 360 capsule 3     Sig: TAKE TWO CAPSULES BY MOUTH TWICE A DAY       Last Office Visit: 1/24/2023  Next Office Visit: 1/29/2024  Last Medication Refill:  11/8/22 with 3 RF

## 2023-11-29 LAB
HPV HR 12 DNA SPEC QL NAA+PROBE: NOT DETECTED
HPV16 DNA SPEC QL NAA+PROBE: NOT DETECTED
HPV16+18+H RISK 12 DNA SPEC-IMP: NORMAL
HPV18 DNA SPEC QL NAA+PROBE: NOT DETECTED

## 2023-12-15 ENCOUNTER — HOSPITAL ENCOUNTER (OUTPATIENT)
Dept: WOMENS IMAGING | Age: 58
Discharge: HOME | End: 2023-12-15
Payer: COMMERCIAL

## 2023-12-15 DIAGNOSIS — Z12.31 ENCOUNTER FOR SCREENING MAMMOGRAM FOR MALIGNANT NEOPLASM OF BREAST: ICD-10-CM

## 2023-12-15 PROCEDURE — 77063 BREAST TOMOSYNTHESIS BI: CPT

## 2024-01-29 ENCOUNTER — OFFICE VISIT (OUTPATIENT)
Dept: PRIMARY CARE CLINIC | Age: 59
End: 2024-01-29
Payer: COMMERCIAL

## 2024-01-29 VITALS
RESPIRATION RATE: 97 BRPM | BODY MASS INDEX: 35.28 KG/M2 | TEMPERATURE: 98.6 F | HEIGHT: 68 IN | WEIGHT: 232.8 LBS | SYSTOLIC BLOOD PRESSURE: 130 MMHG | DIASTOLIC BLOOD PRESSURE: 80 MMHG | HEART RATE: 85 BPM

## 2024-01-29 DIAGNOSIS — R09.81 CONGESTED NOSE: ICD-10-CM

## 2024-01-29 DIAGNOSIS — J02.9 SORE THROAT: ICD-10-CM

## 2024-01-29 DIAGNOSIS — J10.1 INFLUENZA A: Primary | ICD-10-CM

## 2024-01-29 DIAGNOSIS — R05.9 COUGH, UNSPECIFIED TYPE: ICD-10-CM

## 2024-01-29 LAB
INFLUENZA A ANTIGEN, POC: POSITIVE
INFLUENZA B ANTIGEN, POC: NEGATIVE
LOT EXPIRE DATE: NORMAL
LOT KIT NUMBER: NORMAL
S PYO AG THROAT QL: NORMAL
SARS-COV-2, POC: NORMAL
VALID INTERNAL CONTROL: NORMAL
VENDOR AND KIT NAME POC: NORMAL

## 2024-01-29 PROCEDURE — 3075F SYST BP GE 130 - 139MM HG: CPT | Performed by: NURSE PRACTITIONER

## 2024-01-29 PROCEDURE — 99214 OFFICE O/P EST MOD 30 MIN: CPT | Performed by: NURSE PRACTITIONER

## 2024-01-29 PROCEDURE — 3079F DIAST BP 80-89 MM HG: CPT | Performed by: NURSE PRACTITIONER

## 2024-01-29 NOTE — PROGRESS NOTES
[address] [place] [address] Drive   [address] [place] [address], [facility]     Phone:  [phone]  Fax:  [phone]      Ree Allen is a 58 y.o. female who presents today for her medical conditions/complaints as noted below.  Ree Allen is c/o of Cough, Congestion, Fever, Generalized Body Aches, and Pharyngitis (Sore throat)      Chief Complaint   Patient presents with    Cough    Congestion    Fever    Generalized Body Aches    Pharyngitis     Sore throat       HPI:     HPI    Patient presents for cough, congestion, fever, sore throat and body aches.  Reports body aches started last Friday and they are better but congestion,cough,sore throat getting worse.     Past Medical History:   Diagnosis Date    Anxiety 2022    Chronic bilateral low back pain 10/16/2021    Class 2 severe obesity with serious comorbidity and body mass index (BMI) of 36.0 to 36.9 in adult ([facility]) 10/16/2021    Gastroesophageal reflux disease 2021    Hypertension, essential, benign 2022    Seizures ([facility])         Past Surgical History:   Procedure Laterality Date     SECTION      COLONOSCOPY N/A 1/15/2019    Dr ANABELA Garcia-karan, 5 yr recall       Social History     Tobacco Use    Smoking status: Former     Current packs/day: 0.00     Average packs/day: 1 pack/day for 15.0 years (15.0 ttl pk-yrs)     Types: Cigarettes     Start date: 1975     Quit date: 1990     Years since quittin.1    Smokeless tobacco: Never   Substance Use Topics    Alcohol use: No        Current Outpatient Medications   Medication Sig Dispense Refill    carBAMazepine (CARBATROL) 100 MG extended release capsule TAKE TWO CAPSULES BY MOUTH TWICE A  capsule 3    triamterene-hydroCHLOROthiazide (MAXZIDE-25) 37.5-25 MG per tablet Take 1 tablet by mouth every morning 90 tablet 3    pantoprazole (PROTONIX) 40 MG tablet TAKE ONE TABLET BY MOUTH EVERY MORNING BEFORE BREAKFAST 90 tablet 3    conjugated estrogens (PREMARIN) 0.625 MG/GM vaginal cream

## 2024-01-31 ASSESSMENT — ENCOUNTER SYMPTOMS
ABDOMINAL PAIN: 0
COUGH: 1
VOMITING: 0
SHORTNESS OF BREATH: 0
NAUSEA: 0
COLOR CHANGE: 0
SORE THROAT: 1
DIARRHEA: 0
CHEST TIGHTNESS: 0

## 2024-04-08 ENCOUNTER — NURSE TRIAGE (OUTPATIENT)
Dept: OTHER | Facility: CLINIC | Age: 59
End: 2024-04-08

## 2024-04-08 NOTE — TELEPHONE ENCOUNTER
Location of patient: [place]    Location of employment: [employer] where injury occurred: [employer]    Location of injury (body part involved): Right upper arm    Time of injury: 4/7/24 7pm    Last 4 of SSN: [id number]    Location associate referred to for care: [facility]. If unable to be seen today or tomorrow then employee is to go to [facility].    Subjective: Caller states sharp pain in right arm while repositioning a patient.     Current Symptoms: Dull pain in right upper arm. Denies pain in neck or shoulder area.    Pain Severity: 3/10; dull, aching, nagging; intermittent, waxing and waning    Temperature: Denies fever     What has been tried: Tylenol at bedtime last night    LMP: n/a Pregnant: NA    Recommended disposition: See in Office Today or Tomorrow      Reason for Disposition   Patient wants to be seen    Protocols used: Arm Injury-ADULT-[place]

## 2024-05-03 ENCOUNTER — OFFICE VISIT (OUTPATIENT)
Dept: PRIMARY CARE CLINIC | Age: 59
End: 2024-05-03
Payer: COMMERCIAL

## 2024-05-03 VITALS
OXYGEN SATURATION: 97 % | HEIGHT: 68 IN | TEMPERATURE: 97.8 F | BODY MASS INDEX: 37.01 KG/M2 | DIASTOLIC BLOOD PRESSURE: 80 MMHG | SYSTOLIC BLOOD PRESSURE: 138 MMHG | HEART RATE: 72 BPM | WEIGHT: 244.2 LBS

## 2024-05-03 DIAGNOSIS — E66.01 SEVERE OBESITY (BMI 35.0-39.9) WITH COMORBIDITY (HCC): ICD-10-CM

## 2024-05-03 DIAGNOSIS — K21.9 GASTROESOPHAGEAL REFLUX DISEASE WITHOUT ESOPHAGITIS: ICD-10-CM

## 2024-05-03 DIAGNOSIS — G40.909 SEIZURE DISORDER (HCC): ICD-10-CM

## 2024-05-03 DIAGNOSIS — I10 HYPERTENSION, ESSENTIAL, BENIGN: Primary | ICD-10-CM

## 2024-05-03 PROCEDURE — 99214 OFFICE O/P EST MOD 30 MIN: CPT | Performed by: NURSE PRACTITIONER

## 2024-05-03 PROCEDURE — 3079F DIAST BP 80-89 MM HG: CPT | Performed by: NURSE PRACTITIONER

## 2024-05-03 PROCEDURE — 3075F SYST BP GE 130 - 139MM HG: CPT | Performed by: NURSE PRACTITIONER

## 2024-05-03 RX ORDER — TRIAMTERENE AND HYDROCHLOROTHIAZIDE 37.5; 25 MG/1; MG/1
1 TABLET ORAL EVERY MORNING
Qty: 90 TABLET | Refills: 3 | Status: SHIPPED | OUTPATIENT
Start: 2024-05-03

## 2024-05-03 RX ORDER — PANTOPRAZOLE SODIUM 40 MG/1
TABLET, DELAYED RELEASE ORAL
Qty: 90 TABLET | Refills: 3 | Status: SHIPPED | OUTPATIENT
Start: 2024-05-03

## 2024-05-03 SDOH — ECONOMIC STABILITY: INCOME INSECURITY: HOW HARD IS IT FOR YOU TO PAY FOR THE VERY BASICS LIKE FOOD, HOUSING, MEDICAL CARE, AND HEATING?: NOT HARD AT ALL

## 2024-05-03 SDOH — ECONOMIC STABILITY: FOOD INSECURITY: WITHIN THE PAST 12 MONTHS, THE FOOD YOU BOUGHT JUST DIDN'T LAST AND YOU DIDN'T HAVE MONEY TO GET MORE.: NEVER TRUE

## 2024-05-03 SDOH — ECONOMIC STABILITY: FOOD INSECURITY: WITHIN THE PAST 12 MONTHS, YOU WORRIED THAT YOUR FOOD WOULD RUN OUT BEFORE YOU GOT MONEY TO BUY MORE.: NEVER TRUE

## 2024-05-03 ASSESSMENT — ENCOUNTER SYMPTOMS
COLOR CHANGE: 0
SHORTNESS OF BREATH: 0
SORE THROAT: 0
NAUSEA: 0
CHEST TIGHTNESS: 0
ABDOMINAL PAIN: 0
DIARRHEA: 0
COUGH: 0
VOMITING: 0

## 2024-05-03 NOTE — PROGRESS NOTES
[address] [place] [address] Drive   [address] [place] [address], [facility]     Phone:  [phone]  Fax:  [phone]      Ree Allen is a 58 y.o. female who presents today for her medical conditions/complaints as noted below.  Ree Allen is c/o of 6 Month Follow-Up, Hypertension, and Gastroesophageal Reflux      Chief Complaint   Patient presents with    6 Month Follow-Up    Hypertension    Gastroesophageal Reflux       HPI:     HPI     Patient presents for 6 month follow up for hypertension and GERD. Voices no concerns at this time. Has not had any seizures. Blood pressure is well controlled.     Past Medical History:   Diagnosis Date    Anxiety 2022    Chronic bilateral low back pain 10/16/2021    Class 2 severe obesity with serious comorbidity and body mass index (BMI) of 36.0 to 36.9 in adult (HCC) 10/16/2021    Gastroesophageal reflux disease 2021    Hypertension, essential, benign 2022    Seizures ([facility])         Past Surgical History:   Procedure Laterality Date     SECTION      COLONOSCOPY N/A 1/15/2019    Dr ANABELA Garcia-karan, 5 yr recall       Social History     Tobacco Use    Smoking status: Former     Current packs/day: 0.00     Average packs/day: 1 pack/day for 15.0 years (15.0 ttl pk-yrs)     Types: Cigarettes     Start date: 1975     Quit date: 1990     Years since quittin.3    Smokeless tobacco: Never   Substance Use Topics    Alcohol use: No        Current Outpatient Medications   Medication Sig Dispense Refill    pantoprazole (PROTONIX) 40 MG tablet TAKE ONE TABLET BY MOUTH EVERY MORNING BEFORE BREAKFAST 90 tablet 3    triamterene-hydroCHLOROthiazide (MAXZIDE-25) 37.5-25 MG per tablet Take 1 tablet by mouth every morning 90 tablet 3    carBAMazepine (CARBATROL) 100 MG extended release capsule TAKE TWO CAPSULES BY MOUTH TWICE A  capsule 3    conjugated estrogens (PREMARIN) 0.625 MG/GM vaginal cream Place 0.5 g vaginally Twice a Week (Patient taking differently:

## 2024-06-13 ENCOUNTER — OFFICE VISIT (OUTPATIENT)
Dept: NEUROLOGY | Age: 59
End: 2024-06-13
Payer: COMMERCIAL

## 2024-06-13 VITALS — DIASTOLIC BLOOD PRESSURE: 86 MMHG | HEART RATE: 76 BPM | SYSTOLIC BLOOD PRESSURE: 142 MMHG

## 2024-06-13 DIAGNOSIS — G40.909 SEIZURE DISORDER (HCC): Primary | ICD-10-CM

## 2024-06-13 PROCEDURE — 3077F SYST BP >= 140 MM HG: CPT | Performed by: PSYCHIATRY & NEUROLOGY

## 2024-06-13 PROCEDURE — 99213 OFFICE O/P EST LOW 20 MIN: CPT | Performed by: PSYCHIATRY & NEUROLOGY

## 2024-06-13 PROCEDURE — 3079F DIAST BP 80-89 MM HG: CPT | Performed by: PSYCHIATRY & NEUROLOGY

## 2024-06-13 NOTE — PROGRESS NOTES
Sensation is intact to light touch, temperature, and vibration in all extremities.  Coordination:  Rapid alternating movements are normal in both upper and lower extremities.  Finger to nose testing is unimpaired bilaterally.  Gait:  Normal station and gait.     Pertinent Diagnostic Studies:  Labs 11/2023    Assessment:       ICD-10-CM    1. Seizure disorder (HCC)  G40.909       Given the previous failure for tapering off anticonvulsants and her abnormal EEG years later, I do not believe trying to get her off the Tegretol would be successful and there would be a high risk for recurrent seizures.     Plan:   1.         Continue Carbatrol 200 mg BID  2.         Call with any seizure  3.         FU in a year    Electronically signed by Tad Spann MD on 6/13/24

## 2024-11-06 ENCOUNTER — OFFICE VISIT (OUTPATIENT)
Dept: PRIMARY CARE CLINIC | Age: 59
End: 2024-11-06
Payer: COMMERCIAL

## 2024-11-06 VITALS
HEIGHT: 68 IN | BODY MASS INDEX: 36.83 KG/M2 | OXYGEN SATURATION: 95 % | DIASTOLIC BLOOD PRESSURE: 79 MMHG | SYSTOLIC BLOOD PRESSURE: 138 MMHG | HEART RATE: 80 BPM | TEMPERATURE: 97.9 F | WEIGHT: 243 LBS

## 2024-11-06 DIAGNOSIS — Z12.11 SCREENING FOR COLORECTAL CANCER: ICD-10-CM

## 2024-11-06 DIAGNOSIS — I10 HYPERTENSION, ESSENTIAL, BENIGN: Primary | ICD-10-CM

## 2024-11-06 DIAGNOSIS — K21.9 GASTROESOPHAGEAL REFLUX DISEASE WITHOUT ESOPHAGITIS: ICD-10-CM

## 2024-11-06 DIAGNOSIS — Z12.12 SCREENING FOR COLORECTAL CANCER: ICD-10-CM

## 2024-11-06 DIAGNOSIS — Z12.31 ENCOUNTER FOR SCREENING MAMMOGRAM FOR MALIGNANT NEOPLASM OF BREAST: ICD-10-CM

## 2024-11-06 PROCEDURE — 99214 OFFICE O/P EST MOD 30 MIN: CPT | Performed by: NURSE PRACTITIONER

## 2024-11-06 PROCEDURE — 3075F SYST BP GE 130 - 139MM HG: CPT | Performed by: NURSE PRACTITIONER

## 2024-11-06 PROCEDURE — 3078F DIAST BP <80 MM HG: CPT | Performed by: NURSE PRACTITIONER

## 2024-11-06 ASSESSMENT — ENCOUNTER SYMPTOMS
SHORTNESS OF BREATH: 0
SORE THROAT: 0
VOMITING: 0
COUGH: 0
COLOR CHANGE: 0
DIARRHEA: 0
ABDOMINAL PAIN: 0
CHEST TIGHTNESS: 0
NAUSEA: 0

## 2024-11-06 NOTE — PROGRESS NOTES
[address] [place] [address] Drive   [address] [facility], [address]     Phone:  [phone]  Fax:  [phone]      Ree Allen is a 59 y.o. female who presents today for her medical conditions/complaints as noted below.  Ree Allen is c/o of Follow-up, Hypertension, and Gastroesophageal Reflux      Chief Complaint   Patient presents with    Follow-up    Hypertension    Gastroesophageal Reflux       HPI:     Patient presents in office for 3 month follow up on hypertension, and GERD.  Is doing well.  Blood pressure is 138/79 in office today.  Patient reports she has started new job and really enjoys it.  Takes Protonix as needed without any concerns.  Is agreeable to schedule her mammogram.  Is also agreeable for an EGD and colonoscopy screening.    Past Medical History:   Diagnosis Date    Anxiety 2022    Chronic bilateral low back pain 10/16/2021    Class 2 severe obesity with serious comorbidity and body mass index (BMI) of 36.0 to 36.9 in adult 10/16/2021    Gastroesophageal reflux disease 2021    Hypertension, essential, benign 2022    Seizures (HCC)         Past Surgical History:   Procedure Laterality Date     SECTION      COLONOSCOPY N/A 1/15/2019    Dr ANABELA Garcia-karan, 5 yr recall       Social History     Tobacco Use    Smoking status: Former     Current packs/day: 0.00     Average packs/day: 1 pack/day for 15.0 years (15.0 ttl pk-yrs)     Types: Cigarettes     Start date: 1975     Quit date: 1990     Years since quittin.8    Smokeless tobacco: Never   Substance Use Topics    Alcohol use: No        Current Outpatient Medications   Medication Sig Dispense Refill    pantoprazole (PROTONIX) 40 MG tablet TAKE ONE TABLET BY MOUTH EVERY MORNING BEFORE BREAKFAST 90 tablet 3    triamterene-hydroCHLOROthiazide (MAXZIDE-25) 37.5-25 MG per tablet Take 1 tablet by mouth every morning 90 tablet 3    carBAMazepine (CARBATROL) 100 MG extended release capsule TAKE TWO CAPSULES BY MOUTH

## 2024-11-13 ENCOUNTER — OFFICE VISIT (OUTPATIENT)
Dept: GASTROENTEROLOGY | Age: 59
End: 2024-11-13
Payer: COMMERCIAL

## 2024-11-13 VITALS
OXYGEN SATURATION: 98 % | SYSTOLIC BLOOD PRESSURE: 139 MMHG | BODY MASS INDEX: 36.98 KG/M2 | DIASTOLIC BLOOD PRESSURE: 70 MMHG | WEIGHT: 244 LBS | HEART RATE: 100 BPM | HEIGHT: 68 IN

## 2024-11-13 DIAGNOSIS — K21.9 GASTROESOPHAGEAL REFLUX DISEASE, UNSPECIFIED WHETHER ESOPHAGITIS PRESENT: Primary | ICD-10-CM

## 2024-11-13 DIAGNOSIS — Z12.11 COLON CANCER SCREENING: ICD-10-CM

## 2024-11-13 PROCEDURE — 99214 OFFICE O/P EST MOD 30 MIN: CPT

## 2024-11-13 PROCEDURE — 3075F SYST BP GE 130 - 139MM HG: CPT

## 2024-11-13 PROCEDURE — 3078F DIAST BP <80 MM HG: CPT

## 2024-11-13 ASSESSMENT — ENCOUNTER SYMPTOMS
BLOOD IN STOOL: 0
ABDOMINAL DISTENTION: 0
ABDOMINAL PAIN: 0
EYES NEGATIVE: 1
CONSTIPATION: 0
CHOKING: 0
DIARRHEA: 0
GASTROINTESTINAL NEGATIVE: 1
COUGH: 0
CHEST TIGHTNESS: 0
NAUSEA: 0
VOMITING: 0
VOICE CHANGE: 0
ALLERGIC/IMMUNOLOGIC NEGATIVE: 1
SHORTNESS OF BREATH: 0
ANAL BLEEDING: 0
TROUBLE SWALLOWING: 0
RECTAL PAIN: 0
RESPIRATORY NEGATIVE: 1

## 2024-11-13 NOTE — PROGRESS NOTES
shortness of breath.    Cardiovascular: Negative.  Negative for chest pain.   Gastrointestinal: Negative.  Negative for abdominal distention, abdominal pain, anal bleeding, blood in stool, constipation, diarrhea, nausea, rectal pain and vomiting.   Endocrine: Negative.    Genitourinary: Negative.    Musculoskeletal: Negative.    Skin: Negative.    Allergic/Immunologic: Negative.    Neurological: Negative.    Psychiatric/Behavioral: Negative.           Objective:     /70 (Site: Left Upper Arm)   Pulse 100   Ht 1.727 m (5' 8\")   Wt 110.7 kg (244 lb)   LMP 12/11/2017 Comment: HCG negative  SpO2 98%   BMI 37.10 kg/m²     Physical Exam  Vitals reviewed.   Constitutional:       Appearance: Normal appearance. She is obese.   HENT:      Head: Normocephalic and atraumatic.      Nose: Nose normal.      Mouth/Throat:      Mouth: Mucous membranes are moist.   Eyes:      Conjunctiva/sclera: Conjunctivae normal.   Cardiovascular:      Rate and Rhythm: Normal rate and regular rhythm.      Pulses: Normal pulses.      Heart sounds: Normal heart sounds. No murmur heard.     No gallop.   Pulmonary:      Effort: Pulmonary effort is normal. No respiratory distress.      Breath sounds: Normal breath sounds. No stridor. No wheezing or rhonchi.   Abdominal:      General: Bowel sounds are normal. There is no distension.      Palpations: Abdomen is soft.      Tenderness: There is no abdominal tenderness.   Musculoskeletal:         General: Normal range of motion.      Cervical back: Normal range of motion and neck supple.   Skin:     General: Skin is warm and dry.   Neurological:      General: No focal deficit present.      Mental Status: She is alert and oriented to person, place, and time.   Psychiatric:         Mood and Affect: Mood normal.         Behavior: Behavior normal.

## 2024-11-13 NOTE — PATIENT INSTRUCTIONS
belly pain.     Your stools are black and tarlike or have streaks of blood.     You vomit blood.   Watch closely for changes in your health, and be sure to contact your doctor if:    Your symptoms have not improved after 2 weeks.     Food seems to catch in your throat or chest.   Where can you learn more?  Go to https://www.[employer].net/patientEd and enter T927 to learn more about \"Gastroesophageal Reflux Disease (GERD): Care Instructions.\"  Current as of: October 19, 2023  Content Version: 14.2  © 2024 [employer].   Care instructions adapted under license by [employer]. If you have questions about a medical condition or this instruction, always ask your healthcare professional. [employer] disclaims any warranty or liability for your use of this information.

## 2024-11-22 ENCOUNTER — OFFICE VISIT (OUTPATIENT)
Dept: OBGYN CLINIC | Age: 59
End: 2024-11-22

## 2024-11-22 VITALS
HEART RATE: 86 BPM | WEIGHT: 240 LBS | SYSTOLIC BLOOD PRESSURE: 158 MMHG | DIASTOLIC BLOOD PRESSURE: 81 MMHG | BODY MASS INDEX: 36.49 KG/M2

## 2024-11-22 DIAGNOSIS — N95.2 VAGINAL ATROPHY: ICD-10-CM

## 2024-11-22 DIAGNOSIS — Z12.39 SCREENING BREAST EXAMINATION: ICD-10-CM

## 2024-11-22 DIAGNOSIS — Z01.419 WELL WOMAN EXAM WITH ROUTINE GYNECOLOGICAL EXAM: Primary | ICD-10-CM

## 2024-11-22 DIAGNOSIS — Z12.4 SCREENING FOR CERVICAL CANCER: ICD-10-CM

## 2024-11-22 LAB — HPV16+18+H RISK 12 DNA SPEC-IMP: NORMAL

## 2024-11-22 RX ORDER — CARBAMAZEPINE 200 MG/1
200 CAPSULE, EXTENDED RELEASE ORAL DAILY
COMMUNITY

## 2024-11-22 ASSESSMENT — ENCOUNTER SYMPTOMS
EYES NEGATIVE: 1
GASTROINTESTINAL NEGATIVE: 1
DIARRHEA: 0
ALLERGIC/IMMUNOLOGIC NEGATIVE: 1
CONSTIPATION: 0
RESPIRATORY NEGATIVE: 1

## 2024-11-22 NOTE — PROGRESS NOTES
Goal Outcome Evaluation:  Plan of Care Reviewed With: patient  Progress: improving  Outcome Summary: PT tx completed. Pt was instructed in ascending/descending stairs. Reinforced hip precautions. Plan for discharge today   Ree Allen is a 59 y.o. female who presents today for her medical conditions/ complaints as noted below. Ree Allen is c/o of Annual Exam        HPI  Pt presents for well woman exam and pap smear. Has upcoming appt for screening mammogram. Has prescription for vaginal estrogen, uses sparingly, no issues with vaginal dryness or pain.     Mammo:-Order already in chart   Pap smear:  Contraception:Postmenopausal     P:2  Ab:0  Bone density:NA  Colonoscopy:  Patient's last menstrual period was 2017.      Past Medical History:   Diagnosis Date    Anxiety 2022    Chronic bilateral low back pain 10/16/2021    Class 2 severe obesity with serious comorbidity and body mass index (BMI) of 36.0 to 36.9 in adult 10/16/2021    Gastroesophageal reflux disease 2021    Hypertension, essential, benign 2022    Seizures (HCC)      Past Surgical History:   Procedure Laterality Date     SECTION      COLONOSCOPY N/A 1/15/2019    Dr ANABELA Garcia-karan, 5 yr recall     Family History   Problem Relation Age of Onset    High Blood Pressure Mother     Heart Disease Mother     High Blood Pressure Father     Heart Disease Father     Colon Cancer Father 75    Diabetes Maternal Grandmother     Colon Polyps Neg Hx     Liver Cancer Neg Hx     Esophageal Cancer Neg Hx     Rectal Cancer Neg Hx     Stomach Cancer Neg Hx      Social History     Tobacco Use    Smoking status: Former     Current packs/day: 0.00     Average packs/day: 1 pack/day for 15.0 years (15.0 ttl pk-yrs)     Types: Cigarettes     Start date: 1975     Quit date: 1990     Years since quittin.9    Smokeless tobacco: Never   Substance Use Topics    Alcohol use: No       Current Outpatient Medications   Medication Sig Dispense Refill    carBAMazepine (CARBATROL) 200 MG extended release capsule Take 1 capsule by mouth daily Indications: 2 capsules daily      pantoprazole (PROTONIX) 40 MG tablet TAKE ONE TABLET BY MOUTH

## 2024-11-22 NOTE — PROGRESS NOTES
Pt presents today for pap smear and breast exam.      Mammo:-Order already in chart   Pap smear:  Contraception:Postmenopausal     P:2  Ab:0  Bone density:NA  Colonoscopy:2019

## 2024-12-03 RX ORDER — CARBAMAZEPINE 100 MG/1
TABLET, EXTENDED RELEASE ORAL
Qty: 360 TABLET | Refills: 3 | Status: SHIPPED | OUTPATIENT
Start: 2024-12-03

## 2024-12-03 NOTE — TELEPHONE ENCOUNTER
Ree Allen has requested a refill on her medication.      Last office visit : 2024   Next office visit : Visit date not found   Last medication refill :  Damon :       Requested Prescriptions     Pending Prescriptions Disp Refills    carBAMazepine (TEGRETOL-XR) 100 MG extended release tablet 360 tablet 3     Si po bid

## 2024-12-17 ENCOUNTER — HOSPITAL ENCOUNTER (OUTPATIENT)
Dept: WOMENS IMAGING | Age: 59
Discharge: HOME | End: 2024-12-17
Payer: COMMERCIAL

## 2024-12-17 DIAGNOSIS — Z12.31 ENCOUNTER FOR SCREENING MAMMOGRAM FOR MALIGNANT NEOPLASM OF BREAST: ICD-10-CM

## 2024-12-17 PROCEDURE — 77063 BREAST TOMOSYNTHESIS BI: CPT

## 2024-12-23 NOTE — PROGRESS NOTES
Pt called stating he did not get his paperwork done in time for his insurance, and he no longer has insurance.  Pt states he called CM at [facility], but they will not be back in the office until 1/2/25  Pt states he has a weeks worth of Biktarvy left and asked what he should do  I suggested pt come to the office an  2 weeks worth of samples, ordered by Dr Salmeron, to get him through to 1/2/25, then reach out to [facility] on 1/2/25  Pt agreed to come by office  Notified office staff pt will be coming to  2 bottles of Biktarvy     Pt presents today for pap smear and breast exam.      Mammo:2020 at 10:15  Pap smear:2019  Contraception:Perimenopausal     P:2  Ab:0  Bone density:NA  Colonoscopy:1/15/2019

## 2025-02-14 ENCOUNTER — ANESTHESIA EVENT (OUTPATIENT)
Dept: OPERATING ROOM | Age: 60
End: 2025-02-14

## 2025-02-17 ENCOUNTER — APPOINTMENT (OUTPATIENT)
Dept: OPERATING ROOM | Age: 60
End: 2025-02-17
Attending: INTERNAL MEDICINE

## 2025-02-17 ENCOUNTER — ANESTHESIA (OUTPATIENT)
Dept: OPERATING ROOM | Age: 60
End: 2025-02-17

## 2025-02-17 ENCOUNTER — TELEPHONE (OUTPATIENT)
Dept: GASTROENTEROLOGY | Age: 60
End: 2025-02-17

## 2025-02-17 ENCOUNTER — HOSPITAL ENCOUNTER (OUTPATIENT)
Age: 60
Setting detail: OUTPATIENT SURGERY
Discharge: HOME | End: 2025-02-17
Attending: INTERNAL MEDICINE | Admitting: INTERNAL MEDICINE

## 2025-02-17 ENCOUNTER — HOSPITAL ENCOUNTER (OUTPATIENT)
Age: 60
Setting detail: SPECIMEN
Discharge: HOME | End: 2025-02-17
Payer: COMMERCIAL

## 2025-02-17 VITALS
DIASTOLIC BLOOD PRESSURE: 63 MMHG | HEIGHT: 68 IN | HEART RATE: 74 BPM | OXYGEN SATURATION: 97 % | WEIGHT: 237 LBS | TEMPERATURE: 97.8 F | RESPIRATION RATE: 18 BRPM | BODY MASS INDEX: 35.92 KG/M2 | SYSTOLIC BLOOD PRESSURE: 124 MMHG

## 2025-02-17 DIAGNOSIS — K21.9 GASTROESOPHAGEAL REFLUX DISEASE WITHOUT ESOPHAGITIS: ICD-10-CM

## 2025-02-17 PROCEDURE — 88342 IMHCHEM/IMCYTCHM 1ST ANTB: CPT

## 2025-02-17 PROCEDURE — 43239 EGD BIOPSY SINGLE/MULTIPLE: CPT

## 2025-02-17 PROCEDURE — 88305 TISSUE EXAM BY PATHOLOGIST: CPT

## 2025-02-17 PROCEDURE — G8918 PT W/O PREOP ORDER IV AB PRO: HCPCS

## 2025-02-17 PROCEDURE — G0121 COLON CA SCRN NOT HI RSK IND: HCPCS

## 2025-02-17 PROCEDURE — G8907 PT DOC NO EVENTS ON DISCHARG: HCPCS

## 2025-02-17 RX ORDER — PANTOPRAZOLE SODIUM 40 MG/1
40 TABLET, DELAYED RELEASE ORAL
Qty: 90 TABLET | Refills: 3 | Status: SHIPPED | OUTPATIENT
Start: 2025-02-17 | End: 2025-02-17

## 2025-02-17 RX ORDER — SODIUM CHLORIDE, SODIUM LACTATE, POTASSIUM CHLORIDE, CALCIUM CHLORIDE 600; 310; 30; 20 MG/100ML; MG/100ML; MG/100ML; MG/100ML
INJECTION, SOLUTION INTRAVENOUS CONTINUOUS
Status: DISCONTINUED | OUTPATIENT
Start: 2025-02-17 | End: 2025-02-17 | Stop reason: HOSPADM

## 2025-02-17 RX ORDER — MIDAZOLAM HYDROCHLORIDE 1 MG/ML
INJECTION, SOLUTION INTRAMUSCULAR; INTRAVENOUS
Status: DISCONTINUED | OUTPATIENT
Start: 2025-02-17 | End: 2025-02-17 | Stop reason: SDUPTHER

## 2025-02-17 RX ORDER — LIDOCAINE HYDROCHLORIDE 10 MG/ML
INJECTION, SOLUTION INFILTRATION; PERINEURAL
Status: DISCONTINUED | OUTPATIENT
Start: 2025-02-17 | End: 2025-02-17 | Stop reason: SDUPTHER

## 2025-02-17 RX ORDER — PANTOPRAZOLE SODIUM 40 MG/1
40 TABLET, DELAYED RELEASE ORAL
Qty: 180 TABLET | Refills: 3 | Status: SHIPPED | OUTPATIENT
Start: 2025-02-17

## 2025-02-17 RX ORDER — ONDANSETRON 2 MG/ML
INJECTION INTRAMUSCULAR; INTRAVENOUS
Status: DISCONTINUED | OUTPATIENT
Start: 2025-02-17 | End: 2025-02-17 | Stop reason: SDUPTHER

## 2025-02-17 RX ORDER — FENTANYL CITRATE 50 UG/ML
INJECTION, SOLUTION INTRAMUSCULAR; INTRAVENOUS
Status: DISCONTINUED | OUTPATIENT
Start: 2025-02-17 | End: 2025-02-17 | Stop reason: SDUPTHER

## 2025-02-17 RX ORDER — PROPOFOL 10 MG/ML
INJECTION, EMULSION INTRAVENOUS
Status: DISCONTINUED | OUTPATIENT
Start: 2025-02-17 | End: 2025-02-17 | Stop reason: SDUPTHER

## 2025-02-17 RX ADMIN — FENTANYL CITRATE 100 MCG: 50 INJECTION, SOLUTION INTRAMUSCULAR; INTRAVENOUS at 10:47

## 2025-02-17 RX ADMIN — LIDOCAINE HYDROCHLORIDE 40 MG: 10 INJECTION, SOLUTION INFILTRATION; PERINEURAL at 10:50

## 2025-02-17 RX ADMIN — PROPOFOL 150 MG: 10 INJECTION, EMULSION INTRAVENOUS at 10:51

## 2025-02-17 RX ADMIN — SODIUM CHLORIDE, SODIUM LACTATE, POTASSIUM CHLORIDE, CALCIUM CHLORIDE: 600; 310; 30; 20 INJECTION, SOLUTION INTRAVENOUS at 09:58

## 2025-02-17 RX ADMIN — MIDAZOLAM HYDROCHLORIDE 2 MG: 1 INJECTION, SOLUTION INTRAMUSCULAR; INTRAVENOUS at 10:47

## 2025-02-17 RX ADMIN — ONDANSETRON 4 MG: 2 INJECTION INTRAMUSCULAR; INTRAVENOUS at 10:47

## 2025-02-17 ASSESSMENT — PAIN - FUNCTIONAL ASSESSMENT: PAIN_FUNCTIONAL_ASSESSMENT: NONE - DENIES PAIN

## 2025-02-17 NOTE — ANESTHESIA POSTPROCEDURE EVALUATION
Department of Anesthesiology  Postprocedure Note    Patient: Ree Allen  MRN: [id number]  YOB: 1965  Date of evaluation: 2/17/2025    Procedure Summary       Date: 02/17/25 Room / Location: [place] [address] / [facility]    Anesthesia Start: 1042 Anesthesia Stop:     Procedures:       ESOPHAGOGASTRODUODENOSCOPY BIOPSY (Esophagus)      COLORECTAL CANCER SCREENING, NOT HIGH RISK (Abdomen) Diagnosis:       Gastroesophageal reflux disease, unspecified whether esophagitis present      Screen for colon cancer      (Gastroesophageal reflux disease, unspecified whether esophagitis present [K21.9])      (Screen for colon cancer [Z12.11])    Surgeons: Conner Garcia MD Responsible Provider: Chaya Dupont APRN - CRNA    Anesthesia Type: general ASA Status: 2            Anesthesia Type: No value filed.    Becky Phase I:      Becky Phase II:      Anesthesia Post Evaluation    Patient location during evaluation: bedside  Patient participation: complete - patient participated  Level of consciousness: sleepy but conscious  Pain score: 0  Airway patency: patent  Nausea & Vomiting: no nausea and no vomiting  Cardiovascular status: hemodynamically stable and blood pressure returned to baseline  Respiratory status: acceptable and nasal cannula  Hydration status: stable  Pain management: adequate    No notable events documented.

## 2025-02-17 NOTE — H&P
Patient Name: Ree Allen  : 1965  MRN: [id number]  DATE: 25    Allergies:   Allergies   Allergen Reactions    Augmentin [Amoxicillin-Pot Clavulanate] Diarrhea     Severe diarrhea        ENDOSCOPY  History and Physical    Procedure:    [] Diagnostic Colonoscopy       [x] Screening Colonoscopy  [x] EGD      [] ERCP      [] EUS       [] Other    [x] Previous office notes/History and Physical reviewed from the patients chart. Please see EMR for further details of HPI. I have examined the patient's status immediately prior to the procedure and:      Indications/HPI:    []Abdominal Pain   []Barretts  [x]Screening/Surveillance   []History of Polyps  []Dysphagia            [] +Cologard/DNA testing  []Abnormal Imaging              []EOE Hx              [x] Family Hx of CRC/Polyps  []Anemia                            []Food Impaction       []Recent Poor Prep  []GI Bleed             []Lymphadenopathy  []History of Polyps  []Change in bowel habits [x]Heartburn/Reflux  []Cancer- GI/Lung  []Chest Pain - Non Cardiac []Heme (+) Stool []Ulcers  []Constipation  []Hemoptysis  []Incontinence    []Diarrhea  []Hypoxemia  []Rectal Bleed (BRBPR)  []Nausea/Vomiting   [] Varices  []Crohns/Colitis  []Pancreatic Cyst   [] Cirrhosis   []Pancreatitis    []Abnormal MRCP  []Elevated LFT [] Stent Removal, Previous ERCP  []Other:     Anesthesia:   [x] MAC [] Moderate Sedation   [] General   [] None     ROS: 12 pt Review of Symptoms was negative unless mentioned above    Medications:   Prior to Admission medications    Medication Sig Start Date End Date Taking? Authorizing Provider   carBAMazepine (CARBATROL) 200 MG extended release capsule Take 1 capsule by mouth daily Indications: 2 capsules daily   Yes Provider, MD Eleni   pantoprazole (PROTONIX) 40 MG tablet TAKE ONE TABLET BY MOUTH EVERY MORNING BEFORE BREAKFAST 5/3/24  Yes Sofia Benitez, EDELMIRA - CNP   triamterene-hydroCHLOROthiazide (MAXZIDE-25) 37.5-25 MG per tablet

## 2025-02-17 NOTE — DISCHARGE INSTRUCTIONS
EGD RECOMMENDATIONS:    1.  Await path results  2.  Increase Protonix to twice a day  3.  Complete NSAID avoidance  4.  Follow up OV with EDELMIRA Peña in 6-8 weeks.     Recommendations:  1. Repeat colonoscopy: 5 years, due to family hx of colon cancer.     POST-OP ORDERS: ENDOSCOPY & COLONOSCOPY:    1. Rest today.    2. DO NOT eat or drink until wide awake; eat your usual diet today in moderate amount only.    3. DO NOT drive today.    4. Call physician if you have severe pain, vomiting, fever, rectal bleeding or black bowel movements.    5.  If a biopsy was taken or a polyp removed, you should expect to hear results in about 21 days.  If you have heard nothing from your physician by then, call the office for results.    6.  Discharge home when patient awake, vitals signs stable and tolerating liquids.    7. Call with questions or concerns [phone].

## 2025-02-17 NOTE — TELEPHONE ENCOUNTER
02- [facility] called and stated that they are needing clarification on a prescription         Routed to KEN Hodge MA

## 2025-02-17 NOTE — ANESTHESIA PRE PROCEDURE
Department of Anesthesiology  Preprocedure Note       Name:  Ree Allen   Age:  59 y.o.  :  1965                                          MRN:  [id number]         Date:  2025      Surgeon: Surgeon(s):  Conner Garcia MD    Procedure: ESOPHAGOGASTRODUODENOSCOPY BIOPSY (Esophagus)  COLORECTAL CANCER SCREENING, NOT HIGH RISK (Abdomen)    Medications prior to admission:   Prior to Admission medications    Medication Sig Start Date End Date Taking? Authorizing Provider   carBAMazepine (CARBATROL) 200 MG extended release capsule Take 1 capsule by mouth daily Indications: 2 capsules daily   Yes Eleni Florentino MD   pantoprazole (PROTONIX) 40 MG tablet TAKE ONE TABLET BY MOUTH EVERY MORNING BEFORE BREAKFAST 5/3/24  Yes Sofia Benitez APRN - CNP   triamterene-hydroCHLOROthiazide (MAXZIDE-25) 37.5-25 MG per tablet Take 1 tablet by mouth every morning 5/3/24  Yes Sofia Benitez APRN - CNP   carBAMazepine (TEGRETOL-XR) 100 MG extended release tablet 2 po bid 12/3/24   Tad Spann MD   conjugated estrogens (PREMARIN) 0.625 MG/GM vaginal cream Place 0.5 g vaginally Twice a Week  Patient taking differently: Place 0.5 g vaginally Twice a Week As needed 11/15/21   Anya Trotter APRN - CNP   ascorbic acid (VITAMIN C) 500 MG tablet Take 1 tablet by mouth daily    Eleni Florentino MD   calcium carbonate (OSCAL) 500 MG TABS tablet Take 1 tablet by mouth daily    Eleni Florentino MD   therapeutic multivitamin-minerals (THERAGRAN-M) tablet Take 1 tablet by mouth daily    Eleni Florentino MD       Current medications:    Current Facility-Administered Medications   Medication Dose Route Frequency Provider Last Rate Last Admin    lactated ringers infusion   IntraVENous Continuous Conner Garcia MD           Allergies:    Allergies   Allergen Reactions    Augmentin [Amoxicillin-Pot Clavulanate] Diarrhea     Severe diarrhea       Problem List:    Patient Active Problem List   Diagnosis Code    Fibrocystic

## 2025-02-17 NOTE — OP NOTE
Patient: Ree Allen : 1965  Med Rec#: [id number] Acc#: [id number]   Primary Care Provider Sofia Benitez APRN - CNP    Date of Procedure:  2025    Endoscopist: Conner Garcia MD    Referring Provider: Sofia Benitez APRN - CNP, EDELMIRA Rose    Operation Performed: Colonoscopy     Indications: Screening- family hx of colon cancer (father)    Anesthesia:  Sedation was administered by anesthesia who monitored the patient during the procedure.    I met with Ree Allen prior to procedure. We discussed the procedure itself, and I have discussed the risks of endoscopy (including-- but not limited to-- pain, discomfort, bleeding potentially requiring second endoscopic procedure and/or blood transfusion, organ perforation requiring operative repair, damage to organs near the colon, infection, aspiration, cardiopulmonary/allergic reaction), benefits, indications to endoscopy. Additionally, we discussed options other than colonoscopy. The patient expressed understanding. All questions answered. The patient decided to proceed with the procedure.  Signed informed consent was placed on the chart.    Blood Loss: minimal    Withdrawal time: > 6 min  Bowel Prep: adequate     Complications: no immediate complications    DESCRIPTION OF PROCEDURE:     A time out was performed. After written informed consent was obtained, the patient was placed in the left lateral position.     The perianal area was inspected, and a digital rectal exam was performed. A rectal exam was performed: normal tone, no palpable lesions. At this point, a forward viewing Olympus colonoscope was inserted into the anus and carefully advanced to the terminal ileum.  The cecum was identified by the ileocecal valve and the appendiceal orifice. The colonoscope was then slowly withdrawn with careful inspection of the mucosa in a linear and circumferential fashion. The scope was retroflexed in the rectum. Suction was utilized during the

## 2025-02-17 NOTE — OP NOTE
Endoscopic Procedure Note    Patient: Ree Allen : 1965  Med Rec#: [id number] Acc#: [id number]     Primary Care Provider Sofia Benitez APRN - CNP  Referring Provider: EDELMIRA Myrick    Endoscopist: Conner Garcia MD    Date of Procedure:  2025    Procedure:   1. EGD with biopsy    Indications:   1. Reflux   2. Abdominal pain    Anesthesia:  Sedation was administered by anesthesia who monitored the patient during the procedure.    Estimated Blood Loss: minimal    Procedure:   After reviewing the patient's chart and obtaining informed consent, the patient was placed in the left lateral decubitus position.  A forward-viewing Olympus endoscope was lubricated and inserted through the mouth into the oropharynx. Under direct visualization, the upper esophagus was intubated. The scope was advanced to the level of the third portion of duodenum. Scope was slowly withdrawn with careful inspection of the mucosal surfaces. The scope was retroflexed for inspection of the gastric fundus and incisura. Findings and maneuvers are listed in impression below. The patient tolerated the procedure well. The scope was removed. There were no immediate complications.    Findings:   Esophagus: abnormal: there is mild to moderate esophagitis noted in the distal esophagus.     There is no significant hiatal hernia present.      Stomach:  Abnormal: Mild mucosal changes suggestive of gastritis noted -  Gastric biopsies were taken from the antrum and body to rule out Helicobacter pylori infection.      Duodenum: abnormal: there is patchy erythema and inflammation noted, consistent with mild to moderate duodenitis.       IMPRESSION:  1. Duodenitis   2. Gastritis- biopsied   3. Mild esophagitis     RECOMMENDATIONS:    1.  Await path results  2.  Increase Protonix to twice a day  3.  Complete NSAID avoidance  4.  Follow up OV with EDELMIRA Peña in 6-8 weeks.     The results were discussed with the patient and family.  WAQAR

## 2025-04-07 ENCOUNTER — OFFICE VISIT (OUTPATIENT)
Dept: GASTROENTEROLOGY | Age: 60
End: 2025-04-07
Payer: COMMERCIAL

## 2025-04-07 VITALS
BODY MASS INDEX: 37.13 KG/M2 | OXYGEN SATURATION: 96 % | HEART RATE: 88 BPM | HEIGHT: 68 IN | DIASTOLIC BLOOD PRESSURE: 80 MMHG | SYSTOLIC BLOOD PRESSURE: 125 MMHG | WEIGHT: 245 LBS

## 2025-04-07 DIAGNOSIS — K29.00 ACUTE SUPERFICIAL GASTRITIS WITHOUT HEMORRHAGE: Primary | ICD-10-CM

## 2025-04-07 DIAGNOSIS — K21.00 GASTROESOPHAGEAL REFLUX DISEASE WITH ESOPHAGITIS WITHOUT HEMORRHAGE: ICD-10-CM

## 2025-04-07 DIAGNOSIS — K57.90 DIVERTICULOSIS: ICD-10-CM

## 2025-04-07 DIAGNOSIS — K29.80 DUODENITIS: ICD-10-CM

## 2025-04-07 PROCEDURE — 3079F DIAST BP 80-89 MM HG: CPT

## 2025-04-07 PROCEDURE — 99213 OFFICE O/P EST LOW 20 MIN: CPT

## 2025-04-07 PROCEDURE — 3074F SYST BP LT 130 MM HG: CPT

## 2025-04-07 ASSESSMENT — ENCOUNTER SYMPTOMS
TROUBLE SWALLOWING: 0
CHOKING: 0
BLOOD IN STOOL: 0
ALLERGIC/IMMUNOLOGIC NEGATIVE: 1
EYES NEGATIVE: 1
CONSTIPATION: 0
DIARRHEA: 0
GASTROINTESTINAL NEGATIVE: 1
RESPIRATORY NEGATIVE: 1
ABDOMINAL PAIN: 0
ANAL BLEEDING: 0
NAUSEA: 0
VOMITING: 0

## 2025-04-07 NOTE — PROGRESS NOTES
Subjective:     Patient ID: Ree Allen is a 59 y.o. female  PCP: Sofia Benitez APRN - CNP  Referring Provider: No ref. provider found    HPI  Patient presents to the office today with the following complaints: Follow-up      Patient seen in the office today for follow up. She had an EGD and colonoscopy done on 2025. She is currently taking Protonix twice daily and her symptoms are better. I will decrease her Protonix to once daily at this time. We discussed pathology results and I will follow up with her in 1 year.         Last EGD -25-Dr ANABELA Garcia-Esophagitis, gastritis, duodenitis, no h pylori     Last Colonoscopy -25-Dr ANABELA Garcia-Diverticular disease, 5 yr recall     Family hx colon cancer: Father      Assessment:     1. Acute superficial gastritis without hemorrhage    2. Duodenitis    3. Gastroesophageal reflux disease with esophagitis without hemorrhage    4. Diverticulosis              Plan:     Follow up in 1 year or as needed  Protonix daily    Orders  No orders of the defined types were placed in this encounter.    Medications  No orders of the defined types were placed in this encounter.        Patient History:     Past Medical History:   Diagnosis Date    Anxiety 2022    Chronic bilateral low back pain 10/16/2021    Class 2 severe obesity with serious comorbidity and body mass index (BMI) of 36.0 to 36.9 in adult 10/16/2021    Gastroesophageal reflux disease 2021    Hypertension, essential, benign 2022    Seizures (HCC)        Past Surgical History:   Procedure Laterality Date     SECTION      COLONOSCOPY N/A 01/15/2019    Dr ANABELA Garcia-normal, 5 yr recall    COLONOSCOPY N/A 2025    Dr ANABELA Garcia-Diverticular disease, 5 yr recall    UPPER GASTROINTESTINAL ENDOSCOPY N/A 2025    Dr ANABELA Garcia-Esophagitis, gastritis, duodenitis, no h pylori       Family History   Problem Relation Age of Onset    High Blood Pressure Mother     Heart Disease Mother     High Blood

## 2025-05-05 SDOH — ECONOMIC STABILITY: TRANSPORTATION INSECURITY
IN THE PAST 12 MONTHS, HAS THE LACK OF TRANSPORTATION KEPT YOU FROM MEDICAL APPOINTMENTS OR FROM GETTING MEDICATIONS?: NO

## 2025-05-05 SDOH — ECONOMIC STABILITY: FOOD INSECURITY: WITHIN THE PAST 12 MONTHS, THE FOOD YOU BOUGHT JUST DIDN'T LAST AND YOU DIDN'T HAVE MONEY TO GET MORE.: NEVER TRUE

## 2025-05-05 SDOH — ECONOMIC STABILITY: FOOD INSECURITY: WITHIN THE PAST 12 MONTHS, YOU WORRIED THAT YOUR FOOD WOULD RUN OUT BEFORE YOU GOT MONEY TO BUY MORE.: NEVER TRUE

## 2025-05-05 SDOH — ECONOMIC STABILITY: INCOME INSECURITY: IN THE LAST 12 MONTHS, WAS THERE A TIME WHEN YOU WERE NOT ABLE TO PAY THE MORTGAGE OR RENT ON TIME?: NO

## 2025-05-05 SDOH — ECONOMIC STABILITY: TRANSPORTATION INSECURITY
IN THE PAST 12 MONTHS, HAS LACK OF TRANSPORTATION KEPT YOU FROM MEETINGS, WORK, OR FROM GETTING THINGS NEEDED FOR DAILY LIVING?: NO

## 2025-05-05 ASSESSMENT — PATIENT HEALTH QUESTIONNAIRE - PHQ9
1. LITTLE INTEREST OR PLEASURE IN DOING THINGS: NOT AT ALL
2. FEELING DOWN, DEPRESSED OR HOPELESS: NOT AT ALL
SUM OF ALL RESPONSES TO PHQ QUESTIONS 1-9: 0
SUM OF ALL RESPONSES TO PHQ QUESTIONS 1-9: 0
1. LITTLE INTEREST OR PLEASURE IN DOING THINGS: NOT AT ALL
SUM OF ALL RESPONSES TO PHQ QUESTIONS 1-9: 0
SUM OF ALL RESPONSES TO PHQ9 QUESTIONS 1 & 2: 0
2. FEELING DOWN, DEPRESSED OR HOPELESS: NOT AT ALL
SUM OF ALL RESPONSES TO PHQ QUESTIONS 1-9: 0

## 2025-05-07 ENCOUNTER — OFFICE VISIT (OUTPATIENT)
Dept: PRIMARY CARE CLINIC | Age: 60
End: 2025-05-07
Payer: COMMERCIAL

## 2025-05-07 VITALS
SYSTOLIC BLOOD PRESSURE: 130 MMHG | OXYGEN SATURATION: 97 % | BODY MASS INDEX: 37.44 KG/M2 | WEIGHT: 247 LBS | HEIGHT: 68 IN | HEART RATE: 93 BPM | DIASTOLIC BLOOD PRESSURE: 80 MMHG | TEMPERATURE: 97.8 F

## 2025-05-07 DIAGNOSIS — R23.3 PETECHIAE: ICD-10-CM

## 2025-05-07 DIAGNOSIS — L70.0 ACNE VULGARIS: ICD-10-CM

## 2025-05-07 DIAGNOSIS — I10 HYPERTENSION, ESSENTIAL, BENIGN: Primary | ICD-10-CM

## 2025-05-07 DIAGNOSIS — G25.81 RESTLESS LEGS: ICD-10-CM

## 2025-05-07 DIAGNOSIS — K21.9 GASTROESOPHAGEAL REFLUX DISEASE WITHOUT ESOPHAGITIS: ICD-10-CM

## 2025-05-07 DIAGNOSIS — Z13.220 SCREENING FOR HYPERLIPIDEMIA: ICD-10-CM

## 2025-05-07 DIAGNOSIS — I73.00 RAYNAUD'S DISEASE WITHOUT GANGRENE: ICD-10-CM

## 2025-05-07 PROCEDURE — 3075F SYST BP GE 130 - 139MM HG: CPT | Performed by: NURSE PRACTITIONER

## 2025-05-07 PROCEDURE — 3079F DIAST BP 80-89 MM HG: CPT | Performed by: NURSE PRACTITIONER

## 2025-05-07 PROCEDURE — 99214 OFFICE O/P EST MOD 30 MIN: CPT | Performed by: NURSE PRACTITIONER

## 2025-05-07 RX ORDER — TRIAMTERENE AND HYDROCHLOROTHIAZIDE 37.5; 25 MG/1; MG/1
1 TABLET ORAL EVERY MORNING
Qty: 90 TABLET | Refills: 3 | Status: SHIPPED | OUTPATIENT
Start: 2025-05-07

## 2025-05-07 ASSESSMENT — ENCOUNTER SYMPTOMS
SORE THROAT: 0
VOMITING: 0
CHEST TIGHTNESS: 0
ABDOMINAL PAIN: 0
COUGH: 0
SHORTNESS OF BREATH: 0
COLOR CHANGE: 0
NAUSEA: 0
DIARRHEA: 0

## 2025-05-07 NOTE — PROGRESS NOTES
[address] [place] [address] Drive   [address] [facility], [address]     Phone:  [phone]  Fax:  [phone]      Ree Allen is a 59 y.o. female who presents today for her medical conditions/complaints as noted below.  Ree Allen is c/o of 6 Month Follow-Up, Hypertension, Gastroesophageal Reflux, and Skin Problem      Chief Complaint   Patient presents with    6 Month Follow-Up    Hypertension    Gastroesophageal Reflux    Skin Problem       HPI:     History of Present Illness  The patient presents for evaluation of hypertension, GERD, petechiae, restless legs syndrome, and Raynaud's disease.    She reports the presence of small red spots on her face and fingers, which have been present for approximately 2 months. These spots are not associated with itching or bruising. She has been maintaining her usual skincare routine, which includes washing her face with soap and water twice daily, without any dryness. She has attempted to use Cetaphil and CeraVe, but these have not resulted in any improvement. She has not made any recent changes to her makeup products.    She also reports experiencing restless legs syndrome, characterized by a sensation of crawling in her legs when she is at rest. This symptom is not associated with muscle aches or cramps. She is not experiencing any shortness of breath.  The symptoms mostly occur at night.    Additionally, she notes that her hands become extremely cold from the knuckles down, turning white in color. This symptom is particularly noticeable during cold weather and necessitates warming her hands in hot water.    She had a colonoscopy and endoscopy recently, which were normal. Her Protonix dosage was increased due to irritation.    She has varicose veins on her legs. She is on Maxzide.  Blood pressure is well-controlled patient denies any other concerns.         Past Medical History:   Diagnosis Date    Anxiety 6/17/2022    Chronic bilateral low back pain 10/16/2021

## 2025-05-08 DIAGNOSIS — I10 HYPERTENSION, ESSENTIAL, BENIGN: ICD-10-CM

## 2025-05-08 DIAGNOSIS — Z13.220 SCREENING FOR HYPERLIPIDEMIA: ICD-10-CM

## 2025-05-08 LAB
ALBUMIN SERPL-MCNC: 4.1 G/DL (ref 3.5–5.2)
ALP SERPL-CCNC: 101 U/L (ref 35–104)
ALT SERPL-CCNC: 15 U/L (ref 10–35)
ANION GAP SERPL CALCULATED.3IONS-SCNC: 12 MMOL/L (ref 8–16)
AST SERPL-CCNC: 18 U/L (ref 10–35)
BASOPHILS # BLD: 0 K/UL (ref 0–0.2)
BASOPHILS NFR BLD: 0.5 % (ref 0–1)
BILIRUB SERPL-MCNC: 0.4 MG/DL (ref 0.2–1.2)
BUN SERPL-MCNC: 17 MG/DL (ref 6–20)
CALCIUM SERPL-MCNC: 9.8 MG/DL (ref 8.6–10)
CHLORIDE SERPL-SCNC: 103 MMOL/L (ref 98–107)
CHOLEST SERPL-MCNC: 153 MG/DL (ref 0–199)
CO2 SERPL-SCNC: 25 MMOL/L (ref 22–29)
CREAT SERPL-MCNC: 1.1 MG/DL (ref 0.5–0.9)
EOSINOPHIL # BLD: 0 K/UL (ref 0–0.6)
EOSINOPHIL NFR BLD: 0 % (ref 0–5)
ERYTHROCYTE [DISTWIDTH] IN BLOOD BY AUTOMATED COUNT: 12.8 % (ref 11.5–14.5)
GLUCOSE SERPL-MCNC: 99 MG/DL (ref 70–99)
HCT VFR BLD AUTO: 41 % (ref 37–47)
HDLC SERPL-MCNC: 54 MG/DL (ref 40–60)
HGB BLD-MCNC: 13.8 G/DL (ref 12–16)
IMM GRANULOCYTES # BLD: 0 K/UL
LDLC SERPL CALC-MCNC: 86 MG/DL
LYMPHOCYTES # BLD: 1.6 K/UL (ref 1.1–4.5)
LYMPHOCYTES NFR BLD: 25 % (ref 20–40)
MCH RBC QN AUTO: 29.9 PG (ref 27–31)
MCHC RBC AUTO-ENTMCNC: 33.7 G/DL (ref 33–37)
MCV RBC AUTO: 88.9 FL (ref 81–99)
MONOCYTES # BLD: 0.9 K/UL (ref 0–0.9)
MONOCYTES NFR BLD: 14.4 % (ref 0–10)
NEUTROPHILS # BLD: 3.9 K/UL (ref 1.5–7.5)
NEUTS SEG NFR BLD: 59.8 % (ref 50–65)
PLATELET # BLD AUTO: 243 K/UL (ref 130–400)
PMV BLD AUTO: 9.9 FL (ref 9.4–12.3)
POTASSIUM SERPL-SCNC: 4 MMOL/L (ref 3.5–5.1)
PROT SERPL-MCNC: 7.4 G/DL (ref 6.4–8.3)
RBC # BLD AUTO: 4.61 M/UL (ref 4.2–5.4)
SODIUM SERPL-SCNC: 140 MMOL/L (ref 136–145)
TRIGL SERPL-MCNC: 67 MG/DL (ref 0–149)
WBC # BLD AUTO: 6.6 K/UL (ref 4.8–10.8)

## 2025-05-09 ENCOUNTER — RESULTS FOLLOW-UP (OUTPATIENT)
Dept: PRIMARY CARE CLINIC | Age: 60
End: 2025-05-09

## 2025-06-27 ENCOUNTER — OFFICE VISIT (OUTPATIENT)
Dept: PRIMARY CARE CLINIC | Age: 60
End: 2025-06-27
Payer: COMMERCIAL

## 2025-06-27 VITALS
BODY MASS INDEX: 36.83 KG/M2 | SYSTOLIC BLOOD PRESSURE: 136 MMHG | HEIGHT: 68 IN | DIASTOLIC BLOOD PRESSURE: 78 MMHG | OXYGEN SATURATION: 96 % | HEART RATE: 90 BPM | WEIGHT: 243 LBS | TEMPERATURE: 98.6 F

## 2025-06-27 DIAGNOSIS — B02.7 DISSEMINATED HERPES ZOSTER: Primary | ICD-10-CM

## 2025-06-27 PROCEDURE — 3078F DIAST BP <80 MM HG: CPT | Performed by: NURSE PRACTITIONER

## 2025-06-27 PROCEDURE — 3075F SYST BP GE 130 - 139MM HG: CPT | Performed by: NURSE PRACTITIONER

## 2025-06-27 PROCEDURE — 99214 OFFICE O/P EST MOD 30 MIN: CPT | Performed by: NURSE PRACTITIONER

## 2025-06-27 RX ORDER — VALACYCLOVIR HYDROCHLORIDE 1 G/1
1000 TABLET, FILM COATED ORAL 3 TIMES DAILY
Qty: 21 TABLET | Refills: 0 | Status: SHIPPED | OUTPATIENT
Start: 2025-06-27 | End: 2025-07-04

## 2025-06-27 RX ORDER — TRIAMCINOLONE ACETONIDE 1 MG/G
OINTMENT TOPICAL 2 TIMES DAILY
Qty: 80 G | Refills: 1 | Status: SHIPPED | OUTPATIENT
Start: 2025-06-27 | End: 2025-07-04

## 2025-06-27 NOTE — PROGRESS NOTES
[address] [place] [address] Drive   [address] [place] [address], [facility]     Phone:  [phone]  Fax:  [phone]      Ree Allen is a 59 y.o. female who presents today for her medical conditions/complaints as noted below.  Ree Allen is c/o of Skin Problem (Skin painful spots on back, side, stomach for about a week )      Chief Complaint   Patient presents with    Skin Problem     Skin painful spots on back, side, stomach for about a week        HPI:     History of Present Illness  The patient presents for evaluation of shingles.    She reports a painful rash on the right side of her back that has been present for a few days. She has not scratched the rash but mentions a slight itch. She has been managing the condition with a 1% triamcinolone cream and the application of an ice pack.       Past Medical History:   Diagnosis Date    Anxiety 2022    Chronic bilateral low back pain 10/16/2021    Class 2 severe obesity with serious comorbidity and body mass index (BMI) of 36.0 to 36.9 in adult ([facility]) 10/16/2021    Gastroesophageal reflux disease 2021    Hypertension, essential, benign 2022    Seizures ([facility])         Past Surgical History:   Procedure Laterality Date     SECTION      COLONOSCOPY N/A 01/15/2019    Dr ANABELA Garcia-normal, 5 yr recall    COLONOSCOPY N/A 2025    Dr ANABELA Garcia-Diverticular disease, 5 yr recall    UPPER GASTROINTESTINAL ENDOSCOPY N/A 2025    Dr ANABELA Garcia-Esophagitis, gastritis, duodenitis, no h pylori       Social History     Tobacco Use    Smoking status: Former     Current packs/day: 0.00     Average packs/day: 1 pack/day for 15.0 years (15.0 ttl pk-yrs)     Types: Cigarettes     Start date: 1975     Quit date: 1990     Years since quittin.5    Smokeless tobacco: Never   Substance Use Topics    Alcohol use: No        Current Outpatient Medications   Medication Sig Dispense Refill    valACYclovir (VALTREX) 1 g tablet Take 1 tablet by mouth 3 times daily

## 2025-07-25 ENCOUNTER — OFFICE VISIT (OUTPATIENT)
Dept: PRIMARY CARE CLINIC | Age: 60
End: 2025-07-25
Payer: COMMERCIAL

## 2025-07-25 VITALS
SYSTOLIC BLOOD PRESSURE: 116 MMHG | DIASTOLIC BLOOD PRESSURE: 70 MMHG | TEMPERATURE: 98.1 F | WEIGHT: 243.6 LBS | HEART RATE: 94 BPM | BODY MASS INDEX: 36.92 KG/M2 | OXYGEN SATURATION: 96 % | HEIGHT: 68 IN

## 2025-07-25 DIAGNOSIS — B02.7 DISSEMINATED HERPES ZOSTER: Primary | ICD-10-CM

## 2025-07-25 DIAGNOSIS — L08.9 SKIN INFECTION: ICD-10-CM

## 2025-07-25 DIAGNOSIS — Z51.81 THERAPEUTIC DRUG MONITORING: ICD-10-CM

## 2025-07-25 LAB
ALCOHOL URINE: NORMAL
AMPHETAMINE SCREEN URINE: NORMAL
BARBITURATE SCREEN URINE: NORMAL
BENZODIAZEPINE SCREEN, URINE: NORMAL
BUPRENORPHINE URINE: NORMAL
COCAINE METABOLITE SCREEN URINE: NORMAL
FENTANYL SCREEN, URINE: NORMAL
GABAPENTIN SCREEN, URINE: NORMAL
MDMA, URINE: NORMAL
METHADONE SCREEN, URINE: NORMAL
METHAMPHETAMINE, URINE: NORMAL
OPIATE SCREEN URINE: NORMAL
OXYCODONE SCREEN URINE: NORMAL
PHENCYCLIDINE SCREEN URINE: NORMAL
PROPOXYPHENE SCREEN, URINE: NORMAL
SYNTHETIC CANNABINOIDS(K2) SCREEN, URINE: NORMAL
THC SCREEN, URINE: NORMAL
TRAMADOL SCREEN URINE: NORMAL
TRICYCLIC ANTIDEPRESSANTS, UR: NORMAL

## 2025-07-25 PROCEDURE — 3074F SYST BP LT 130 MM HG: CPT | Performed by: NURSE PRACTITIONER

## 2025-07-25 PROCEDURE — 99214 OFFICE O/P EST MOD 30 MIN: CPT | Performed by: NURSE PRACTITIONER

## 2025-07-25 PROCEDURE — 3078F DIAST BP <80 MM HG: CPT | Performed by: NURSE PRACTITIONER

## 2025-07-25 PROCEDURE — 80305 DRUG TEST PRSMV DIR OPT OBS: CPT | Performed by: NURSE PRACTITIONER

## 2025-07-25 RX ORDER — SULFAMETHOXAZOLE AND TRIMETHOPRIM 800; 160 MG/1; MG/1
1 TABLET ORAL 2 TIMES DAILY
Qty: 20 TABLET | Refills: 0 | Status: SHIPPED | OUTPATIENT
Start: 2025-07-25 | End: 2025-08-04

## 2025-07-25 RX ORDER — TRIAMCINOLONE ACETONIDE 1 MG/G
OINTMENT TOPICAL 2 TIMES DAILY
Qty: 80 G | Refills: 1 | Status: SHIPPED | OUTPATIENT
Start: 2025-07-25 | End: 2025-08-24

## 2025-07-25 NOTE — PROGRESS NOTES
POCT Rapid Drug Screen       Results for orders placed or performed in visit on 07/25/25   POCT Rapid Drug Screen   Result Value Ref Range    Alcohol, Urine      Amphetamine Screen, Ur None Detected None Detected    Barbiturate Screen, Ur neg     Benzodiazepine Screen, Urine neg     Buprenorphine Urine neg     Cocaine Metabolite Screen, Urine neg     FENTANYL SCREEN, URINE      Gabapentin Screen, Urine      MDMA, Urine neg     Methadone Screen, Urine neg     Methamphetamine, Urine neg     Opiate Screen, Urine neg     Oxycodone Screen, Ur neg     Phencyclidine (PCP), Screen, Urine neg     Propoxyphene Screen, Urine neg     Synthetic Cannabinoids (K2) Screen, Urine      THC Screen, Urine neg     Tramadol Scrn, Ur      Tricyclic Antidepressants, Urine neg         No follow-ups on file.    Orders Placed This Encounter   Procedures    POCT Rapid Drug Screen       Orders Placed This Encounter   Medications    triamcinolone (KENALOG) 0.1 % ointment     Sig: Apply topically 2 times daily To right side of abdomen     Dispense:  80 g     Refill:  1    sulfamethoxazole-trimethoprim (BACTRIM DS;SEPTRA DS) 800-160 MG per tablet     Sig: Take 1 tablet by mouth 2 times daily for 10 days     Dispense:  20 tablet     Refill:  0            Electronically signed by EDELMIRA Salter CNP on 7/31/2025 at 2:51 PM    Patient offered educational handouts and has had all questions answered.  Patient voices understanding and agrees to plans along with risks and benefits of plan. Patient is instructed to continue prior meds, diet, and exercise plans as instructed. Patient agrees to follow up as instructed and sooner if needed.  Patient agrees to go to ER if condition becomes emergent.      EMR Dragon/transcription disclaimer: Some of this encounter note is an electronic transcription/translation of spoken language to printed text. The electronic translation of spoken language may permit erroneous, or at times, nonsensical words or phrases to

## 2025-07-31 ASSESSMENT — ENCOUNTER SYMPTOMS
COLOR CHANGE: 0
CHEST TIGHTNESS: 0
DIARRHEA: 0
NAUSEA: 0
SORE THROAT: 0
ABDOMINAL PAIN: 0
COUGH: 0
VOMITING: 0
SHORTNESS OF BREATH: 0

## 2025-08-18 DIAGNOSIS — K21.9 GASTROESOPHAGEAL REFLUX DISEASE WITHOUT ESOPHAGITIS: ICD-10-CM

## 2025-08-18 RX ORDER — PANTOPRAZOLE SODIUM 40 MG/1
40 TABLET, DELAYED RELEASE ORAL
Qty: 180 TABLET | Refills: 3 | Status: SHIPPED | OUTPATIENT
Start: 2025-08-18 | End: 2025-08-19 | Stop reason: SDUPTHER

## 2025-08-19 ENCOUNTER — TELEPHONE (OUTPATIENT)
Dept: GASTROENTEROLOGY | Age: 60
End: 2025-08-19

## 2025-08-19 DIAGNOSIS — K21.9 GASTROESOPHAGEAL REFLUX DISEASE WITHOUT ESOPHAGITIS: ICD-10-CM

## 2025-08-19 RX ORDER — PANTOPRAZOLE SODIUM 40 MG/1
40 TABLET, DELAYED RELEASE ORAL
Qty: 180 TABLET | Refills: 3 | Status: SHIPPED | OUTPATIENT
Start: 2025-08-19

## (undated) DEVICE — BITE BLOCK ENDOSCP AD 60 FR W/ ADJ STRP PLAS GRN BLOX

## (undated) DEVICE — BRUSH ENDOSCP 2 END CHN HEDGEHOG

## (undated) DEVICE — SINGLE PORT MANIFOLD: Brand: NEPTUNE 2

## (undated) DEVICE — CANNULA NSL AD L7FT DIV O2 CO2 W/ M LUERLOCK TRMPT CONN

## (undated) DEVICE — FORCEPS BX 240CM 2.4MM L NDL RAD JAW 4 M00513334

## (undated) DEVICE — COLON KIT WITH 1.1 OZ ORCA HYDRA SEAL 2 GOWN

## (undated) DEVICE — ENDO KIT,LOURDES HOSPITAL: Brand: MEDLINE INDUSTRIES, INC.

## (undated) DEVICE — SUPPLEMENT DIGESTIVE H2O SOL GI-EASE

## (undated) DEVICE — ADAPTER CLEANING PORPOISE CLEANING

## (undated) DEVICE — CLEANING SPONGE: Brand: KOALA™